# Patient Record
Sex: FEMALE | Race: BLACK OR AFRICAN AMERICAN | NOT HISPANIC OR LATINO | URBAN - METROPOLITAN AREA
[De-identification: names, ages, dates, MRNs, and addresses within clinical notes are randomized per-mention and may not be internally consistent; named-entity substitution may affect disease eponyms.]

---

## 2018-05-15 ENCOUNTER — INPATIENT (INPATIENT)
Facility: HOSPITAL | Age: 83
LOS: 1 days | Discharge: HOME HEALTH | End: 2018-05-17
Attending: HOSPITALIST | Admitting: HOSPITALIST
Payer: MEDICARE

## 2018-05-15 VITALS
RESPIRATION RATE: 18 BRPM | DIASTOLIC BLOOD PRESSURE: 99 MMHG | TEMPERATURE: 99 F | SYSTOLIC BLOOD PRESSURE: 147 MMHG | OXYGEN SATURATION: 97 % | HEART RATE: 60 BPM

## 2018-05-15 DIAGNOSIS — N39.0 URINARY TRACT INFECTION, SITE NOT SPECIFIED: ICD-10-CM

## 2018-05-15 LAB
ALBUMIN SERPL ELPH-MCNC: 3.8 G/DL — SIGNIFICANT CHANGE UP (ref 3.3–5)
ALP SERPL-CCNC: 75 U/L — SIGNIFICANT CHANGE UP (ref 40–120)
ALT FLD-CCNC: 18 U/L — SIGNIFICANT CHANGE UP (ref 4–33)
APPEARANCE UR: SIGNIFICANT CHANGE UP
AST SERPL-CCNC: 45 U/L — HIGH (ref 4–32)
BASE EXCESS BLDV CALC-SCNC: 4.2 MMOL/L — SIGNIFICANT CHANGE UP
BASOPHILS # BLD AUTO: 0.04 K/UL — SIGNIFICANT CHANGE UP (ref 0–0.2)
BASOPHILS NFR BLD AUTO: 0.4 % — SIGNIFICANT CHANGE UP (ref 0–2)
BILIRUB SERPL-MCNC: 0.6 MG/DL — SIGNIFICANT CHANGE UP (ref 0.2–1.2)
BILIRUB UR-MCNC: NEGATIVE — SIGNIFICANT CHANGE UP
BLOOD GAS VENOUS - CREATININE: 1.25 MG/DL — SIGNIFICANT CHANGE UP (ref 0.5–1.3)
BLOOD UR QL VISUAL: HIGH
BUN SERPL-MCNC: 22 MG/DL — SIGNIFICANT CHANGE UP (ref 7–23)
BUN SERPL-MCNC: 24 MG/DL — HIGH (ref 7–23)
CALCIUM SERPL-MCNC: 9.4 MG/DL — SIGNIFICANT CHANGE UP (ref 8.4–10.5)
CALCIUM SERPL-MCNC: 9.8 MG/DL — SIGNIFICANT CHANGE UP (ref 8.4–10.5)
CHLORIDE BLDV-SCNC: 100 MMOL/L — SIGNIFICANT CHANGE UP (ref 96–108)
CHLORIDE SERPL-SCNC: 93 MMOL/L — LOW (ref 98–107)
CHLORIDE SERPL-SCNC: 95 MMOL/L — LOW (ref 98–107)
CO2 SERPL-SCNC: 25 MMOL/L — SIGNIFICANT CHANGE UP (ref 22–31)
CO2 SERPL-SCNC: 26 MMOL/L — SIGNIFICANT CHANGE UP (ref 22–31)
COLOR SPEC: YELLOW — SIGNIFICANT CHANGE UP
CREAT SERPL-MCNC: 1.31 MG/DL — HIGH (ref 0.5–1.3)
CREAT SERPL-MCNC: 1.49 MG/DL — HIGH (ref 0.5–1.3)
EOSINOPHIL # BLD AUTO: 0.07 K/UL — SIGNIFICANT CHANGE UP (ref 0–0.5)
EOSINOPHIL NFR BLD AUTO: 0.7 % — SIGNIFICANT CHANGE UP (ref 0–6)
GAS PNL BLDV: 129 MMOL/L — LOW (ref 136–146)
GLUCOSE BLDV-MCNC: 221 — HIGH (ref 70–99)
GLUCOSE SERPL-MCNC: 221 MG/DL — HIGH (ref 70–99)
GLUCOSE SERPL-MCNC: 225 MG/DL — HIGH (ref 70–99)
GLUCOSE UR-MCNC: NEGATIVE — SIGNIFICANT CHANGE UP
HCO3 BLDV-SCNC: 27 MMOL/L — SIGNIFICANT CHANGE UP (ref 20–27)
HCT VFR BLD CALC: 38.4 % — SIGNIFICANT CHANGE UP (ref 34.5–45)
HCT VFR BLDV CALC: 40.4 % — SIGNIFICANT CHANGE UP (ref 34.5–45)
HGB BLD-MCNC: 12 G/DL — SIGNIFICANT CHANGE UP (ref 11.5–15.5)
HGB BLDV-MCNC: 13.1 G/DL — SIGNIFICANT CHANGE UP (ref 11.5–15.5)
IMM GRANULOCYTES # BLD AUTO: 0.02 # — SIGNIFICANT CHANGE UP
IMM GRANULOCYTES NFR BLD AUTO: 0.2 % — SIGNIFICANT CHANGE UP (ref 0–1.5)
KETONES UR-MCNC: NEGATIVE — SIGNIFICANT CHANGE UP
LACTATE BLDV-MCNC: 1.4 MMOL/L — SIGNIFICANT CHANGE UP (ref 0.5–2)
LEUKOCYTE ESTERASE UR-ACNC: HIGH
LIDOCAIN IGE QN: 30.1 U/L — SIGNIFICANT CHANGE UP (ref 7–60)
LYMPHOCYTES # BLD AUTO: 2.74 K/UL — SIGNIFICANT CHANGE UP (ref 1–3.3)
LYMPHOCYTES # BLD AUTO: 28 % — SIGNIFICANT CHANGE UP (ref 13–44)
MCHC RBC-ENTMCNC: 25.3 PG — LOW (ref 27–34)
MCHC RBC-ENTMCNC: 31.3 % — LOW (ref 32–36)
MCV RBC AUTO: 80.8 FL — SIGNIFICANT CHANGE UP (ref 80–100)
MONOCYTES # BLD AUTO: 0.49 K/UL — SIGNIFICANT CHANGE UP (ref 0–0.9)
MONOCYTES NFR BLD AUTO: 5 % — SIGNIFICANT CHANGE UP (ref 2–14)
MUCOUS THREADS # UR AUTO: SIGNIFICANT CHANGE UP
NEUTROPHILS # BLD AUTO: 6.42 K/UL — SIGNIFICANT CHANGE UP (ref 1.8–7.4)
NEUTROPHILS NFR BLD AUTO: 65.7 % — SIGNIFICANT CHANGE UP (ref 43–77)
NITRITE UR-MCNC: NEGATIVE — SIGNIFICANT CHANGE UP
NRBC # FLD: 0 — SIGNIFICANT CHANGE UP
PCO2 BLDV: 48 MMHG — SIGNIFICANT CHANGE UP (ref 41–51)
PH BLDV: 7.39 PH — SIGNIFICANT CHANGE UP (ref 7.32–7.43)
PH UR: 6 — SIGNIFICANT CHANGE UP (ref 4.6–8)
PLATELET # BLD AUTO: 230 K/UL — SIGNIFICANT CHANGE UP (ref 150–400)
PMV BLD: 10.7 FL — SIGNIFICANT CHANGE UP (ref 7–13)
PO2 BLDV: 28 MMHG — LOW (ref 35–40)
POTASSIUM BLDV-SCNC: 5.3 MMOL/L — HIGH (ref 3.4–4.5)
POTASSIUM SERPL-MCNC: 4.2 MMOL/L — SIGNIFICANT CHANGE UP (ref 3.5–5.3)
POTASSIUM SERPL-MCNC: 5.4 MMOL/L — HIGH (ref 3.5–5.3)
POTASSIUM SERPL-SCNC: 4.2 MMOL/L — SIGNIFICANT CHANGE UP (ref 3.5–5.3)
POTASSIUM SERPL-SCNC: 5.4 MMOL/L — HIGH (ref 3.5–5.3)
PROT SERPL-MCNC: 7.1 G/DL — SIGNIFICANT CHANGE UP (ref 6–8.3)
PROT UR-MCNC: 300 MG/DL — HIGH
RBC # BLD: 4.75 M/UL — SIGNIFICANT CHANGE UP (ref 3.8–5.2)
RBC # FLD: 14.6 % — HIGH (ref 10.3–14.5)
RBC CASTS # UR COMP ASSIST: SIGNIFICANT CHANGE UP (ref 0–?)
SAO2 % BLDV: 52.7 % — LOW (ref 60–85)
SODIUM SERPL-SCNC: 132 MMOL/L — LOW (ref 135–145)
SODIUM SERPL-SCNC: 132 MMOL/L — LOW (ref 135–145)
SP GR SPEC: 1.02 — SIGNIFICANT CHANGE UP (ref 1–1.04)
SQUAMOUS # UR AUTO: SIGNIFICANT CHANGE UP
UROBILINOGEN FLD QL: NORMAL MG/DL — SIGNIFICANT CHANGE UP
WBC # BLD: 9.78 K/UL — SIGNIFICANT CHANGE UP (ref 3.8–10.5)
WBC # FLD AUTO: 9.78 K/UL — SIGNIFICANT CHANGE UP (ref 3.8–10.5)
WBC UR QL: >50 — HIGH (ref 0–?)

## 2018-05-15 PROCEDURE — 99223 1ST HOSP IP/OBS HIGH 75: CPT | Mod: GC

## 2018-05-15 PROCEDURE — 71045 X-RAY EXAM CHEST 1 VIEW: CPT | Mod: 26

## 2018-05-15 RX ORDER — CEFTRIAXONE 500 MG/1
1 INJECTION, POWDER, FOR SOLUTION INTRAMUSCULAR; INTRAVENOUS ONCE
Qty: 0 | Refills: 0 | Status: COMPLETED | OUTPATIENT
Start: 2018-05-15 | End: 2018-05-15

## 2018-05-15 RX ORDER — HEPARIN SODIUM 5000 [USP'U]/ML
5000 INJECTION INTRAVENOUS; SUBCUTANEOUS EVERY 8 HOURS
Qty: 0 | Refills: 0 | Status: DISCONTINUED | OUTPATIENT
Start: 2018-05-15 | End: 2018-05-17

## 2018-05-15 RX ORDER — ACETAMINOPHEN 500 MG
975 TABLET ORAL ONCE
Qty: 0 | Refills: 0 | Status: COMPLETED | OUTPATIENT
Start: 2018-05-15 | End: 2018-05-15

## 2018-05-15 RX ADMIN — CEFTRIAXONE 100 GRAM(S): 500 INJECTION, POWDER, FOR SOLUTION INTRAMUSCULAR; INTRAVENOUS at 23:51

## 2018-05-15 RX ADMIN — Medication 975 MILLIGRAM(S): at 20:06

## 2018-05-15 NOTE — ED ADULT NURSE NOTE - CHPI ED SYMPTOMS NEG
no vomiting/no numbness/no tingling/no weakness/no chills/no decreased eating/drinking/no nausea/no fever/no dizziness

## 2018-05-15 NOTE — ED ADULT TRIAGE NOTE - CHIEF COMPLAINT QUOTE
Pt brought in by EMS from home for AMS and as per ems family told them they thought her sugar was low,  by ems. Pt complaining of worsening chronic bilateral shoulder pain. pt denies chest pain.

## 2018-05-15 NOTE — H&P ADULT - NSHPREVIEWOFSYSTEMS_GEN_ALL_CORE
REVIEW OF SYSTEMS:    CONSTITUTIONAL: No weakness, fevers or chills  EYES/ENT: No visual changes;  No vertigo or throat pain   NECK: No pain or stiffness  RESPIRATORY: No cough, wheezing, hemoptysis; No shortness of breath  CARDIOVASCULAR: No chest pain or palpitations  GASTROINTESTINAL: Loose stools. No abdominal or epigastric pain. No nausea, vomiting, or hematemesis. No melena or hematochezia.  GENITOURINARY: No dysuria, frequency or hematuria  NEUROLOGICAL: Bilateral shoulder joint pain.   SKIN: No itching, burning, rashes, or lesions   All other review of systems is negative unless indicated above.

## 2018-05-15 NOTE — H&P ADULT - PROBLEM SELECTOR PLAN 6
Continue with home medication Valsartan with hold parameters. Will hold Valsartan in setting of HIREN.

## 2018-05-15 NOTE — H&P ADULT - PROBLEM SELECTOR PLAN 5
Patient seen in PMD's office for osteoarthritis which affects her shoulders, knees, and back. Patient states she has only used it once a day and it does not give her relief.   - continue with Tylenol PRN q6H for pain  - Given HIREN and history of bleeding, would avoid NSAIDs, avoid sedating pain medication given age

## 2018-05-15 NOTE — H&P ADULT - HISTORY OF PRESENT ILLNESS
89F PMH asthma, DM, HTN, hypothyroid, HLD p/w 3 days waxing and waning episodes of AMS, most recently this morning where pt became confused and was unable to check her own blood sugar (which she typically does multiple times/day). Denies recent fever/chills, NV. +2 episodes of foul smelling diarrhea over this time. No urinary complaints. No recent travel, hospitalizations or antibiotic use. Baseline alert and oriented x 3, ambulates with a cane. Pt also c/o months of b/l shoulder pain for which she saw her PMD and is taking Tylenol. No change in pain, but notes that it does bother her. No chest pain or SOB.    In the Er, patient's vitals were Temp 98.7, HR 68, /74, RR 18, satting 98 on room air.   In the ER, patient was given Tylenol, started on Ceftriaxone. 89F PMH asthma, DM2, HTN, hypothyroid, HLD p/w 3 days waxing and waning episodes of AMS, most recently this morning where pt became confused and was unable to check her own blood sugar (which she typically does multiple times/day). No recent fever/chills, no nausea or vomiting. Per daughter at bedside patient had 3 separate episode of loose foul smelling stools in the past three days, no episodes today. Patient denies any suprapubic pain, dysuria or increased frequency/urgency. No recent travel, hospitalizations or antibiotic use. Patient is currently at her baseline mental status, which is alert and oriented x 3. Pt's main complaint at this time is bilateral shoulder pain which she was told by her PMD to take tylenol, however it has not helped.     In the Er, patient's vitals were Temp 98.7, HR 68, /74, RR 18, satting 98 on room air.   In the ER, patient was given Tylenol, started on Ceftriaxone. 89F PMH asthma, afib (due to bleeding history), DM2, HTN, HLD p/w 3 days waxing and waning episodes of AMS, most recently this morning where pt became confused and was unable to check her own blood sugar (which she typically does multiple times/day). No recent fever/chills, no cough or URI symptoms, no nausea or vomiting. Per daughter at bedside patient had 3 separate episode of loose foul smelling stools in the past three days, no episodes today. Patient denies any suprapubic pain, dysuria or increased frequency/urgency. No recent travel, hospitalizations or antibiotic use. Patient is currently at her baseline mental status, which is alert and oriented x 3. Pt's main complaint at this time is bilateral shoulder pain which she was told by her PMD to take tylenol, however she has only been taking it once a day and states it has not helped.     In the Er, patient's vitals were Temp 98.7, HR 68, /74, RR 18, satting 98 on room air.   In the ER, patient was given Tylenol, started on Ceftriaxone. 89F PMH asthma, afib not on AC (due to bleeding history), DM2, HTN, HLD p/w 3 days waxing and waning episodes of AMS, most recently this morning where pt became confused and was unable to check her own blood sugar (which she typically does multiple times/day). No recent fever/chills, no cough or URI symptoms, no nausea or vomiting. Per daughter at bedside patient had 3 separate episodes of loose foul smelling stools in the past three days, no episodes today. Patient denies any suprapubic pain, dysuria or increased frequency/urgency. No recent travel, hospitalizations or antibiotic use. Patient is currently at her baseline mental status, which is alert and oriented x 3. Pt's main complaint at this time is bilateral shoulder pain which she was told by her PMD to take tylenol, however she has only been taking it once a day and states it has not helped.     In the Er, patient's vitals were Temp 98.7, HR 68, /74, RR 18, satting 98 on room air.   In the ER, patient was given Tylenol, started on Ceftriaxone.

## 2018-05-15 NOTE — H&P ADULT - PROBLEM SELECTOR PLAN 2
Currently now resolved, mental status at baseline AOx 3. Transient change in mental status most likely can be attributed to infection 2/2 UTI.   - Unlikely to have pneumonia as pt has no clinical symptoms   - Will treat UTI as outlined above  - Continue to monitor mental status, monitor for delirium in hospital setting.

## 2018-05-15 NOTE — ED ADULT NURSE NOTE - OBJECTIVE STATEMENT
Patient's family reports that patient has been having periods of increasing confusion. I.E. Patient was trying to use lancet as glucometer. Patient's FSBG WNL. Patient denies any fever/chills, dysuria, CP or SOB. Patient had one episode of diarrhea yesterday, denies diarrhea today. Patient is currently A&O x4, able to converse without difficulty.

## 2018-05-15 NOTE — H&P ADULT - PROBLEM SELECTOR PLAN 3
Likely pre-renal secondary to decreased PO intake  - Will give gentle hydration at 75cc/hour x 10 hours, repeat BMP in the morning   - Check urine lytes Likely pre-renal secondary to decreased PO intake  - Will give gentle hydration at 75cc/hour x 10 hours, repeat BMP in the morning   - Check urine lytes  - Patient takes Valsartan 160mg at home, will hold in the setting of HIREN.

## 2018-05-15 NOTE — H&P ADULT - PROBLEM SELECTOR PLAN 8
Not in acute exacerbation. Continue with symbicort and montelukast. Pt takes Glyburide at home. Glucose level on BMP in 200's  - Will start with low dose ISS, check FS qAC and qHS  - Check A1C in morning  - Carb consistent diet.

## 2018-05-15 NOTE — ED PROVIDER NOTE - OBJECTIVE STATEMENT
89F PMH asthma, DM, HTN, hypothyroid, HLD p/w 3 days waxing and waning episodes of AMS, most recently this morning where pt became confused and was unable to check her own blood sugar (which she typically does multiple times/day). Denies recent fever/chills, NV. +2 episodes of foul smelling diarrhea over this time. No urinary complaints. No recent travel, hospitalizations or antibiotic use. Baseline alert and oriented x 3, ambulates with a cane. Pt also c/o months of b/l shoulder pain for which she saw her PMD and is taking Tylenol. No change in pain, but notes that it does bother her. No chest pain or SOB.

## 2018-05-15 NOTE — H&P ADULT - ASSESSMENT
89F PMH asthma, afib (due to bleeding history), DM2, HTN, HLD p/w 3 days waxing and waning episodes of AMS, found to have urinary tract infection.

## 2018-05-15 NOTE — H&P ADULT - NSHPPHYSICALEXAM_GEN_ALL_CORE
PHYSICAL EXAM:  GENERAL: NAD, comfortable lying in bed   HEAD:  Atraumatic, Normocephalic  CHEST/LUNG: Trace crackles bilateral bases; No rales, rhonchi, wheezing, or rubs  HEART: Regular rate and rhythm; No murmurs, rubs, or gallops  ABDOMEN: Soft, Nontender, Nondistended; Bowel sounds present  VASCULAR:  2+ Peripheral Pulses, No clubbing, cyanosis, or edema  Extremities: No pain to palpation over the shoulder joint, limited range of motion  LYMPH: No lymphadenopathy noted  SKIN: No rashes or lesions  NERVOUS SYSTEM:  Alert & Oriented X3, Good concentration

## 2018-05-15 NOTE — ED PROVIDER NOTE - ATTENDING CONTRIBUTION TO CARE
I performed a face to face bedside interview with patient regarding history of present illness, review of symptoms and past medical history. I completed an independent physical exam.  I have discussed patient's plan of care.   I agree with note as stated above, having amended the EMR as needed to reflect my findings. I have discussed the assessment and plan of care.  This includes during the time I functioned as the attending physician for this patient.  Attending Contribution to Care: agree with plan of resident. pt p/w intermittent episodes of AMS, with worsening creatine. pt stable, with +UTI. lives at home alone, with risk of worsening mental status at home. requires admission.

## 2018-05-15 NOTE — ED PROVIDER NOTE - PROGRESS NOTE DETAILS
Accepted for admission to hospitalist, MAR text page sent. Plan d/w family who are comfortable with admission.

## 2018-05-15 NOTE — H&P ADULT - PROBLEM SELECTOR PLAN 4
Pt has had 3 separate episodes of foul smelling loose stools per family, however no episodes today. No recent change in diet, hospitalizations, or use of abx.   - Continue to monitor   - If watery, can send C. Diff

## 2018-05-15 NOTE — H&P ADULT - PROBLEM SELECTOR PLAN 1
Pt p/w altered mental status found to have positive UA, will treat as uncomplicated UTI.   - Continue with ceftriaxone  - Urine culture pending

## 2018-05-15 NOTE — ED PROVIDER NOTE - MEDICAL DECISION MAKING DETAILS
89F with transient episodes of confusion, b/l shoulder pain. Eval for infectious etiology, metabolic etiology. cbc, cmp, ua, ucx, cxr, analgesia and reassess.

## 2018-05-16 DIAGNOSIS — Z29.9 ENCOUNTER FOR PROPHYLACTIC MEASURES, UNSPECIFIED: ICD-10-CM

## 2018-05-16 DIAGNOSIS — R19.5 OTHER FECAL ABNORMALITIES: ICD-10-CM

## 2018-05-16 DIAGNOSIS — E78.00 PURE HYPERCHOLESTEROLEMIA, UNSPECIFIED: ICD-10-CM

## 2018-05-16 DIAGNOSIS — M25.511 PAIN IN RIGHT SHOULDER: ICD-10-CM

## 2018-05-16 DIAGNOSIS — N39.0 URINARY TRACT INFECTION, SITE NOT SPECIFIED: ICD-10-CM

## 2018-05-16 DIAGNOSIS — N17.9 ACUTE KIDNEY FAILURE, UNSPECIFIED: ICD-10-CM

## 2018-05-16 DIAGNOSIS — R41.82 ALTERED MENTAL STATUS, UNSPECIFIED: ICD-10-CM

## 2018-05-16 DIAGNOSIS — E11.9 TYPE 2 DIABETES MELLITUS WITHOUT COMPLICATIONS: ICD-10-CM

## 2018-05-16 DIAGNOSIS — I10 ESSENTIAL (PRIMARY) HYPERTENSION: ICD-10-CM

## 2018-05-16 DIAGNOSIS — J45.909 UNSPECIFIED ASTHMA, UNCOMPLICATED: ICD-10-CM

## 2018-05-16 LAB
ALBUMIN SERPL ELPH-MCNC: 3.5 G/DL — SIGNIFICANT CHANGE UP (ref 3.3–5)
ALP SERPL-CCNC: 71 U/L — SIGNIFICANT CHANGE UP (ref 40–120)
ALT FLD-CCNC: 10 U/L — SIGNIFICANT CHANGE UP (ref 4–33)
AST SERPL-CCNC: 16 U/L — SIGNIFICANT CHANGE UP (ref 4–32)
BASOPHILS # BLD AUTO: 0.05 K/UL — SIGNIFICANT CHANGE UP (ref 0–0.2)
BASOPHILS NFR BLD AUTO: 0.6 % — SIGNIFICANT CHANGE UP (ref 0–2)
BILIRUB SERPL-MCNC: 0.5 MG/DL — SIGNIFICANT CHANGE UP (ref 0.2–1.2)
BUN SERPL-MCNC: 24 MG/DL — HIGH (ref 7–23)
CALCIUM SERPL-MCNC: 9.5 MG/DL — SIGNIFICANT CHANGE UP (ref 8.4–10.5)
CHLORIDE SERPL-SCNC: 97 MMOL/L — LOW (ref 98–107)
CO2 SERPL-SCNC: 26 MMOL/L — SIGNIFICANT CHANGE UP (ref 22–31)
CREAT SERPL-MCNC: 1.44 MG/DL — HIGH (ref 0.5–1.3)
EOSINOPHIL # BLD AUTO: 0.13 K/UL — SIGNIFICANT CHANGE UP (ref 0–0.5)
EOSINOPHIL NFR BLD AUTO: 1.4 % — SIGNIFICANT CHANGE UP (ref 0–6)
GLUCOSE BLDC GLUCOMTR-MCNC: 197 MG/DL — HIGH (ref 70–99)
GLUCOSE BLDC GLUCOMTR-MCNC: 205 MG/DL — HIGH (ref 70–99)
GLUCOSE BLDC GLUCOMTR-MCNC: 223 MG/DL — HIGH (ref 70–99)
GLUCOSE SERPL-MCNC: 174 MG/DL — HIGH (ref 70–99)
HBA1C BLD-MCNC: 9.1 % — HIGH (ref 4–5.6)
HCT VFR BLD CALC: 42.2 % — SIGNIFICANT CHANGE UP (ref 34.5–45)
HGB BLD-MCNC: 13.3 G/DL — SIGNIFICANT CHANGE UP (ref 11.5–15.5)
IMM GRANULOCYTES # BLD AUTO: 0.01 # — SIGNIFICANT CHANGE UP
IMM GRANULOCYTES NFR BLD AUTO: 0.1 % — SIGNIFICANT CHANGE UP (ref 0–1.5)
LYMPHOCYTES # BLD AUTO: 3.32 K/UL — HIGH (ref 1–3.3)
LYMPHOCYTES # BLD AUTO: 36.5 % — SIGNIFICANT CHANGE UP (ref 13–44)
MAGNESIUM SERPL-MCNC: 2.1 MG/DL — SIGNIFICANT CHANGE UP (ref 1.6–2.6)
MCHC RBC-ENTMCNC: 25.1 PG — LOW (ref 27–34)
MCHC RBC-ENTMCNC: 31.5 % — LOW (ref 32–36)
MCV RBC AUTO: 79.8 FL — LOW (ref 80–100)
MONOCYTES # BLD AUTO: 0.7 K/UL — SIGNIFICANT CHANGE UP (ref 0–0.9)
MONOCYTES NFR BLD AUTO: 7.7 % — SIGNIFICANT CHANGE UP (ref 2–14)
NEUTROPHILS # BLD AUTO: 4.88 K/UL — SIGNIFICANT CHANGE UP (ref 1.8–7.4)
NEUTROPHILS NFR BLD AUTO: 53.7 % — SIGNIFICANT CHANGE UP (ref 43–77)
NRBC # FLD: 0 — SIGNIFICANT CHANGE UP
PHOSPHATE SERPL-MCNC: 3.4 MG/DL — SIGNIFICANT CHANGE UP (ref 2.5–4.5)
PLATELET # BLD AUTO: 228 K/UL — SIGNIFICANT CHANGE UP (ref 150–400)
PMV BLD: 11.2 FL — SIGNIFICANT CHANGE UP (ref 7–13)
POTASSIUM SERPL-MCNC: 4 MMOL/L — SIGNIFICANT CHANGE UP (ref 3.5–5.3)
POTASSIUM SERPL-SCNC: 4 MMOL/L — SIGNIFICANT CHANGE UP (ref 3.5–5.3)
PROT SERPL-MCNC: 6.3 G/DL — SIGNIFICANT CHANGE UP (ref 6–8.3)
RBC # BLD: 5.29 M/UL — HIGH (ref 3.8–5.2)
RBC # FLD: 14.6 % — HIGH (ref 10.3–14.5)
SODIUM SERPL-SCNC: 136 MMOL/L — SIGNIFICANT CHANGE UP (ref 135–145)
WBC # BLD: 9.09 K/UL — SIGNIFICANT CHANGE UP (ref 3.8–10.5)
WBC # FLD AUTO: 9.09 K/UL — SIGNIFICANT CHANGE UP (ref 3.8–10.5)

## 2018-05-16 PROCEDURE — 99233 SBSQ HOSP IP/OBS HIGH 50: CPT | Mod: GC

## 2018-05-16 RX ORDER — INSULIN LISPRO 100/ML
VIAL (ML) SUBCUTANEOUS
Qty: 0 | Refills: 0 | Status: DISCONTINUED | OUTPATIENT
Start: 2018-05-16 | End: 2018-05-17

## 2018-05-16 RX ORDER — SODIUM CHLORIDE 9 MG/ML
1000 INJECTION, SOLUTION INTRAVENOUS
Qty: 0 | Refills: 0 | Status: DISCONTINUED | OUTPATIENT
Start: 2018-05-16 | End: 2018-05-17

## 2018-05-16 RX ORDER — BUDESONIDE AND FORMOTEROL FUMARATE DIHYDRATE 160; 4.5 UG/1; UG/1
2 AEROSOL RESPIRATORY (INHALATION)
Qty: 0 | Refills: 0 | Status: DISCONTINUED | OUTPATIENT
Start: 2018-05-16 | End: 2018-05-17

## 2018-05-16 RX ORDER — DEXTROSE 50 % IN WATER 50 %
12.5 SYRINGE (ML) INTRAVENOUS ONCE
Qty: 0 | Refills: 0 | Status: DISCONTINUED | OUTPATIENT
Start: 2018-05-16 | End: 2018-05-17

## 2018-05-16 RX ORDER — INSULIN LISPRO 100/ML
VIAL (ML) SUBCUTANEOUS AT BEDTIME
Qty: 0 | Refills: 0 | Status: DISCONTINUED | OUTPATIENT
Start: 2018-05-16 | End: 2018-05-17

## 2018-05-16 RX ORDER — ACETAMINOPHEN 500 MG
650 TABLET ORAL EVERY 6 HOURS
Qty: 0 | Refills: 0 | Status: DISCONTINUED | OUTPATIENT
Start: 2018-05-16 | End: 2018-05-17

## 2018-05-16 RX ORDER — MONTELUKAST 4 MG/1
10 TABLET, CHEWABLE ORAL DAILY
Qty: 0 | Refills: 0 | Status: DISCONTINUED | OUTPATIENT
Start: 2018-05-16 | End: 2018-05-17

## 2018-05-16 RX ORDER — ATORVASTATIN CALCIUM 80 MG/1
10 TABLET, FILM COATED ORAL AT BEDTIME
Qty: 0 | Refills: 0 | Status: DISCONTINUED | OUTPATIENT
Start: 2018-05-16 | End: 2018-05-17

## 2018-05-16 RX ORDER — GLUCAGON INJECTION, SOLUTION 0.5 MG/.1ML
1 INJECTION, SOLUTION SUBCUTANEOUS ONCE
Qty: 0 | Refills: 0 | Status: DISCONTINUED | OUTPATIENT
Start: 2018-05-16 | End: 2018-05-17

## 2018-05-16 RX ORDER — CEFTRIAXONE 500 MG/1
1 INJECTION, POWDER, FOR SOLUTION INTRAMUSCULAR; INTRAVENOUS EVERY 24 HOURS
Qty: 0 | Refills: 0 | Status: DISCONTINUED | OUTPATIENT
Start: 2018-05-16 | End: 2018-05-17

## 2018-05-16 RX ORDER — SODIUM CHLORIDE 9 MG/ML
1000 INJECTION INTRAMUSCULAR; INTRAVENOUS; SUBCUTANEOUS
Qty: 0 | Refills: 0 | Status: DISCONTINUED | OUTPATIENT
Start: 2018-05-16 | End: 2018-05-17

## 2018-05-16 RX ORDER — DEXTROSE 50 % IN WATER 50 %
15 SYRINGE (ML) INTRAVENOUS ONCE
Qty: 0 | Refills: 0 | Status: DISCONTINUED | OUTPATIENT
Start: 2018-05-16 | End: 2018-05-17

## 2018-05-16 RX ORDER — DEXTROSE 50 % IN WATER 50 %
25 SYRINGE (ML) INTRAVENOUS ONCE
Qty: 0 | Refills: 0 | Status: DISCONTINUED | OUTPATIENT
Start: 2018-05-16 | End: 2018-05-17

## 2018-05-16 RX ORDER — VALSARTAN 80 MG/1
160 TABLET ORAL DAILY
Qty: 0 | Refills: 0 | Status: DISCONTINUED | OUTPATIENT
Start: 2018-05-16 | End: 2018-05-16

## 2018-05-16 RX ORDER — AMLODIPINE BESYLATE 2.5 MG/1
2.5 TABLET ORAL DAILY
Qty: 0 | Refills: 0 | Status: DISCONTINUED | OUTPATIENT
Start: 2018-05-16 | End: 2018-05-17

## 2018-05-16 RX ADMIN — Medication 650 MILLIGRAM(S): at 22:46

## 2018-05-16 RX ADMIN — Medication 650 MILLIGRAM(S): at 09:50

## 2018-05-16 RX ADMIN — Medication 650 MILLIGRAM(S): at 03:45

## 2018-05-16 RX ADMIN — ATORVASTATIN CALCIUM 10 MILLIGRAM(S): 80 TABLET, FILM COATED ORAL at 22:43

## 2018-05-16 RX ADMIN — BUDESONIDE AND FORMOTEROL FUMARATE DIHYDRATE 2 PUFF(S): 160; 4.5 AEROSOL RESPIRATORY (INHALATION) at 22:41

## 2018-05-16 RX ADMIN — Medication 650 MILLIGRAM(S): at 02:34

## 2018-05-16 RX ADMIN — HEPARIN SODIUM 5000 UNIT(S): 5000 INJECTION INTRAVENOUS; SUBCUTANEOUS at 13:13

## 2018-05-16 RX ADMIN — Medication 650 MILLIGRAM(S): at 10:26

## 2018-05-16 RX ADMIN — Medication 1: at 09:01

## 2018-05-16 RX ADMIN — SODIUM CHLORIDE 75 MILLILITER(S): 9 INJECTION INTRAMUSCULAR; INTRAVENOUS; SUBCUTANEOUS at 02:04

## 2018-05-16 RX ADMIN — SODIUM CHLORIDE 75 MILLILITER(S): 9 INJECTION INTRAMUSCULAR; INTRAVENOUS; SUBCUTANEOUS at 09:00

## 2018-05-16 RX ADMIN — Medication 650 MILLIGRAM(S): at 16:41

## 2018-05-16 RX ADMIN — Medication 650 MILLIGRAM(S): at 02:04

## 2018-05-16 RX ADMIN — SODIUM CHLORIDE 75 MILLILITER(S): 9 INJECTION INTRAMUSCULAR; INTRAVENOUS; SUBCUTANEOUS at 03:44

## 2018-05-16 RX ADMIN — Medication 1: at 18:41

## 2018-05-16 RX ADMIN — MONTELUKAST 10 MILLIGRAM(S): 4 TABLET, CHEWABLE ORAL at 13:13

## 2018-05-16 RX ADMIN — HEPARIN SODIUM 5000 UNIT(S): 5000 INJECTION INTRAVENOUS; SUBCUTANEOUS at 06:43

## 2018-05-16 RX ADMIN — Medication 2: at 13:13

## 2018-05-16 RX ADMIN — AMLODIPINE BESYLATE 2.5 MILLIGRAM(S): 2.5 TABLET ORAL at 18:38

## 2018-05-16 RX ADMIN — HEPARIN SODIUM 5000 UNIT(S): 5000 INJECTION INTRAVENOUS; SUBCUTANEOUS at 22:43

## 2018-05-16 RX ADMIN — BUDESONIDE AND FORMOTEROL FUMARATE DIHYDRATE 2 PUFF(S): 160; 4.5 AEROSOL RESPIRATORY (INHALATION) at 09:00

## 2018-05-16 RX ADMIN — Medication 650 MILLIGRAM(S): at 17:30

## 2018-05-16 NOTE — PROGRESS NOTE ADULT - PROBLEM SELECTOR PLAN 4
- Pt has had 3 separate episodes of foul smelling loose stools per family, however no episodes today  - Continue to monitor bowel movements  - If watery or very foul smelling, can send C. Diff if suspicion

## 2018-05-16 NOTE — PROGRESS NOTE ADULT - PROBLEM SELECTOR PLAN 8
- Pt takes Glyburide at home. Glucose level on BMP in 200's  - Will start with low dose ISS, check FS qAC and qHS  - Check A1C in morning

## 2018-05-16 NOTE — CONSULT NOTE ADULT - SUBJECTIVE AND OBJECTIVE BOX
HISTORY OF PRESENT ILLNESS: 89 year old with HTN HLD DM h/o PAF not on AC secondary to history of bleeding , hypothyroidism with historically normal LV function on TTE admitted with AMS found to have UTI. The patient has no anginal symptoms.       PAST MEDICAL & SURGICAL HISTORY:  Atrial fibrillation  Hypothyroid  Diabetes  Hypercholesteremia  Asthma  Hypertension  No significant past surgical history    	    MEDICATIONS:  heparin  Injectable 5000 Unit(s) SubCutaneous every 8 hours  cefTRIAXone   IVPB 1 Gram(s) IV Intermittent every 24 hours  buDESOnide 160 MICROgram(s)/formoterol 4.5 MICROgram(s) Inhaler 2 Puff(s) Inhalation two times a day  montelukast 10 milliGRAM(s) Oral daily  acetaminophen   Tablet. 650 milliGRAM(s) Oral every 6 hours PRN  atorvastatin 10 milliGRAM(s) Oral at bedtime  glucagon  Injectable 1 milliGRAM(s) IntraMuscular once PRN  insulin lispro (HumaLOG) corrective regimen sliding scale   SubCutaneous three times a day before meals  insulin lispro (HumaLOG) corrective regimen sliding scale   SubCutaneous at bedtime  dextrose 5%. 1000 milliLiter(s) IV Continuous <Continuous>  sodium chloride 0.9%. 1000 milliLiter(s) IV Continuous <Continuous>      Allergies  No Known Allergies      FAMILY HISTORY:  No pertinent family history in first degree relatives      SOCIAL HISTORY:    [+ ] Non-smoker  [ ] Smoker  [- ] Alcohol      REVIEW OF SYSTEMS:  altered mental status  otherwise negative     PHYSICAL EXAM:  T(C): 37.1 (05-16-18 @ 06:23), Max: 37.7 (05-16-18 @ 01:06)  HR: 72 (05-16-18 @ 06:23) (60 - 72)  BP: 141/76 (05-16-18 @ 06:23) (141/76 - 177/74)  RR: 18 (05-16-18 @ 06:23) (18 - 18)  SpO2: 99% (05-16-18 @ 06:23) (97% - 100%)  Wt(kg): --  I&O's Summary      Appearance: Normal	  HEENT:   Normal oral mucosa, PERRL, EOMI	  Lymphatic: No lymphadenopathy  Cardiovascular: Normal S1 S2, No JVD, No murmurs, No edema  Respiratory: Lungs clear to auscultation	  Gastrointestinal:  Soft, Non-tender, + BS	  Skin: No rashes, No ecchymoses, No cyanosis	  Neurologic: Non-focal;   Extremities: Normal range of motion, No clubbing, cyanosis or edema  Vascular: Peripheral pulses palpable 2+ bilaterally    TELEMETRY: n/a  	    ECG:  	    RADIOLOGY:    Xray Chest 1 View AP/PA (05.15.18 @ 20:20) >    IMPRESSION:     Patchy bilateral lower lobe opacities may represent atelectasis and/or   pneumonia. Small left pleural effusion.        	  LABS:	 	                          13.3   9.09  )-----------( 228      ( 16 May 2018 11:05 )             42.2       05-16    136  |  97<L>  |  24<H>  ----------------------------<  174<H>  4.0   |  26  |  1.44<H>    Ca    9.5      16 May 2018 11:05  Phos  3.4     05-16  Mg     2.1     05-16    TPro  6.3  /  Alb  3.5  /  TBili  0.5  /  DBili  x   /  AST  16  /  ALT  10  /  AlkPhos  71  05-16      HgA1c: Hemoglobin A1C, Whole Blood: 9.1 % (05-16 @ 11:05)      ASSESSMENT/PLAN: 	89 year old with HTN HLD DM h/o PAF not on AC secondary to history of bleeding , hypothyroidism with historically normal LV function on TTE admitted with AMS found to have UTI. The patient has no anginal symptoms.     -- HISTORY OF PRESENT ILLNESS: 89 year old with HTN HLD DM h/o PAF not on AC secondary to history of bleeding , hypothyroidism with historically normal LV function on TTE admitted with AMS found to have UTI. The patient has no anginal symptoms.       PAST MEDICAL & SURGICAL HISTORY:  Atrial fibrillation  Hypothyroid  Diabetes  Hypercholesteremia  Asthma  Hypertension  No significant past surgical history    	    MEDICATIONS:  heparin  Injectable 5000 Unit(s) SubCutaneous every 8 hours  cefTRIAXone   IVPB 1 Gram(s) IV Intermittent every 24 hours  buDESOnide 160 MICROgram(s)/formoterol 4.5 MICROgram(s) Inhaler 2 Puff(s) Inhalation two times a day  montelukast 10 milliGRAM(s) Oral daily  acetaminophen   Tablet. 650 milliGRAM(s) Oral every 6 hours PRN  atorvastatin 10 milliGRAM(s) Oral at bedtime  glucagon  Injectable 1 milliGRAM(s) IntraMuscular once PRN  insulin lispro (HumaLOG) corrective regimen sliding scale   SubCutaneous three times a day before meals  insulin lispro (HumaLOG) corrective regimen sliding scale   SubCutaneous at bedtime  dextrose 5%. 1000 milliLiter(s) IV Continuous <Continuous>  sodium chloride 0.9%. 1000 milliLiter(s) IV Continuous <Continuous>      Allergies  No Known Allergies      FAMILY HISTORY:  No pertinent family history in first degree relatives      SOCIAL HISTORY:    [+ ] Non-smoker  [ ] Smoker  [- ] Alcohol      REVIEW OF SYSTEMS:  altered mental status  otherwise negative     PHYSICAL EXAM:  T(C): 37.1 (05-16-18 @ 06:23), Max: 37.7 (05-16-18 @ 01:06)  HR: 72 (05-16-18 @ 06:23) (60 - 72)  BP: 141/76 (05-16-18 @ 06:23) (141/76 - 177/74)  RR: 18 (05-16-18 @ 06:23) (18 - 18)  SpO2: 99% (05-16-18 @ 06:23) (97% - 100%)  Wt(kg): --  I&O's Summary      Appearance: Normal	  HEENT:   Normal oral mucosa, PERRL, EOMI	  Lymphatic: No lymphadenopathy  Cardiovascular: Normal S1 S2, No JVD, No murmurs, No edema  Respiratory: Lungs clear to auscultation	  Gastrointestinal:  Soft, Non-tender, + BS	  Skin: No rashes, No ecchymoses, No cyanosis	  Neurologic: Non-focal;   Extremities: , No clubbing, cyanosis or edema  Vascular: Peripheral pulses palpable 2+ bilaterally    TELEMETRY: n/a  	    ECG:  	nsr narrow qrs no acute ischemia    RADIOLOGY:    Xray Chest 1 View AP/PA (05.15.18 @ 20:20) >    IMPRESSION:     Patchy bilateral lower lobe opacities may represent atelectasis and/or   pneumonia. Small left pleural effusion.        	  LABS:	 	                          13.3   9.09  )-----------( 228      ( 16 May 2018 11:05 )             42.2       05-16    136  |  97<L>  |  24<H>  ----------------------------<  174<H>  4.0   |  26  |  1.44<H>    Ca    9.5      16 May 2018 11:05  Phos  3.4     05-16  Mg     2.1     05-16    TPro  6.3  /  Alb  3.5  /  TBili  0.5  /  DBili  x   /  AST  16  /  ALT  10  /  AlkPhos  71  05-16      HgA1c: Hemoglobin A1C, Whole Blood: 9.1 % (05-16 @ 11:05)      ASSESSMENT/PLAN: 	89 year old with HTN HLD DM h/o PAF not on AC secondary to history of bleeding , hypothyroidism with historically normal LV function on TTE admitted with AMS found to have UTI. The patient has no anginal symptoms.     -- ekg with no acute ischemia ; NSR  -- mental status improving   -- IV abx for UTI per primary team   -- h/o PAF - no AC secondary to advanced age and bleeding history  -- will arrange outpt f/u with Dr Clark upon dc for cardiology   -- at this time no need for inpt cardiac testing   -- HD stable no evidence CHF    Fanny Chung RPA-C

## 2018-05-16 NOTE — CONSULT NOTE ADULT - ATTENDING COMMENTS
Patient seen and examined.  Agree with above.   -off ac secondary to bleeding risk  -continue with rate control strategy for afib    Daniel Yang MD

## 2018-05-16 NOTE — PROGRESS NOTE ADULT - PROBLEM SELECTOR PLAN 1
- patient with positive UA and admitted with AMS- may have AMS 2/2 to UTI  - Continue with ceftriaxone for uncomplicated UTI (likely 3 days of abx)  - Urine culture pending

## 2018-05-16 NOTE — PROGRESS NOTE ADULT - ASSESSMENT
89F PMH asthma, afib (due to bleeding history), DM2, HTN, HLD p/w 3 days waxing and waning episodes of AMS, found to have urinary tract infection, now back to baseline.

## 2018-05-16 NOTE — PROGRESS NOTE ADULT - PROBLEM SELECTOR PLAN 2
- this morning- patient AxOx3- appropriately answers most questions  - overnight, per daughter, patient back to baseline   - Will treat UTI as outlined above  - Continue to monitor mental status, monitor for delirium in hospital setting

## 2018-05-16 NOTE — PROGRESS NOTE ADULT - PROBLEM SELECTOR PLAN 3
Likely pre-renal secondary to decreased PO intake  - Will give gentle hydration at 75cc/hour x 10 hours, repeat BMP in the morning   - Check urine lytes  - Patient takes Valsartan 160mg at home, will hold in the setting of HIREN. - Likely pre-renal secondary to decreased PO intake and diarrhea  - s/p fluid resuscitation- Cr improved  - Check urine lytes  - Patient takes Valsartan 160mg at home, will hold in the setting of HIREN  - per son (HCP) patient recently taken of HCTZ by outpatient physician

## 2018-05-16 NOTE — PROGRESS NOTE ADULT - PROBLEM SELECTOR PLAN 6
-Will hold Valsartan in setting of HIREN  - patient on felodipine at home -Will hold Valsartan in setting of HIREN  - patient on felodipine at home, c/w amlodipine here

## 2018-05-16 NOTE — PROGRESS NOTE ADULT - SUBJECTIVE AND OBJECTIVE BOX
Kelly Tiffani PGY 1  Pager: [phone]/ [phone]    Patient is a 89y old  Female who presents with a chief complaint of Altered mental status (16 May 2018 03:58)      SUBJECTIVE / OVERNIGHT EVENTS:  Patient seen and examined at bedside.    Other Review of Systems Negative.    MEDICATIONS  (STANDING):  atorvastatin 10 milliGRAM(s) Oral at bedtime  buDESOnide 160 MICROgram(s)/formoterol 4.5 MICROgram(s) Inhaler 2 Puff(s) Inhalation two times a day  cefTRIAXone   IVPB 1 Gram(s) IV Intermittent every 24 hours  dextrose 5%. 1000 milliLiter(s) (50 mL/Hr) IV Continuous <Continuous>  dextrose 50% Injectable 12.5 Gram(s) IV Push once  dextrose 50% Injectable 25 Gram(s) IV Push once  dextrose 50% Injectable 25 Gram(s) IV Push once  heparin  Injectable 5000 Unit(s) SubCutaneous every 8 hours  insulin lispro (HumaLOG) corrective regimen sliding scale   SubCutaneous three times a day before meals  insulin lispro (HumaLOG) corrective regimen sliding scale   SubCutaneous at bedtime  montelukast 10 milliGRAM(s) Oral daily  sodium chloride 0.9%. 1000 milliLiter(s) (75 mL/Hr) IV Continuous <Continuous>    MEDICATIONS  (PRN):  acetaminophen   Tablet. 650 milliGRAM(s) Oral every 6 hours PRN Mild Pain (1 - 3)  dextrose 40% Gel 15 Gram(s) Oral once PRN Blood Glucose LESS THAN 70 milliGRAM(s)/deciliter  glucagon  Injectable 1 milliGRAM(s) IntraMuscular once PRN Glucose LESS THAN 70 milligrams/deciliter      OBJECTIVE:    Vital Signs Last 24 Hrs  T(C): 37.1 (16 May 2018 06:23), Max: 37.7 (16 May 2018 01:06)  T(F): 98.7 (16 May 2018 06:23), Max: 99.9 (16 May 2018 01:06)  HR: 72 (16 May 2018 06:23) (60 - 72)  BP: 141/76 (16 May 2018 06:23) (141/76 - 177/74)  BP(mean): --  RR: 18 (16 May 2018 06:23) (18 - 18)  SpO2: 99% (16 May 2018 06:23) (97% - 100%)    CAPILLARY BLOOD GLUCOSE        I&O's Summary      PHYSICAL EXAM:  GENERAL: NAD, well-developed  HEAD:  Atraumatic, Normocephalic  EYES: EOMI, PERRLA, conjunctiva and sclera clear  NECK: Supple, No JVD  CHEST/LUNG: Clear to auscultation bilaterally; No wheeze  HEART: Regular rate and rhythm; No murmurs, rubs, or gallops  ABDOMEN: Soft, Nontender, Nondistended; Bowel sounds present  EXTREMITIES:  2+ Peripheral Pulses, No clubbing, cyanosis, or edema  PSYCH: AAOx3  NEUROLOGY: non-focal  SKIN: No rashes or lesions    LABS:                        12.0   9.78  )-----------( 230      ( 15 May 2018 19:17 )             38.4     Auto Eosinophil # 0.07  / Auto Eosinophil % 0.7   / Auto Neutrophil # 6.42  / Auto Neutrophil % 65.7  / BANDS % x        05-15    132<L>  |  95<L>  |  24<H>  ----------------------------<  221<H>  4.2   |  26  |  1.49<H>  05-15    132<L>  |  93<L>  |  22  ----------------------------<  225<H>  5.4<H>   |  25  |  1.31<H>    Ca    9.4      15 May 2018 22:13  TPro  7.1  /  Alb  3.8  /  TBili  0.6  /  DBili  x   /  AST  45<H>  /  ALT  18  /  AlkPhos  75  05-15          Urinalysis Basic - ( 15 May 2018 22:00 )    Color: YELLOW / Appearance: HAZY / S.017 / pH: 6.0  Gluc: NEGATIVE / Ketone: NEGATIVE  / Bili: NEGATIVE / Urobili: NORMAL mg/dL   Blood: TRACE / Protein: 300 mg/dL / Nitrite: NEGATIVE   Leuk Esterase: LARGE / RBC: 2-5 / WBC >50   Sq Epi: OCC / Non Sq Epi: x / Bacteria: x Kelly Nixon PGY 1  Pager: [phone]/ [phone]    Patient is a 89y old  Female who presents with a chief complaint of Altered mental status (16 May 2018 03:58)      SUBJECTIVE / OVERNIGHT EVENTS:  Patient seen and examined at bedside. Patient is complaining of pain in the left shoulder and her knees, otherwise has no complaints.    Other Review of Systems Negative.    MEDICATIONS  (STANDING):  atorvastatin 10 milliGRAM(s) Oral at bedtime  buDESOnide 160 MICROgram(s)/formoterol 4.5 MICROgram(s) Inhaler 2 Puff(s) Inhalation two times a day  cefTRIAXone   IVPB 1 Gram(s) IV Intermittent every 24 hours  dextrose 5%. 1000 milliLiter(s) (50 mL/Hr) IV Continuous <Continuous>  dextrose 50% Injectable 12.5 Gram(s) IV Push once  dextrose 50% Injectable 25 Gram(s) IV Push once  dextrose 50% Injectable 25 Gram(s) IV Push once  heparin  Injectable 5000 Unit(s) SubCutaneous every 8 hours  insulin lispro (HumaLOG) corrective regimen sliding scale   SubCutaneous three times a day before meals  insulin lispro (HumaLOG) corrective regimen sliding scale   SubCutaneous at bedtime  montelukast 10 milliGRAM(s) Oral daily  sodium chloride 0.9%. 1000 milliLiter(s) (75 mL/Hr) IV Continuous <Continuous>    MEDICATIONS  (PRN):  acetaminophen   Tablet. 650 milliGRAM(s) Oral every 6 hours PRN Mild Pain (1 - 3)  dextrose 40% Gel 15 Gram(s) Oral once PRN Blood Glucose LESS THAN 70 milliGRAM(s)/deciliter  glucagon  Injectable 1 milliGRAM(s) IntraMuscular once PRN Glucose LESS THAN 70 milligrams/deciliter      OBJECTIVE:    Vital Signs Last 24 Hrs  T(C): 37.1 (16 May 2018 06:23), Max: 37.7 (16 May 2018 01:06)  T(F): 98.7 (16 May 2018 06:23), Max: 99.9 (16 May 2018 01:06)  HR: 72 (16 May 2018 06:23) (60 - 72)  BP: 141/76 (16 May 2018 06:23) (141/76 - 177/74)  BP(mean): --  RR: 18 (16 May 2018 06:23) (18 - 18)  SpO2: 99% (16 May 2018 06:23) (97% - 100%)    CAPILLARY BLOOD GLUCOSE        I&O's Summary      PHYSICAL EXAM:  GENERAL: NAD, well-developed  HEAD:  Atraumatic, Normocephalic  EYES: EOMI, PERRLA, conjunctiva and sclera clear  NECK: Supple, No JVD  CHEST/LUNG: Clear to auscultation bilaterally; No wheeze  HEART: Regular rate and rhythm; No murmurs, rubs, or gallops  ABDOMEN: Soft, Nontender, Nondistended; Bowel sounds present  EXTREMITIES:  2+ Peripheral Pulses, No clubbing, cyanosis, or edema  PSYCH: AAOx3  NEUROLOGY: non-focal  SKIN: No rashes or lesions    LABS:                        12.0   9.78  )-----------( 230      ( 15 May 2018 19:17 )             38.4     Auto Eosinophil # 0.07  / Auto Eosinophil % 0.7   / Auto Neutrophil # 6.42  / Auto Neutrophil % 65.7  / BANDS % x        05-15    132<L>  |  95<L>  |  24<H>  ----------------------------<  221<H>  4.2   |  26  |  1.49<H>  05-15    132<L>  |  93<L>  |  22  ----------------------------<  225<H>  5.4<H>   |  25  |  1.31<H>    Ca    9.4      15 May 2018 22:13  TPro  7.1  /  Alb  3.8  /  TBili  0.6  /  DBili  x   /  AST  45<H>  /  ALT  18  /  AlkPhos  75  05-15          Urinalysis Basic - ( 15 May 2018 22:00 )    Color: YELLOW / Appearance: HAZY / S.017 / pH: 6.0  Gluc: NEGATIVE / Ketone: NEGATIVE  / Bili: NEGATIVE / Urobili: NORMAL mg/dL   Blood: TRACE / Protein: 300 mg/dL / Nitrite: NEGATIVE   Leuk Esterase: LARGE / RBC: 2-5 / WBC >50   Sq Epi: OCC / Non Sq Epi: x / Bacteria: x

## 2018-05-16 NOTE — PROGRESS NOTE ADULT - PROBLEM SELECTOR PLAN 10
HSQ  CC diet  PT eval - HSQ  - CC diet  - PT eval- recommend rehab, however patient and HCP opt for home PT  - FULL CODE

## 2018-05-17 ENCOUNTER — TRANSCRIPTION ENCOUNTER (OUTPATIENT)
Age: 83
End: 2018-05-17

## 2018-05-17 VITALS — HEART RATE: 65 BPM | TEMPERATURE: 98 F | OXYGEN SATURATION: 100 % | RESPIRATION RATE: 18 BRPM

## 2018-05-17 LAB
BACTERIA UR CULT: SIGNIFICANT CHANGE UP
BUN SERPL-MCNC: 22 MG/DL — SIGNIFICANT CHANGE UP (ref 7–23)
CALCIUM SERPL-MCNC: 9.3 MG/DL — SIGNIFICANT CHANGE UP (ref 8.4–10.5)
CHLORIDE SERPL-SCNC: 100 MMOL/L — SIGNIFICANT CHANGE UP (ref 98–107)
CO2 SERPL-SCNC: 24 MMOL/L — SIGNIFICANT CHANGE UP (ref 22–31)
CREAT SERPL-MCNC: 1.5 MG/DL — HIGH (ref 0.5–1.3)
GLUCOSE BLDC GLUCOMTR-MCNC: 203 MG/DL — HIGH (ref 70–99)
GLUCOSE BLDC GLUCOMTR-MCNC: 224 MG/DL — HIGH (ref 70–99)
GLUCOSE SERPL-MCNC: 230 MG/DL — HIGH (ref 70–99)
POTASSIUM SERPL-MCNC: 3.9 MMOL/L — SIGNIFICANT CHANGE UP (ref 3.5–5.3)
POTASSIUM SERPL-SCNC: 3.9 MMOL/L — SIGNIFICANT CHANGE UP (ref 3.5–5.3)
SODIUM SERPL-SCNC: 133 MMOL/L — LOW (ref 135–145)
SPECIMEN SOURCE: SIGNIFICANT CHANGE UP

## 2018-05-17 PROCEDURE — 99239 HOSP IP/OBS DSCHRG MGMT >30: CPT

## 2018-05-17 RX ORDER — CEPHALEXIN 500 MG
1 CAPSULE ORAL
Qty: 6 | Refills: 0 | OUTPATIENT
Start: 2018-05-17 | End: 2018-05-19

## 2018-05-17 RX ORDER — TRAMADOL HYDROCHLORIDE 50 MG/1
1 TABLET ORAL
Qty: 15 | Refills: 0 | OUTPATIENT
Start: 2018-05-17 | End: 2018-05-21

## 2018-05-17 RX ORDER — TRAMADOL HYDROCHLORIDE 50 MG/1
50 TABLET ORAL ONCE
Qty: 0 | Refills: 0 | Status: DISCONTINUED | OUTPATIENT
Start: 2018-05-17 | End: 2018-05-17

## 2018-05-17 RX ORDER — LIDOCAINE 4 G/100G
3 CREAM TOPICAL DAILY
Qty: 0 | Refills: 0 | Status: DISCONTINUED | OUTPATIENT
Start: 2018-05-17 | End: 2018-05-17

## 2018-05-17 RX ORDER — VALSARTAN 80 MG/1
1 TABLET ORAL
Qty: 0 | Refills: 0 | COMMUNITY

## 2018-05-17 RX ADMIN — Medication 650 MILLIGRAM(S): at 13:47

## 2018-05-17 RX ADMIN — Medication 650 MILLIGRAM(S): at 07:06

## 2018-05-17 RX ADMIN — AMLODIPINE BESYLATE 2.5 MILLIGRAM(S): 2.5 TABLET ORAL at 06:49

## 2018-05-17 RX ADMIN — LIDOCAINE 3 PATCH: 4 CREAM TOPICAL at 15:30

## 2018-05-17 RX ADMIN — TRAMADOL HYDROCHLORIDE 50 MILLIGRAM(S): 50 TABLET ORAL at 16:20

## 2018-05-17 RX ADMIN — Medication 650 MILLIGRAM(S): at 14:25

## 2018-05-17 RX ADMIN — HEPARIN SODIUM 5000 UNIT(S): 5000 INJECTION INTRAVENOUS; SUBCUTANEOUS at 13:19

## 2018-05-17 RX ADMIN — Medication 2: at 09:20

## 2018-05-17 RX ADMIN — CEFTRIAXONE 100 GRAM(S): 500 INJECTION, POWDER, FOR SOLUTION INTRAMUSCULAR; INTRAVENOUS at 00:16

## 2018-05-17 RX ADMIN — MONTELUKAST 10 MILLIGRAM(S): 4 TABLET, CHEWABLE ORAL at 13:19

## 2018-05-17 RX ADMIN — HEPARIN SODIUM 5000 UNIT(S): 5000 INJECTION INTRAVENOUS; SUBCUTANEOUS at 06:49

## 2018-05-17 RX ADMIN — Medication 650 MILLIGRAM(S): at 08:00

## 2018-05-17 RX ADMIN — Medication 2: at 13:20

## 2018-05-17 RX ADMIN — BUDESONIDE AND FORMOTEROL FUMARATE DIHYDRATE 2 PUFF(S): 160; 4.5 AEROSOL RESPIRATORY (INHALATION) at 09:20

## 2018-05-17 RX ADMIN — TRAMADOL HYDROCHLORIDE 50 MILLIGRAM(S): 50 TABLET ORAL at 17:15

## 2018-05-17 NOTE — PROGRESS NOTE ADULT - PROBLEM SELECTOR PLAN 5
- Patient seen in PMD's office for osteoarthritis which affects her shoulders, knees, and back.  - patient states she does not get relief from tylenol, but only uses it  once a day    - continue with Tylenol PRN q6H for pain  - Given HIREN and history of bleeding, would avoid NSAIDs, avoid sedating pain medication given age

## 2018-05-17 NOTE — PROGRESS NOTE ADULT - SUBJECTIVE AND OBJECTIVE BOX
Kelly Nixon PGY 1  Pager: [phone]/ [phone]  Patient is a 89y old  Female who presents with a chief complaint of Altered mental status (16 May 2018 03:58)      SUBJECTIVE / OVERNIGHT EVENTS:  Patient seen and examined at bedside. Patient feels well this morning. She is very conversant and pleasant. AxOx3.     Other Review of Systems Negative.    MEDICATIONS  (STANDING):  amLODIPine   Tablet 2.5 milliGRAM(s) Oral daily  atorvastatin 10 milliGRAM(s) Oral at bedtime  buDESOnide 160 MICROgram(s)/formoterol 4.5 MICROgram(s) Inhaler 2 Puff(s) Inhalation two times a day  cefTRIAXone   IVPB 1 Gram(s) IV Intermittent every 24 hours  dextrose 5%. 1000 milliLiter(s) (50 mL/Hr) IV Continuous <Continuous>  dextrose 50% Injectable 12.5 Gram(s) IV Push once  dextrose 50% Injectable 25 Gram(s) IV Push once  dextrose 50% Injectable 25 Gram(s) IV Push once  heparin  Injectable 5000 Unit(s) SubCutaneous every 8 hours  insulin lispro (HumaLOG) corrective regimen sliding scale   SubCutaneous three times a day before meals  insulin lispro (HumaLOG) corrective regimen sliding scale   SubCutaneous at bedtime  montelukast 10 milliGRAM(s) Oral daily  sodium chloride 0.9%. 1000 milliLiter(s) (75 mL/Hr) IV Continuous <Continuous>    MEDICATIONS  (PRN):  acetaminophen   Tablet. 650 milliGRAM(s) Oral every 6 hours PRN Mild Pain (1 - 3)  dextrose 40% Gel 15 Gram(s) Oral once PRN Blood Glucose LESS THAN 70 milliGRAM(s)/deciliter  glucagon  Injectable 1 milliGRAM(s) IntraMuscular once PRN Glucose LESS THAN 70 milligrams/deciliter      OBJECTIVE:    Vital Signs Last 24 Hrs  T(C): 36.4 (17 May 2018 06:46), Max: 36.7 (16 May 2018 21:43)  T(F): 97.5 (17 May 2018 06:46), Max: 98 (16 May 2018 21:43)  HR: 63 (17 May 2018 06:46) (63 - 80)  BP: 176/96 (17 May 2018 06:46) (141/88 - 176/96)  BP(mean): --  RR: 18 (17 May 2018 06:46) (18 - 18)  SpO2: 100% (17 May 2018 06:46) (96% - 100%)    CAPILLARY BLOOD GLUCOSE      POCT Blood Glucose.: 224 mg/dL (17 May 2018 08:52)  POCT Blood Glucose.: 223 mg/dL (16 May 2018 22:29)  POCT Blood Glucose.: 197 mg/dL (16 May 2018 17:57)  POCT Blood Glucose.: 205 mg/dL (16 May 2018 12:23)    I&O's Summary      PHYSICAL EXAM:  GENERAL: NAD, well-developed  HEAD:  Atraumatic, Normocephalic  EYES: EOMI, PERRLA, conjunctiva and sclera clear  NECK: Supple, No JVD  CHEST/LUNG: Clear to auscultation bilaterally; No wheeze  HEART: Regular rate and rhythm; No murmurs, rubs, or gallops  ABDOMEN: Soft, Nontender, Nondistended; Bowel sounds present  EXTREMITIES:  2+ Peripheral Pulses, No clubbing, cyanosis, or edema  PSYCH: AAOx3  NEUROLOGY: non-focal  SKIN: No rashes or lesions    LABS:                        13.3   9.09  )-----------( 228      ( 16 May 2018 11:05 )             42.2     Auto Eosinophil # 0.13  / Auto Eosinophil % 1.4   / Auto Neutrophil # 4.88  / Auto Neutrophil % 53.7  / BANDS % x                            12.0   9.78  )-----------( 230      ( 15 May 2018 19:17 )             38.4     Auto Eosinophil # 0.07  / Auto Eosinophil % 0.7   / Auto Neutrophil # 6.42  / Auto Neutrophil % 65.7  / BANDS % x        05-17    133<L>  |  100  |  22  ----------------------------<  230<H>  3.9   |  24  |  1.50<H>  05-16    136  |  97<L>  |  24<H>  ----------------------------<  174<H>  4.0   |  26  |  1.44<H>  05-15    132<L>  |  95<L>  |  24<H>  ----------------------------<  221<H>  4.2   |  26  |  1.49<H>    Ca    9.3      17 May 2018 07:49  Mg     2.1     -  Phos  3.4     -  TPro  6.3  /  Alb  3.5  /  TBili  0.5  /  DBili  x   /  AST  16  /  ALT  10  /  AlkPhos  71    TPro  7.1  /  Alb  3.8  /  TBili  0.6  /  DBili  x   /  AST  45<H>  /  ALT  18  /  AlkPhos  75  0515          Urinalysis Basic - ( 15 May 2018 22:00 )    Color: YELLOW / Appearance: HAZY / S.017 / pH: 6.0  Gluc: NEGATIVE / Ketone: NEGATIVE  / Bili: NEGATIVE / Urobili: NORMAL mg/dL   Blood: TRACE / Protein: 300 mg/dL / Nitrite: NEGATIVE   Leuk Esterase: LARGE / RBC: 2-5 / WBC >50   Sq Epi: OCC / Non Sq Epi: x / Bacteria: x

## 2018-05-17 NOTE — DISCHARGE NOTE ADULT - ADDITIONAL INSTRUCTIONS
1. Please complete your antibiotics as prescribed.  2. Please follow up with your primary care doctor within 1 week of discharge.

## 2018-05-17 NOTE — DISCHARGE NOTE ADULT - PLAN OF CARE
Antibiotic Administration You were admitted to the hospital because you had a urinary tract infection which caused you to become confused. Please take your antibiotics for the next 3 days as prescribed to complete the treatment. Ongoing care Please continue to take your glyburide. Medication adjusment Please continue to take your felodipine, but do not continue your valsartan as you have some kidney injury. See your primary care doctor within 1 week of discharge to get your kidney function tested. Please follow up with your primary care doctor to follow up your kidney function. pain management You have chronic pain on your shoulder and your back from osteoarthritis. Please take the tramadol as needed and use the lidocaine patches for pain. Follow up with your PCP within 1 week of discharge for further management.

## 2018-05-17 NOTE — PROGRESS NOTE ADULT - SUBJECTIVE AND OBJECTIVE BOX
Subjective:   	 no chest pain   no shortness of breath       MEDICATIONS:  MEDICATIONS  (STANDING):  amLODIPine   Tablet 2.5 milliGRAM(s) Oral daily  atorvastatin 10 milliGRAM(s) Oral at bedtime  buDESOnide 160 MICROgram(s)/formoterol 4.5 MICROgram(s) Inhaler 2 Puff(s) Inhalation two times a day  cefTRIAXone   IVPB 1 Gram(s) IV Intermittent every 24 hours  dextrose 5%. 1000 milliLiter(s) (50 mL/Hr) IV Continuous <Continuous>  dextrose 50% Injectable 12.5 Gram(s) IV Push once  dextrose 50% Injectable 25 Gram(s) IV Push once  dextrose 50% Injectable 25 Gram(s) IV Push once  heparin  Injectable 5000 Unit(s) SubCutaneous every 8 hours  insulin lispro (HumaLOG) corrective regimen sliding scale   SubCutaneous three times a day before meals  insulin lispro (HumaLOG) corrective regimen sliding scale   SubCutaneous at bedtime  montelukast 10 milliGRAM(s) Oral daily  sodium chloride 0.9%. 1000 milliLiter(s) (75 mL/Hr) IV Continuous <Continuous>    MEDICATIONS  (PRN):  acetaminophen   Tablet. 650 milliGRAM(s) Oral every 6 hours PRN Mild Pain (1 - 3)  dextrose 40% Gel 15 Gram(s) Oral once PRN Blood Glucose LESS THAN 70 milliGRAM(s)/deciliter  glucagon  Injectable 1 milliGRAM(s) IntraMuscular once PRN Glucose LESS THAN 70 milligrams/deciliter      LABS:	 	    CARDIAC MARKERS:                                13.3   9.09  )-----------( 228      ( 16 May 2018 11:05 )             42.2     05-17    133<L>  |  100  |  22  ----------------------------<  230<H>  3.9   |  24  |  1.50<H>    Ca    9.3      17 May 2018 07:49  Phos  3.4     05-16  Mg     2.1     05-16    TPro  6.3  /  Alb  3.5  /  TBili  0.5  /  DBili  x   /  AST  16  /  ALT  10  /  AlkPhos  71  05-16    COAGS:       proBNP:   Lipid Profile:   HgA1c:   TSH:       PHYSICAL EXAM:  T(C): 36.4 (05-17-18 @ 06:46), Max: 36.7 (05-16-18 @ 21:43)  HR: 63 (05-17-18 @ 06:46) (63 - 80)  BP: 176/96 (05-17-18 @ 06:46) (141/88 - 176/96)  RR: 18 (05-17-18 @ 06:46) (18 - 18)  SpO2: 100% (05-17-18 @ 06:46) (96% - 100%)  Wt(kg): --  I&O's Summary        	    Cardiovascular: Normal S1 S2,     No JVD, 1/6 SABA murmur, Peripheral pulses palpable 2+ bilaterally  Respiratory: Lungs clear to auscultation, normal effort 	  Gastrointestinal:  Soft, Non-tender, + BS	  Extremities no edema, cyanosis, clubbing B/L LE's       TELEMETRY: 	    ECG: nsr narrow qrs no acute ischemia 	  RADIOLOGY:    Xray Chest 1 View AP/PA (05.15.18 @ 20:20) >    IMPRESSION:     Patchy bilateral lower lobe opacities may represent atelectasis and/or   pneumonia. Small left pleural effusion.    DIAGNOSTIC TESTING:  [ ] Echocardiogram:  [ ]  Catheterization:  [ ] Stress Test:    OTHER: 	      ASSESSMENT/PLAN: 	89y Female with HTN HLD DM h/o PAF not on AC secondary to history of bleeding , hypothyroidism with historically normal LV function on TTE admitted with AMS found to have UTI. The patient has no anginal symptoms.     -- ekg with no acute ischemia ; NSR  -- mental status improving   -- IV abx for UTI per primary team   -- h/o PAF - no AC secondary to advanced age and bleeding history          -continue with rate control strategy for afib  -- will arrange outpt f/u with Dr Clark upon dc for cardiology   -- at this time no need for inpt cardiac testing   -- HD stable no evidence CHF

## 2018-05-17 NOTE — DISCHARGE NOTE ADULT - HOSPITAL COURSE
89F PMH asthma, afib not on AC (due to bleeding history), DM2, HTN, HLD p/w 3 days waxing and waning episodes of AMS, most recently this morning where pt became confused and was unable to check her own blood sugar (which she typically does multiple times/day). No recent fever/chills, no cough or URI symptoms, no nausea or vomiting. Per daughter at bedside patient had 3 separate episodes of loose foul smelling stools in the past three days, no episodes today. Patient denies any suprapubic pain, dysuria or increased frequency/urgency. No recent travel, hospitalizations or antibiotic use. Patient is currently at her baseline mental status, which is alert and oriented x 3. Pt's main complaint at this time is bilateral shoulder pain which she was told by her PMD to take tylenol, however she has only been taking it once a day and states it has not helped.   In the Er, patient's vitals were Temp 98.7, HR 68, /74, RR 18, satting 98 on room air. In the ER, patient was given Tylenol, started on Ceftriaxone.    Patient admitted to the hospital for evaluation of her altered mental status. She was found to have a positive UA, and was thought to have AMS secondary to a UTI.  She was started on ceftriaxone for empiric coverage, and monitored in the hospital. She remained afebrile, without leukocytosis, however did have an HIREN on CKD. She was given some fluid resuscitation as her HIREN was though to be pre-renal in the setting of decreased PO intake and diarrhea. Her Cr remained stable at 1.5, and patient will need to follow up with her PCP within 1 week of discharge to ensure it comes back to baseline. Given her HIREN, patient's valsartan was held. She was continued on her felodipine for Bp control, and her BPs remained in the systolic 140s-150s.    Patient has clinically improved and is mentating at her baseline per her family members She will complete an additional 3 day course of antibiotics for uncomplicated UTI and follow up with her PCP within 1 week of discharge.

## 2018-05-17 NOTE — PROGRESS NOTE ADULT - PROBLEM SELECTOR PLAN 3
- Likely pre-renal secondary to decreased PO intake and diarrhea  - s/p fluid resuscitation  - Patient takes Valsartan 160mg at home, will hold in the setting of HIREN  - per son (HCP) patient recently taken off HCTZ by outpatient physician

## 2018-05-17 NOTE — DISCHARGE NOTE ADULT - CARE PLAN
Principal Discharge DX:	UTI (urinary tract infection)  Goal:	Antibiotic Administration  Assessment and plan of treatment:	You were admitted to the hospital because you had a urinary tract infection which caused you to become confused. Please take your antibiotics for the next 3 days as prescribed to complete the treatment.  Secondary Diagnosis:	Diabetes mellitus type 2, noninsulin dependent  Goal:	Ongoing care  Assessment and plan of treatment:	Please continue to take your glyburide.  Secondary Diagnosis:	Hypertension  Goal:	Medication adjusment  Assessment and plan of treatment:	Please continue to take your felodipine, but do not continue your valsartan as you have some kidney injury. See your primary care doctor within 1 week of discharge to get your kidney function tested.  Secondary Diagnosis:	HIREN (acute kidney injury)  Goal:	Ongoing care  Assessment and plan of treatment:	Please follow up with your primary care doctor to follow up your kidney function. Principal Discharge DX:	UTI (urinary tract infection)  Goal:	Antibiotic Administration  Assessment and plan of treatment:	You were admitted to the hospital because you had a urinary tract infection which caused you to become confused. Please take your antibiotics for the next 3 days as prescribed to complete the treatment.  Secondary Diagnosis:	Diabetes mellitus type 2, noninsulin dependent  Goal:	Ongoing care  Assessment and plan of treatment:	Please continue to take your glyburide.  Secondary Diagnosis:	Hypertension  Goal:	Medication adjusment  Assessment and plan of treatment:	Please continue to take your felodipine, but do not continue your valsartan as you have some kidney injury. See your primary care doctor within 1 week of discharge to get your kidney function tested.  Secondary Diagnosis:	HIREN (acute kidney injury)  Goal:	Ongoing care  Assessment and plan of treatment:	Please follow up with your primary care doctor to follow up your kidney function.  Secondary Diagnosis:	Pain  Goal:	pain management  Assessment and plan of treatment:	You have chronic pain on your shoulder and your back from osteoarthritis. Please take the tramadol as needed and use the lidocaine patches for pain. Follow up with your PCP within 1 week of discharge for further management.

## 2018-05-17 NOTE — CHART NOTE - NSCHARTNOTEFT_GEN_A_CORE
Notified by RN that Patient is complaining of pain. Patient with good pain control during the day, however with known b/l shoulder pain that has caused chronic pain prior to admission. During this admission, have been encouraging TID Tylenol administration. Spoke with family members at bedside who expressed concern that patient's pain had not been well controlled. Patient reports pain in both shoulders and per collateral from family, this is unchanged from her chronic character. Yesterday, team had spoken with son, Ag who wanted to try Tylenol ATC and avoid opiate Rx. Placed lidocaine patches at b/l shoulders without relief. Team spoke again with son over phone who was in agreement with initiation of Tramadol. Tramadol administered and will assess for relief. If relief, plan to d/c with this Rx available to patient.

## 2018-05-17 NOTE — DISCHARGE NOTE ADULT - MEDICATION SUMMARY - MEDICATIONS TO TAKE
I will START or STAY ON the medications listed below when I get home from the hospital:    glyBURIDE 5 mg oral tablet  -- 1 tab(s) by mouth once a day  -- Indication: For Diabetes mellitus type 2, noninsulin dependent    lovastatin  -- 40 milligram(s) by mouth once a day  -- Indication: For Atorvastatin    Symbicort 160 mcg-4.5 mcg/inh inhalation aerosol  -- 2 puff(s) inhaled 2 times a day  -- Indication: For Asthma    felodipine  -- 2 milligram(s) by mouth once a day  -- Indication: For Hypertension    Keflex 250 mg oral capsule  -- 1 cap(s) by mouth 2 times a day   -- Finish all this medication unless otherwise directed by prescriber.    -- Indication: For Urinary tract infection    montelukast 10 mg oral tablet  -- 1 tab(s) by mouth once a day  -- Indication: For Allergies I will START or STAY ON the medications listed below when I get home from the hospital:    traMADol 50 mg oral tablet  -- 1 tab(s) by mouth every 8 hours as needed for pain MDD:3 tabs per day  -- Indication: For Pain    glyBURIDE 5 mg oral tablet  -- 1 tab(s) by mouth once a day  -- Indication: For Diabetes mellitus type 2, noninsulin dependent    lovastatin  -- 40 milligram(s) by mouth once a day  -- Indication: For Atorvastatin    Symbicort 160 mcg-4.5 mcg/inh inhalation aerosol  -- 2 puff(s) inhaled 2 times a day  -- Indication: For Asthma    felodipine  -- 2 milligram(s) by mouth once a day  -- Indication: For Hypertension    Keflex 250 mg oral capsule  -- 1 cap(s) by mouth 2 times a day   -- Finish all this medication unless otherwise directed by prescriber.    -- Indication: For Urinary tract infection    montelukast 10 mg oral tablet  -- 1 tab(s) by mouth once a day  -- Indication: For Allergies

## 2018-05-17 NOTE — PROGRESS NOTE ADULT - ATTENDING COMMENTS
Agree with above  No further inpatient cardiac workup needed at this time.     Daniel Yang MD
Pt seen and examined by me Agree with resident.   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  BS ranging from 170-200s  continue SSS  5) DVT prox- HSQ.  6) Dispo - pt medically optimized for dc   DC planning 35 mins  team to dw with pts daughter to update
Pt seen and examined by me Agree with resident. Son at bedside  1) Acute encephalopathy likely secondary to infection- resolved- Pt AAOX 3 during my exam  2) UTI - On CTX, FU cultures  3) CKD 3 likely secondary to diabetic nephropathy - continue to monitor, avoid nephrotoxins  4) Uncontrolled DM with A1 C of 9.  BS ranging from 170-200s  continue SSS  5) DVT prox- HSQ

## 2018-05-17 NOTE — PROGRESS NOTE ADULT - PROBLEM SELECTOR PLAN 4
- Pt has had 3 separate episodes of foul smelling loose stools per family, however no episodes today  - Continue to monitor bowel movements  - no diarrhea inpatient

## 2018-05-17 NOTE — PROGRESS NOTE ADULT - PROBLEM SELECTOR PLAN 2
- this morning- patient AxOx3- very conversant  - per conversation with her son- HCP- patient back to her baseline  - Will treat UTI as outlined above  - Continue to monitor mental status, monitor for delirium in hospital setting

## 2018-05-17 NOTE — DISCHARGE NOTE ADULT - CONDITIONS AT DISCHARGE
She  is stable for discharge , independent instructions for discharge are give. She  is stable for discharge , independent instructions for discharge are given, Her Right Shin is red and her Ankle has an area of Rash and Brown discoloration with 2+ edema ; vital signs are stable . She is stable for discharge , partial assistance for her care no c/o dysuria, Instructions are given to her..

## 2018-05-17 NOTE — PROGRESS NOTE ADULT - PROBLEM SELECTOR PLAN 1
- patient with positive UA and admitted with AMS- may have AMS 2/2 to UTI  - Continue with ceftriaxone for uncomplicated UTI (likely 3 days of abx)- day 2/3 today, can transition to oral kephlex today  - Urine culture pending

## 2018-05-17 NOTE — DISCHARGE NOTE ADULT - CARE PROVIDER_API CALL
Daniel Yang), Cardiovascular Disease; Internal Medicine; Interventional Cardiology  [address] [facility] [address] [place] [address]  Phone: [phone]  Fax: [phone]

## 2018-05-17 NOTE — PROGRESS NOTE ADULT - PROBLEM SELECTOR PLAN 8
- Pt takes Glyburide at home. Glucose level on BMP in 200's  - Will start with low dose ISS, check FS qAC and qHS  - A1c 9.4

## 2018-05-17 NOTE — DISCHARGE NOTE ADULT - PATIENT PORTAL LINK FT
You can access the [facility], offered by [facility], by registering with the following website: [email or web address]

## 2018-06-14 ENCOUNTER — APPOINTMENT (OUTPATIENT)
Dept: ORTHOPEDIC SURGERY | Facility: CLINIC | Age: 83
End: 2018-06-14

## 2018-07-23 ENCOUNTER — INPATIENT (INPATIENT)
Facility: HOSPITAL | Age: 83
LOS: 6 days | Discharge: NURSING, REHAB OR LONG-TERM CARE | DRG: 65 | End: 2018-07-30
Attending: SPECIALIST | Admitting: SPECIALIST
Payer: COMMERCIAL

## 2018-07-23 VITALS — RESPIRATION RATE: 14 BRPM | SYSTOLIC BLOOD PRESSURE: 150 MMHG | HEART RATE: 76 BPM | DIASTOLIC BLOOD PRESSURE: 74 MMHG

## 2018-07-23 DIAGNOSIS — R29.898 OTHER SYMPTOMS AND SIGNS INVOLVING THE MUSCULOSKELETAL SYSTEM: ICD-10-CM

## 2018-07-23 LAB
ALBUMIN SERPL ELPH-MCNC: 3.8 G/DL — SIGNIFICANT CHANGE UP (ref 3.3–5)
ALP SERPL-CCNC: 67 U/L — SIGNIFICANT CHANGE UP (ref 40–120)
ALT FLD-CCNC: 13 U/L — SIGNIFICANT CHANGE UP (ref 10–45)
AMMONIA BLD-MCNC: 42 UMOL/L — SIGNIFICANT CHANGE UP (ref 11–55)
ANION GAP SERPL CALC-SCNC: 14 MMOL/L — SIGNIFICANT CHANGE UP (ref 5–17)
APPEARANCE UR: CLEAR — SIGNIFICANT CHANGE UP
APTT BLD: 28.2 SEC — SIGNIFICANT CHANGE UP (ref 27.5–37.4)
AST SERPL-CCNC: 26 U/L — SIGNIFICANT CHANGE UP (ref 10–40)
BACTERIA # UR AUTO: ABNORMAL /HPF
BASE EXCESS BLDV CALC-SCNC: 0.1 MMOL/L — SIGNIFICANT CHANGE UP (ref -2–2)
BASOPHILS # BLD AUTO: 0.1 K/UL — SIGNIFICANT CHANGE UP (ref 0–0.2)
BASOPHILS NFR BLD AUTO: 0.8 % — SIGNIFICANT CHANGE UP (ref 0–2)
BILIRUB SERPL-MCNC: 0.2 MG/DL — SIGNIFICANT CHANGE UP (ref 0.2–1.2)
BILIRUB UR-MCNC: NEGATIVE — SIGNIFICANT CHANGE UP
BLD GP AB SCN SERPL QL: NEGATIVE — SIGNIFICANT CHANGE UP
BUN SERPL-MCNC: 33 MG/DL — HIGH (ref 7–23)
CA-I SERPL-SCNC: 1.26 MMOL/L — SIGNIFICANT CHANGE UP (ref 1.12–1.3)
CALCIUM SERPL-MCNC: 9.6 MG/DL — SIGNIFICANT CHANGE UP (ref 8.4–10.5)
CHLORIDE BLDV-SCNC: 105 MMOL/L — SIGNIFICANT CHANGE UP (ref 96–108)
CHLORIDE SERPL-SCNC: 103 MMOL/L — SIGNIFICANT CHANGE UP (ref 96–108)
CO2 BLDV-SCNC: 27 MMOL/L — SIGNIFICANT CHANGE UP (ref 22–30)
CO2 SERPL-SCNC: 23 MMOL/L — SIGNIFICANT CHANGE UP (ref 22–31)
COLOR SPEC: YELLOW — SIGNIFICANT CHANGE UP
COMMENT - URINE: SIGNIFICANT CHANGE UP
CREAT SERPL-MCNC: 1.65 MG/DL — HIGH (ref 0.5–1.3)
DIFF PNL FLD: ABNORMAL
EOSINOPHIL # BLD AUTO: 0.1 K/UL — SIGNIFICANT CHANGE UP (ref 0–0.5)
EOSINOPHIL NFR BLD AUTO: 1 % — SIGNIFICANT CHANGE UP (ref 0–6)
GAS PNL BLDV: 136 MMOL/L — SIGNIFICANT CHANGE UP (ref 136–145)
GAS PNL BLDV: SIGNIFICANT CHANGE UP
GAS PNL BLDV: SIGNIFICANT CHANGE UP
GLUCOSE BLDV-MCNC: 247 MG/DL — HIGH (ref 70–99)
GLUCOSE SERPL-MCNC: 273 MG/DL — HIGH (ref 70–99)
GLUCOSE UR QL: ABNORMAL
HCO3 BLDV-SCNC: 26 MMOL/L — SIGNIFICANT CHANGE UP (ref 21–29)
HCT VFR BLD CALC: 37.5 % — SIGNIFICANT CHANGE UP (ref 34.5–45)
HCT VFR BLDA CALC: 30 % — LOW (ref 39–50)
HGB BLD CALC-MCNC: 9.9 G/DL — LOW (ref 11.5–15.5)
HGB BLD-MCNC: 12.2 G/DL — SIGNIFICANT CHANGE UP (ref 11.5–15.5)
INR BLD: 0.97 RATIO — SIGNIFICANT CHANGE UP (ref 0.88–1.16)
KETONES UR-MCNC: NEGATIVE — SIGNIFICANT CHANGE UP
LACTATE BLDV-MCNC: 2.3 MMOL/L — HIGH (ref 0.7–2)
LEUKOCYTE ESTERASE UR-ACNC: ABNORMAL
LYMPHOCYTES # BLD AUTO: 28.2 % — SIGNIFICANT CHANGE UP (ref 13–44)
LYMPHOCYTES # BLD AUTO: 3 K/UL — SIGNIFICANT CHANGE UP (ref 1–3.3)
MAGNESIUM SERPL-MCNC: 2.2 MG/DL — SIGNIFICANT CHANGE UP (ref 1.6–2.6)
MCHC RBC-ENTMCNC: 26.7 PG — LOW (ref 27–34)
MCHC RBC-ENTMCNC: 32.4 GM/DL — SIGNIFICANT CHANGE UP (ref 32–36)
MCV RBC AUTO: 82.4 FL — SIGNIFICANT CHANGE UP (ref 80–100)
MONOCYTES # BLD AUTO: 0.8 K/UL — SIGNIFICANT CHANGE UP (ref 0–0.9)
MONOCYTES NFR BLD AUTO: 7.8 % — SIGNIFICANT CHANGE UP (ref 2–14)
NEUTROPHILS # BLD AUTO: 6.7 K/UL — SIGNIFICANT CHANGE UP (ref 1.8–7.4)
NEUTROPHILS NFR BLD AUTO: 62.2 % — SIGNIFICANT CHANGE UP (ref 43–77)
NITRITE UR-MCNC: NEGATIVE — SIGNIFICANT CHANGE UP
PCO2 BLDV: 50 MMHG — SIGNIFICANT CHANGE UP (ref 35–50)
PH BLDV: 7.34 — LOW (ref 7.35–7.45)
PH UR: 6 — SIGNIFICANT CHANGE UP (ref 5–8)
PLATELET # BLD AUTO: 248 K/UL — SIGNIFICANT CHANGE UP (ref 150–400)
PO2 BLDV: 38 MMHG — SIGNIFICANT CHANGE UP (ref 25–45)
POTASSIUM BLDV-SCNC: 4 MMOL/L — SIGNIFICANT CHANGE UP (ref 3.5–5.3)
POTASSIUM SERPL-MCNC: 4.1 MMOL/L — SIGNIFICANT CHANGE UP (ref 3.5–5.3)
POTASSIUM SERPL-SCNC: 4.1 MMOL/L — SIGNIFICANT CHANGE UP (ref 3.5–5.3)
PROT SERPL-MCNC: 6.4 G/DL — SIGNIFICANT CHANGE UP (ref 6–8.3)
PROT UR-MCNC: 150 MG/DL
PROTHROM AB SERPL-ACNC: 10.5 SEC — SIGNIFICANT CHANGE UP (ref 9.8–12.7)
RBC # BLD: 4.55 M/UL — SIGNIFICANT CHANGE UP (ref 3.8–5.2)
RBC # FLD: 13.4 % — SIGNIFICANT CHANGE UP (ref 10.3–14.5)
RBC CASTS # UR COMP ASSIST: ABNORMAL /HPF (ref 0–2)
RH IG SCN BLD-IMP: POSITIVE — SIGNIFICANT CHANGE UP
RH IG SCN BLD-IMP: POSITIVE — SIGNIFICANT CHANGE UP
SAO2 % BLDV: 65 % — LOW (ref 67–88)
SODIUM SERPL-SCNC: 140 MMOL/L — SIGNIFICANT CHANGE UP (ref 135–145)
SP GR SPEC: 1.01 — SIGNIFICANT CHANGE UP (ref 1.01–1.02)
TROPONIN T, HIGH SENSITIVITY RESULT: 26 NG/L — SIGNIFICANT CHANGE UP (ref 0–51)
UROBILINOGEN FLD QL: NEGATIVE — SIGNIFICANT CHANGE UP
WBC # BLD: 10.8 K/UL — HIGH (ref 3.8–10.5)
WBC # FLD AUTO: 10.8 K/UL — HIGH (ref 3.8–10.5)
WBC UR QL: SIGNIFICANT CHANGE UP /HPF (ref 0–5)

## 2018-07-23 PROCEDURE — 93010 ELECTROCARDIOGRAM REPORT: CPT

## 2018-07-23 PROCEDURE — 73564 X-RAY EXAM KNEE 4 OR MORE: CPT | Mod: 26,50

## 2018-07-23 PROCEDURE — 99285 EMERGENCY DEPT VISIT HI MDM: CPT | Mod: 25

## 2018-07-23 PROCEDURE — 71045 X-RAY EXAM CHEST 1 VIEW: CPT | Mod: 26

## 2018-07-23 RX ORDER — SODIUM CHLORIDE 9 MG/ML
1000 INJECTION, SOLUTION INTRAVENOUS
Qty: 0 | Refills: 0 | Status: DISCONTINUED | OUTPATIENT
Start: 2018-07-23 | End: 2018-07-30

## 2018-07-23 RX ORDER — ATORVASTATIN CALCIUM 80 MG/1
80 TABLET, FILM COATED ORAL AT BEDTIME
Qty: 0 | Refills: 0 | Status: DISCONTINUED | OUTPATIENT
Start: 2018-07-23 | End: 2018-07-30

## 2018-07-23 RX ORDER — ASPIRIN/CALCIUM CARB/MAGNESIUM 324 MG
81 TABLET ORAL DAILY
Qty: 0 | Refills: 0 | Status: DISCONTINUED | OUTPATIENT
Start: 2018-07-23 | End: 2018-07-25

## 2018-07-23 RX ORDER — INSULIN LISPRO 100/ML
VIAL (ML) SUBCUTANEOUS
Qty: 0 | Refills: 0 | Status: DISCONTINUED | OUTPATIENT
Start: 2018-07-23 | End: 2018-07-30

## 2018-07-23 RX ORDER — SODIUM CHLORIDE 9 MG/ML
1000 INJECTION, SOLUTION INTRAVENOUS
Qty: 0 | Refills: 0 | Status: DISCONTINUED | OUTPATIENT
Start: 2018-07-23 | End: 2018-07-24

## 2018-07-23 RX ORDER — GLUCAGON INJECTION, SOLUTION 0.5 MG/.1ML
1 INJECTION, SOLUTION SUBCUTANEOUS ONCE
Qty: 0 | Refills: 0 | Status: DISCONTINUED | OUTPATIENT
Start: 2018-07-23 | End: 2018-07-30

## 2018-07-23 RX ORDER — HEPARIN SODIUM 5000 [USP'U]/ML
5000 INJECTION INTRAVENOUS; SUBCUTANEOUS EVERY 12 HOURS
Qty: 0 | Refills: 0 | Status: DISCONTINUED | OUTPATIENT
Start: 2018-07-23 | End: 2018-07-25

## 2018-07-23 RX ADMIN — SODIUM CHLORIDE 100 MILLILITER(S): 9 INJECTION, SOLUTION INTRAVENOUS at 18:45

## 2018-07-23 NOTE — H&P ADULT - ATTENDING COMMENTS
VASCULAR NEUROLOGY ATTENDING  The patient is seen and examined the history and imaging are reviewed. I agree with the resident note unless otherwise noted. Patient with acute R ABELARDO territory infarcts and subacute L subcortical infarcts concern for cardioembolism. Exam nearly at neurologic baseline with 4/5 L sided HP. Will obtain TTE, will need long term tele monitoring to r/o PAF. ASA Statin PT/OT/SS plan for early hospital discharge.

## 2018-07-23 NOTE — ED ADULT NURSE REASSESSMENT NOTE - NS ED NURSE REASSESS COMMENT FT1
Pt currently comfortable w/ no complaints. VSS. Will cont to monitor. Awaiting bed assignment. Family at bedside. NSR on cardiac monitor. Passed dysphagia.
Report received from prior RN. Pt resting comfortably with family at bedside. VSS. Pt on cardiac monitor, normal sinus rhythm noted.   Safety maintained at all times, bed in lowest position, call bell in reach.   Will continue to monitor closely.

## 2018-07-23 NOTE — ED ADULT NURSE REASSESSMENT NOTE - NURSING MUSC EXTREMITY NORMAL ROM
right upper extremity/left upper extremity/right lower extremity right upper extremity/right lower extremity

## 2018-07-23 NOTE — H&P ADULT - NSHPPHYSICALEXAM_GEN_ALL_CORE
Gen: Elderly female, NAD    Neurologic Exam:  Exam limited secondary to lack of cooperation.     Mental Status: AAOx3. Conversing appropriately. Follows simple and complex commands. No dysarthria. Speech is fluent. Language appropriate.   Cranial nerves: Right pupil is ERRL. Left pupil is not reactive to light (Chronic). +Left Monocular vision loss (chronic).  EOMI. V1-V3 intact. No facial asymmetry. Hearing intact bilaterally. Sternocleidomastoid and Trapezius intact bilaterally.   Motor: Gen: Elderly female, NAD    Neurologic Exam:  Exam limited secondary to lack of cooperation.     Mental Status: AAOx3. Conversing appropriately. Follows simple and complex commands. No dysarthria. Speech is fluent. Language appropriate.   Cranial nerves: Right pupil is ERRL. Left pupil is not reactive to light (Chronic). +Left Monocular vision loss (chronic).  EOMI. V1-V3 intact. No facial asymmetry. Hearing intact bilaterally. Sternocleidomastoid and Trapezius intact bilaterally.   Motor:  Deltoid      Biceps     Triceps     Strength        Hip Flexion       Knee Flexion        Knee Extension       Plantar flexion       Dorsiflexion  L            3/5            3/5           3/5              4-/5                    3/5                     4/5                      4/5                        4/5                     4/5                           R           4+/5         4+/5         4+/5             5/5                     5/5                     5/5                      5/5                        5/5                     5/5                             Reflexes:  2+ biceps, triceps, BR bilaterally. 2+ in right patella. 2+ in left patella. +UP going plantar on R  Sensation: Grossly intact to light touch  Coordination: FNF normal.   Gait: +Unsteady. Unable to assess ataxia as patient could not stand without assistance

## 2018-07-23 NOTE — ED PROVIDER NOTE - ATTENDING CONTRIBUTION TO CARE
------------ATTENDING NOTE------------   RHD pt sent to ED by family for concerns of fall last PM and noticing LUE weakness and decreased function since 10 AM today, limited by pt unable to recall specific events, L facial droop and dysarthria per EMS chronic, HD stable, awaiting labs/imaging and dw family -->  - Garcia Lawson MD   --------------------------------------------- ------------ATTENDING NOTE------------   RHD pt sent to ED by family for concerns of fall last PM and noticing LUE weakness and decreased function since 10 AM today, limited by pt unable to recall specific events, L facial droop and dysarthria per EMS chronic, HD stable, awaiting labs/imaging and dw family --> daughter in ED, describes pt's fall as bending last night while walking to bathroom and slumped over to side and wedged against wall, no head injury or LOC or AMS, noticed abnormal findings since waking this AM -->  - Garcia Lawson MD   ---------------------------------------------

## 2018-07-23 NOTE — ED PROVIDER NOTE - OBJECTIVE STATEMENT
89F w/ pmh HTN, HLD, DM, hypothyroidism bib EMS for weakness and urinary incontinence since falling yesterday night. Last night patient was walking w/ cane at home, bent down to grab a pillow, and fell wedged between bed and wall - pt's daughter (at bedside now) heard it and immediately saw patient, saw her wedged w/ knees against bed - helped her up and into bed. Since then incontinent and has been having difficulty walking. No confusion, change in speech. Mentating at baseline. +chronic unchanged bilat shoulder, back pain. +increased bilat knee pain.    PCP: Louis Ortiz

## 2018-07-23 NOTE — ED ADULT NURSE REASSESSMENT NOTE - NURSING MUSC EXTREMITY LIMITED ROM
left lower extremity L arm weakness, L leg pain w/ movement/left upper extremity/left lower extremity

## 2018-07-23 NOTE — ED ADULT NURSE NOTE - OBJECTIVE STATEMENT
89F pt AxOx3 BIBA from home c/o L-sided weakness. Pt states she had mech fall yesterday, denies LOC/head inj. Pt c/o b/l knee pain, worse on L knee. Pt denies CP/SOB/N/V/D/fever/chills/dizziness. Pt has L facial droop s/t previous stroke. As per daughter, pt was LKW @ 1200 today and was ambulating well and @ baseline status. On assessment, gross neuro intact, weakness noted to lower extremities, +pedal pulses, no edema noted. No sensory deficits noted. No dysphagia noted. #20G RAC, labs drawn and sent. VSS. CM in place - NSR. . Safety maintained. MD at bedside for eval. Will continue to monitor. Daughter at bedside.

## 2018-07-23 NOTE — H&P ADULT - HISTORY OF PRESENT ILLNESS
89 y/o Right handed Swedish female with past medical history of DM2, HTN, HLD, Hypothyroidism, Asthma presents with left arm weakness and difficulty walking. Patient states she was in her usual state of health until yesterday evening when she tried to bend down to pick something up off the floor, was unable to stand up, and subsequently fell to her knees. Patient then required help to stand up and went to bed. On 7/23/2018 afternoon, around 1 pm, patient was noted by family to have her left arm handing limp while sitting down. Patient then attempted to stand but was unable to and required assistance. At that time, patient denied any accompanying changes in vision, changes in speech, numbness, or tingling, dizziness, or confusion.   At baseline, patient ambulates with a cane.    Patient states she takes Aspirin 81mg daily and Lovastatin but is unsure of her other medications.

## 2018-07-23 NOTE — ED PROVIDER NOTE - PROGRESS NOTE DETAILS
John Dickerson PGY2: d/w neuro resident - rec admission, he will d/w stroke attg and call me back w/ further recs, admit to floor vs stroke unit John Dickerson PGY2: pged neuro resident John Dickerson PGY2: d/w neuro - admit to stroke service

## 2018-07-23 NOTE — ED PROVIDER NOTE - PHYSICAL EXAMINATION
*GEN:   in no acute distress, AOx3    ///    *EYES:   pupils equally round and reactive to light, extra-occular movements intact    ///    *HEENT:   airway patent, moist mucosal membranes    ///    *CV:   regular rate and rhythm    ///    *RESP:   clear to auscultation bilaterally, non-labored    ///    *ABD:   soft, non-tender    ///    *:   no cva/flank tenderness    ///    *MSK:   baseline decreased ROM throughout 2/2 arthritis; mild tenderness knees bilaterally, L > R swelling    ///    *SKIN:   dry, intact    ///    *NEURO:   AOx3, mild L facial droop (baseline per family), cranial nerves intact throughout, generalized weakness / limited ROM per baseline, no focal loss of sensation

## 2018-07-23 NOTE — H&P ADULT - ASSESSMENT
89 y/o Right handed [place]er female with past medical history of DM2, HTN, HLD, Hypothyroidism, Asthma presents with left arm weakness and difficulty walking that started yesterday evening into today afternoon (1pm) with gradual improvement in symptoms. Labs WNL. CT head demonstrating small lucency right frontal lobe may be infarct of indeterminate age. Neurologic exam remarkable for LUE weakness and LLE weakness.     Impression: R MCA distribution infarct 2/2 LVD vs Left hemiparesis 2/2 traumatic fall.     Recommendations:   [] Admit to Neurology Stroke Service  [] MRI brain without contrast  [] MRA head without contrast and neck with contrast  [] TTE with bubble study for possible valvular heart disease  [] ASA 81 mg PO QD once patient passes swallow evaluation, if not,  [place]  [] Lipitor 80 mg PO QHS  [] DVT prophylaxis with heparin  [] HbA1c, LDL and continue with aggressive vascular risk factors modifications   [] NPO until patient passes dysphagia screen  [] Tele monitor  [] PT/OT/S&S eval 89 y/o Right handed English female with past medical history of DM2, HTN, HLD, Hypothyroidism, Asthma presents with left arm weakness and difficulty walking that started yesterday evening into today afternoon (1pm) with gradual improvement in symptoms. Labs WNL. CT head demonstrating small lucency right frontal lobe may be infarct of indeterminate age. Neurologic exam remarkable for LUE weakness and LLE weakness. NIHSS: 3. MRS: 3.    Impression: R MCA distribution infarct 2/2 LVD vs Left hemiparesis 2/2 traumatic fall.     Recommendations:   [] Admit to Neurology Stroke Service  [] MRI brain without contrast  [] MRA head without contrast and neck with contrast  [] TTE with bubble study for possible valvular heart disease  [] ASA 81 mg PO QD once patient passes swallow evaluation, if not,  [place]  [] Lipitor 80 mg PO QHS  [] DVT prophylaxis with heparin  [] HbA1c, LDL and continue with aggressive vascular risk factors modifications   [] NPO until patient passes dysphagia screen  [] Tele monitor  [] PT/OT/S&S eval

## 2018-07-23 NOTE — H&P ADULT - NSHPLABSRESULTS_GEN_ALL_CORE
12.2   10.8  )-----------( 248      ( 23 Jul 2018 18:24 )             37.5     07-23    140  |  103  |  33<H>  ----------------------------<  273<H>  4.1   |  23  |  1.65<H>    Ca    9.6      23 Jul 2018 18:24  Mg     2.2     07-23    TPro  6.4  /  Alb  3.8  /  TBili  0.2  /  DBili  x   /  AST  26  /  ALT  13  /  AlkPhos  67  07-23      < from: CT Head No Cont (07.23.18 @ 19:57) >    IMPRESSION:  Small lucency right frontal lobe may be infarct of indeterminate age.   Underlying lesion cannot be completely excluded and MRI follow-up can be   obtained for further evaluation.    No acute intracranial hemorrhage, significant mass effect or midline   shift.    < end of copied text >

## 2018-07-24 DIAGNOSIS — E11.9 TYPE 2 DIABETES MELLITUS WITHOUT COMPLICATIONS: ICD-10-CM

## 2018-07-24 DIAGNOSIS — I63.9 CEREBRAL INFARCTION, UNSPECIFIED: ICD-10-CM

## 2018-07-24 DIAGNOSIS — I48.91 UNSPECIFIED ATRIAL FIBRILLATION: ICD-10-CM

## 2018-07-24 DIAGNOSIS — E03.9 HYPOTHYROIDISM, UNSPECIFIED: ICD-10-CM

## 2018-07-24 LAB
CHOLEST SERPL-MCNC: 251 MG/DL — HIGH (ref 10–199)
HBA1C BLD-MCNC: 7.4 % — HIGH (ref 4–5.6)
HDLC SERPL-MCNC: 55 MG/DL — SIGNIFICANT CHANGE UP (ref 40–125)
LIPID PNL WITH DIRECT LDL SERPL: 171 MG/DL — HIGH
TOTAL CHOLESTEROL/HDL RATIO MEASUREMENT: 4.6 RATIO — SIGNIFICANT CHANGE UP (ref 3.3–7.1)
TRIGL SERPL-MCNC: 126 MG/DL — SIGNIFICANT CHANGE UP (ref 10–149)
TSH SERPL-MCNC: 1.1 UIU/ML — SIGNIFICANT CHANGE UP (ref 0.27–4.2)

## 2018-07-24 PROCEDURE — 70547 MR ANGIOGRAPHY NECK W/O DYE: CPT | Mod: 26

## 2018-07-24 PROCEDURE — 70551 MRI BRAIN STEM W/O DYE: CPT | Mod: 26

## 2018-07-24 RX ORDER — DEXTROSE 50 % IN WATER 50 %
12.5 SYRINGE (ML) INTRAVENOUS ONCE
Qty: 0 | Refills: 0 | Status: DISCONTINUED | OUTPATIENT
Start: 2018-07-24 | End: 2018-07-30

## 2018-07-24 RX ORDER — DEXTROSE 50 % IN WATER 50 %
25 SYRINGE (ML) INTRAVENOUS ONCE
Qty: 0 | Refills: 0 | Status: DISCONTINUED | OUTPATIENT
Start: 2018-07-24 | End: 2018-07-30

## 2018-07-24 RX ORDER — ACETAMINOPHEN 500 MG
650 TABLET ORAL EVERY 6 HOURS
Qty: 0 | Refills: 0 | Status: DISCONTINUED | OUTPATIENT
Start: 2018-07-24 | End: 2018-07-30

## 2018-07-24 RX ORDER — DEXTROSE 50 % IN WATER 50 %
15 SYRINGE (ML) INTRAVENOUS ONCE
Qty: 0 | Refills: 0 | Status: DISCONTINUED | OUTPATIENT
Start: 2018-07-24 | End: 2018-07-30

## 2018-07-24 RX ADMIN — Medication 650 MILLIGRAM(S): at 03:00

## 2018-07-24 RX ADMIN — Medication 1: at 13:09

## 2018-07-24 RX ADMIN — Medication 81 MILLIGRAM(S): at 02:20

## 2018-07-24 RX ADMIN — Medication 650 MILLIGRAM(S): at 02:19

## 2018-07-24 RX ADMIN — HEPARIN SODIUM 5000 UNIT(S): 5000 INJECTION INTRAVENOUS; SUBCUTANEOUS at 17:41

## 2018-07-24 RX ADMIN — HEPARIN SODIUM 5000 UNIT(S): 5000 INJECTION INTRAVENOUS; SUBCUTANEOUS at 05:34

## 2018-07-24 RX ADMIN — Medication 81 MILLIGRAM(S): at 13:09

## 2018-07-24 RX ADMIN — Medication 1: at 08:56

## 2018-07-24 RX ADMIN — ATORVASTATIN CALCIUM 80 MILLIGRAM(S): 80 TABLET, FILM COATED ORAL at 22:30

## 2018-07-24 NOTE — CONSULT NOTE ADULT - SUBJECTIVE AND OBJECTIVE BOX
CHIEF COMPLAINT: Uncontrolled HTN      HISTORY OF PRESENT ILLNESS:  89 y/o Right handed [place] female with past medical history of DM2, HTN, HLD, Hypothyroidism, Asthma presents with left arm weakness and difficulty walking. Patient states she was in her usual state of health until yesterday evening when she tried to bend down to pick something up off the floor, was unable to stand up, and subsequently fell to her knees. Patient then required help to stand up and went to bed. On 7/23/2018 afternoon, around 1 pm, patient was noted by family to have her left arm handing limp while sitting down. Patient then attempted to stand but was unable to and required assistance. At that time, patient denied any accompanying changes in vision, changes in speech, numbness, or tingling, dizziness, or confusion.   At baseline, patient ambulates with a cane.    Patient states she takes Aspirin 81mg daily and Lovastatin but is unsure of her other medications.       PAST MEDICAL & SURGICAL HISTORY:  DM (diabetes mellitus)  Hypothyroidism  HLD (hyperlipidemia)  HTN (hypertension)  No significant past surgical history          MEDICATIONS:  aspirin enteric coated 81 milliGRAM(s) Oral daily  heparin  Injectable 5000 Unit(s) SubCutaneous every 12 hours        acetaminophen   Tablet. 650 milliGRAM(s) Oral every 6 hours PRN      atorvastatin 80 milliGRAM(s) Oral at bedtime  glucagon  Injectable 1 milliGRAM(s) IntraMuscular once PRN  insulin lispro (HumaLOG) corrective regimen sliding scale   SubCutaneous three times a day before meals    dextrose 5%. 1000 milliLiter(s) IV Continuous <Continuous>      FAMILY HISTORY:      SOCIAL HISTORY:    [ ] Non-smoker  [ ] Smoker  [ ] Alcohol    Allergies    No Known Allergies    Intolerances    	    REVIEW OF SYSTEMS:  CONSTITUTIONAL: No fever, weight loss, or fatigue  EYES: No eye pain, visual disturbances, or discharge  ENMT:  No difficulty hearing, tinnitus, vertigo; No sinus or throat pain  NECK: No pain or stiffness  RESPIRATORY: No cough, wheezing, chills or hemoptysis; No Shortness of Breath  CARDIOVASCULAR: No chest pain, palpitations, passing out, dizziness, or leg swelling  GASTROINTESTINAL: No abdominal or epigastric pain. No nausea, vomiting, or hematemesis; No diarrhea or constipation. No melena or hematochezia.  GENITOURINARY: No dysuria, frequency, hematuria, or incontinence  NEUROLOGICAL: No headaches, memory loss, loss of strength, numbness, or tremors  SKIN: No itching, burning, rashes, or lesions   LYMPH Nodes: No enlarged glands  ENDOCRINE: No heat or cold intolerance; No hair loss  MUSCULOSKELETAL: No joint pain or swelling; No muscle, back, or extremity pain  PSYCHIATRIC: No depression, anxiety, mood swings, or difficulty sleeping  HEME/LYMPH: No easy bruising, or bleeding gums  ALLERY AND IMMUNOLOGIC: No hives or eczema	    [ ] All others negative	  [ ] Unable to obtain    PHYSICAL EXAM:  T(C): 36.4 (07-24-18 @ 09:07), Max: 37.3 (07-23-18 @ 18:10)  HR: 62 (07-24-18 @ 09:07) (61 - 87)  BP: 184/74 (07-24-18 @ 06:36) (150/74 - 188/90)  RR: 18 (07-24-18 @ 09:07) (14 - 18)  SpO2: 97% (07-24-18 @ 09:07) (97% - 100%)  Wt(kg): --  I&O's Summary      Appearance: Normal	  HEENT:   Normal oral mucosa, PERRL, EOMI	  Lymphatic: No lymphadenopathy  Cardiovascular: Normal S1 S2, No JVD, No murmurs, No edema  Respiratory: Lungs clear to auscultation	  Psychiatry: A & O x 3, Mood & affect appropriate  Gastrointestinal:  Soft, Non-tender, + BS	  Skin: No rashes, No ecchymoses, No cyanosis	  Neurologic: Non-focal  Extremities: Normal range of motion, No clubbing, cyanosis or edema  Vascular: Peripheral pulses palpable 2+ bilaterally    TELEMETRY: 	    ECG:  	NSR, LAFB. no acute ischemic stt changes   RADIOLOGY:  < from: CT Head No Cont (07.23.18 @ 19:57) >    EXAM:  CT BRAIN                            PROCEDURE DATE:  07/23/2018            INTERPRETATION:  HISTORY: Left upper extremity weakness, evaluate for CVA.    Technique: CT of the head was performed without intravenous contrast.    Multiple contiguous axial images were acquired from the skullbase to the   vertex without the administration of intravenous contrast.  Coronal and   sagittal reformations were made.    COMPARISON: None.    FINDINGS:  There is a small region of hypodensity in the right mesial inferior   frontal lobe, compatible with age-indeterminate infarct.    No acute intracranial hemorrhage, significant mass effect or midline   shift.    The ventricles and sulci are within normal limits for the patient's age   without evidence of hydrocephalus. Small chronic lacunar infarct in the   left corona radiata. Mild patchy periventricular hypodensities,   compatible with chronic microvascular changes.    The calvarium is intact. The visualized intraorbital compartments,   paranasal sinuses and mastoid complexes are free of acute disease.    IMPRESSION:  Small lucency right frontal lobe may be infarct of indeterminate age.   Underlying lesion cannot be completely excluded and MRI follow-up can be   obtained for further evaluation.    No acute intracranial hemorrhage, significant mass effect or midline   shift.    Findings discussed by Dr. Bennett to Dr. Lawson on 7/23/2018 at 8:20 PM.                  TARAN BENNETT M.D., RADIOLOGY RESIDENT  This document has been electronically signed.  JUANY GUAMAN M.D., ATTENDING RADIOLOGIST  This document has been electronically signed. Jul 23 2018  8:31PM                < end of copied text >    OTHER: 	  	  LABS:	 	    CARDIAC MARKERS:                                  12.2   10.8  )-----------( 248      ( 23 Jul 2018 18:24 )             37.5     07-23    140  |  103  |  33<H>  ----------------------------<  273<H>  4.1   |  23  |  1.65<H>    Ca    9.6      23 Jul 2018 18:24  Mg     2.2     07-23    TPro  6.4  /  Alb  3.8  /  TBili  0.2  /  DBili  x   /  AST  26  /  ALT  13  /  AlkPhos  67  07-23    proBNP:   Lipid Profile:   HgA1c: Hemoglobin A1C, Whole Blood: 7.4 % (07-24 @ 08:23)    TSH: Thyroid Stimulating Hormone, Serum: 1.10 uIU/mL (07-23 @ 21:16) CHIEF COMPLAINT: Uncontrolled HTN      HISTORY OF PRESENT ILLNESS:  89 y/o Right handed [place] female with past medical history of DM2, HTN, HLD, Hypothyroidism, Asthma presents with left arm weakness and difficulty walking. Patient states she was in her usual state of health until yesterday evening when she tried to bend down to pick something up off the floor, was unable to stand up, and subsequently fell to her knees. Patient then required help to stand up and went to bed. On 7/23/2018 afternoon, around 1 pm, patient was noted by family to have her left arm handing limp while sitting down. Patient then attempted to stand but was unable to and required assistance. At that time, patient denied any accompanying changes in vision, changes in speech, numbness, or tingling, dizziness, or confusion.   At baseline, patient ambulates with a cane.    Patient states she takes Aspirin 81mg daily and Lovastatin but is unsure of her other medications.   As per family at bedside, patient does have a history of PAF and was previously on systemic AC but due to GIB, AC was discontinue       PAST MEDICAL & SURGICAL HISTORY:  DM (diabetes mellitus)  Hypothyroidism  HLD (hyperlipidemia)  HTN (hypertension)  No significant past surgical history  PAF           MEDICATIONS:  aspirin enteric coated 81 milliGRAM(s) Oral daily  heparin  Injectable 5000 Unit(s) SubCutaneous every 12 hours        acetaminophen   Tablet. 650 milliGRAM(s) Oral every 6 hours PRN      atorvastatin 80 milliGRAM(s) Oral at bedtime  glucagon  Injectable 1 milliGRAM(s) IntraMuscular once PRN  insulin lispro (HumaLOG) corrective regimen sliding scale   SubCutaneous three times a day before meals    dextrose 5%. 1000 milliLiter(s) IV Continuous <Continuous>      FAMILY HISTORY:      SOCIAL HISTORY:    [ ] Non-smoker  [ ] Smoker  [ ] Alcohol    Allergies    No Known Allergies    Intolerances    	    REVIEW OF SYSTEMS:  CONSTITUTIONAL: No fever, weight loss, + fatigue  EYES: No eye pain, visual disturbances, or discharge  ENMT:  No difficulty hearing, tinnitus, vertigo; No sinus or throat pain  NECK: No pain or stiffness  RESPIRATORY: No cough, wheezing, chills or hemoptysis; No Shortness of Breath  CARDIOVASCULAR: No chest pain, palpitations, passing out, +dizziness, or leg swelling  GASTROINTESTINAL: No abdominal or epigastric pain. No nausea, vomiting, or hematemesis; No diarrhea or constipation. No melena or hematochezia.  GENITOURINARY: No dysuria, frequency, hematuria, or incontinence  NEUROLOGICAL: + headaches, memory loss, loss of strength, numbness, or tremors  SKIN: No itching, burning, rashes, or lesions   LYMPH Nodes: No enlarged glands  ENDOCRINE: No heat or cold intolerance; No hair loss  MUSCULOSKELETAL: + joint pain or swelling; No muscle, back, or extremity pain  PSYCHIATRIC: No depression, anxiety, mood swings, or difficulty sleeping  HEME/LYMPH: No easy bruising, or bleeding gums  ALLERY AND IMMUNOLOGIC: No hives or eczema	    [ ] All others negative	  [ ] Unable to obtain    PHYSICAL EXAM:  T(C): 36.4 (07-24-18 @ 09:07), Max: 37.3 (07-23-18 @ 18:10)  HR: 62 (07-24-18 @ 09:07) (61 - 87)  BP: 184/74 (07-24-18 @ 06:36) (150/74 - 188/90)  RR: 18 (07-24-18 @ 09:07) (14 - 18)  SpO2: 97% (07-24-18 @ 09:07) (97% - 100%)  Wt(kg): --  I&O's Summary      Appearance: NAD	  HEENT:   Normal oral mucosa, PERRL, EOMI	  Lymphatic: No lymphadenopathy  Cardiovascular: Normal S1 S2, No JVD, No murmurs, No edema  Respiratory: Lungs clear to auscultation	  Psychiatry: A & O x 3, Mood & affect appropriate  Gastrointestinal:  Soft, Non-tender, + BS	  Skin: No rashes, No ecchymoses, No cyanosis	  Neurologic: Non-focal  Extremities: decreased range of motion, No clubbing, cyanosis or edema  Vascular: Peripheral pulses palpable 2+ bilaterally    TELEMETRY: 	    ECG:  	NSR, LAFB. no acute ischemic stt changes   RADIOLOGY:     < from: MR Angio Neck No Cont (07.24.18 @ 10:38) >      EXAM:  MR ANGIO BRAIN                          EXAM:  MR ANGIO NECK                          EXAM:  MR BRAIN                            PROCEDURE DATE:  07/24/2018            INTERPRETATION:  Noncontrast MRI of the brain, MRA of the [place] of   [place], and MRA of the neck.    CLINICAL INDICATION: Left upper and lower extremity weakness    TECHNIQUE: Multiplanar, multisequence MR images of the brain, 3-D   time-of-flight images of the [place] of [place] and neck, and 2-D time of   flight images ofthe neck were obtained without the intravenous   administration of contrast.  2-D and 3-D time-of-flight images were   reformatted using MIP protocol targeted over the head and neck.    COMPARISON: Head CT dated 7/23/2018     FINDINGS:     Brain MRI:    There is an acute infarct along the inferior mesial right frontal lobe   corresponding to the lucency seen on the previous MRI. Additional focal   areas of restricted diffusion are noted more superiorly within the   distribution of the right anterior cerebral artery consistent with an   acute infarct. There is subtle signal abnormality within the posterior   left corona radiata which may represent a subacute infarct.    Age-appropriate involutional changes are noted. A chronic inferior left   cerebellar infarct is noted.    No hydrocephalus, midline shift or extra-axial collections are present.    Major flow voids at the skull base are not remarkable in appearance.    The orbits are within normal limits. 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    The visualized paranasal sinuses and tympanomastoid cavities are not   remarkable in appearance.    Neck MRA.    The examination is significantly limited by swallowing artifact. No   definite high-grade stenosis is able to be identified. Note is made of a   retropharyngeal course of the internal carotid arteries bilaterally, a   normal anatomic variant.    Both vertebral arteries are able to be identified and contribute to a   normal-appearing vertebral basilar confluence. The vertebral arterial   margins cannot be evaluated.    Brain MRA:    There is tapering and loss of signal along the right anterior cerebral   artery are consistent with multifocal multifocal areas of stenosis and   probable distal occlusion. Multifocal areas of narrowing are noted along   the M1 segments bilaterally. The M1 segments are grossly patent. The   vertebral basilar confluence and basilar artery are well visualized.   There is focal signal dropout at the right P1 P2 segment likely   representing a focal stenosis. The right posterior cerebral artery is   otherwise well visualized with a patent right posterior communicating   artery. No vascular aneurysm is identified.    Impression:    Brain MRI: Acute infarct in the distribution of the right anterior   cerebral artery.  Probable subacute infarct in the left corona radiata.    Neck MRA: Significantly limited study however no gross high-grade   stenosis is identified.    Brain MRA: Multifocal narrowing of the right anterior cerebral artery   with poor visualization of the distal segment.  Multifocal M1 segment narrowing likely on the basis of atherosclerotic   disease.      The results of this examination were discussed with Dr. Donohue at 11:30   AM on 7/24/2018                    JUANY GUAMAN M.D., ATTENDING RADIOLOGIST  This document has been electronically signed. Jul 24 2018 11:33AM                < end of copied text >       < from: CT Head No Cont (07.23.18 @ 19:57) >    EXAM:  CT BRAIN                            PROCEDURE DATE:  07/23/2018            INTERPRETATION:  HISTORY: Left upper extremity weakness, evaluate for CVA.    Technique: CT of the head was performed without intravenous contrast.    Multiple contiguous axial images were acquired from the skullbase to the   vertex without the administration of intravenous contrast.  Coronal and   sagittal reformations were made.    COMPARISON: None.    FINDINGS:  There is a small region of hypodensity in the right mesial inferior   frontal lobe, compatible with age-indeterminate infarct.    No acute intracranial hemorrhage, significant mass effect or midline   shift.    The ventricles and sulci are within normal limits for the patient's age   without evidence of hydrocephalus. Small chronic lacunar infarct in the   left corona radiata. Mild patchy periventricular hypodensities,   compatible with chronic microvascular changes.    The calvarium is intact. The visualized intraorbital compartments,   paranasal sinuses and mastoid complexes are free of acute disease.    IMPRESSION:  Small lucency right frontal lobe may be infarct of indeterminate age.   Underlying lesion cannot be completely excluded and MRI follow-up can be   obtained for further evaluation.    No acute intracranial hemorrhage, significant mass effect or midline   shift.    Findings discussed by Dr. Villegas to Dr. Lawson on 7/23/2018 at 8:20 PM.                  TARAN VILLEGAS M.D., RADIOLOGY RESIDENT  This document has been electronically signed.  JUANY GUAMAN M.D., ATTENDING RADIOLOGIST  This document has been electronically signed. Jul 23 2018  8:31PM                < end of copied text >    OTHER: 	  	  LABS:	 	    CARDIAC MARKERS:                                  12.2   10.8  )-----------( 248      ( 23 Jul 2018 18:24 )             37.5     07-23    140  |  103  |  33<H>  ----------------------------<  273<H>  4.1   |  23  |  1.65<H>    Ca    9.6      23 Jul 2018 18:24  Mg     2.2     07-23    TPro  6.4  /  Alb  3.8  /  TBili  0.2  /  DBili  x   /  AST  26  /  ALT  13  /  AlkPhos  67  07-23    proBNP:   Lipid Profile:   HgA1c: Hemoglobin A1C, Whole Blood: 7.4 % (07-24 @ 08:23)    TSH: Thyroid Stimulating Hormone, Serum: 1.10 uIU/mL (07-23 @ 21:16)
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
## 2018-07-25 LAB
-  AMIKACIN: SIGNIFICANT CHANGE UP
-  AMOXICILLIN/CLAVULANIC ACID: SIGNIFICANT CHANGE UP
-  AMPICILLIN/SULBACTAM: SIGNIFICANT CHANGE UP
-  AMPICILLIN: SIGNIFICANT CHANGE UP
-  AZTREONAM: SIGNIFICANT CHANGE UP
-  CEFAZOLIN: SIGNIFICANT CHANGE UP
-  CEFEPIME: SIGNIFICANT CHANGE UP
-  CEFOXITIN: SIGNIFICANT CHANGE UP
-  CEFTRIAXONE: SIGNIFICANT CHANGE UP
-  CIPROFLOXACIN: SIGNIFICANT CHANGE UP
-  ERTAPENEM: SIGNIFICANT CHANGE UP
-  GENTAMICIN: SIGNIFICANT CHANGE UP
-  IMIPENEM: SIGNIFICANT CHANGE UP
-  LEVOFLOXACIN: SIGNIFICANT CHANGE UP
-  MEROPENEM: SIGNIFICANT CHANGE UP
-  NITROFURANTOIN: SIGNIFICANT CHANGE UP
-  PIPERACILLIN/TAZOBACTAM: SIGNIFICANT CHANGE UP
-  TIGECYCLINE: SIGNIFICANT CHANGE UP
-  TOBRAMYCIN: SIGNIFICANT CHANGE UP
-  TRIMETHOPRIM/SULFAMETHOXAZOLE: SIGNIFICANT CHANGE UP
ANION GAP SERPL CALC-SCNC: 15 MMOL/L — SIGNIFICANT CHANGE UP (ref 5–17)
BUN SERPL-MCNC: 19 MG/DL — SIGNIFICANT CHANGE UP (ref 7–23)
CALCIUM SERPL-MCNC: 9 MG/DL — SIGNIFICANT CHANGE UP (ref 8.4–10.5)
CHLORIDE SERPL-SCNC: 100 MMOL/L — SIGNIFICANT CHANGE UP (ref 96–108)
CO2 SERPL-SCNC: 25 MMOL/L — SIGNIFICANT CHANGE UP (ref 22–31)
CREAT SERPL-MCNC: 1.18 MG/DL — SIGNIFICANT CHANGE UP (ref 0.5–1.3)
CULTURE RESULTS: SIGNIFICANT CHANGE UP
GLUCOSE SERPL-MCNC: 162 MG/DL — HIGH (ref 70–99)
HCT VFR BLD CALC: 40.8 % — SIGNIFICANT CHANGE UP (ref 34.5–45)
HGB BLD-MCNC: 13.1 G/DL — SIGNIFICANT CHANGE UP (ref 11.5–15.5)
MCHC RBC-ENTMCNC: 26.1 PG — LOW (ref 27–34)
MCHC RBC-ENTMCNC: 32.1 GM/DL — SIGNIFICANT CHANGE UP (ref 32–36)
MCV RBC AUTO: 81.5 FL — SIGNIFICANT CHANGE UP (ref 80–100)
METHOD TYPE: SIGNIFICANT CHANGE UP
ORGANISM # SPEC MICROSCOPIC CNT: SIGNIFICANT CHANGE UP
ORGANISM # SPEC MICROSCOPIC CNT: SIGNIFICANT CHANGE UP
PLATELET # BLD AUTO: 238 K/UL — SIGNIFICANT CHANGE UP (ref 150–400)
POTASSIUM SERPL-MCNC: 4 MMOL/L — SIGNIFICANT CHANGE UP (ref 3.5–5.3)
POTASSIUM SERPL-SCNC: 4 MMOL/L — SIGNIFICANT CHANGE UP (ref 3.5–5.3)
RBC # BLD: 5 M/UL — SIGNIFICANT CHANGE UP (ref 3.8–5.2)
RBC # FLD: 13.2 % — SIGNIFICANT CHANGE UP (ref 10.3–14.5)
SODIUM SERPL-SCNC: 140 MMOL/L — SIGNIFICANT CHANGE UP (ref 135–145)
SPECIMEN SOURCE: SIGNIFICANT CHANGE UP
WBC # BLD: 14.9 K/UL — HIGH (ref 3.8–10.5)
WBC # FLD AUTO: 14.9 K/UL — HIGH (ref 3.8–10.5)

## 2018-07-25 PROCEDURE — 99222 1ST HOSP IP/OBS MODERATE 55: CPT | Mod: GC

## 2018-07-25 RX ORDER — AMLODIPINE BESYLATE 2.5 MG/1
5 TABLET ORAL DAILY
Qty: 0 | Refills: 0 | Status: DISCONTINUED | OUTPATIENT
Start: 2018-07-25 | End: 2018-07-26

## 2018-07-25 RX ORDER — HYDRALAZINE HCL 50 MG
5 TABLET ORAL ONCE
Qty: 0 | Refills: 0 | Status: COMPLETED | OUTPATIENT
Start: 2018-07-25 | End: 2018-07-25

## 2018-07-25 RX ORDER — APIXABAN 2.5 MG/1
2.5 TABLET, FILM COATED ORAL EVERY 12 HOURS
Qty: 0 | Refills: 0 | Status: DISCONTINUED | OUTPATIENT
Start: 2018-07-25 | End: 2018-07-30

## 2018-07-25 RX ADMIN — Medication 650 MILLIGRAM(S): at 06:49

## 2018-07-25 RX ADMIN — APIXABAN 2.5 MILLIGRAM(S): 2.5 TABLET, FILM COATED ORAL at 17:52

## 2018-07-25 RX ADMIN — Medication 5 MILLIGRAM(S): at 23:30

## 2018-07-25 RX ADMIN — ATORVASTATIN CALCIUM 80 MILLIGRAM(S): 80 TABLET, FILM COATED ORAL at 21:20

## 2018-07-25 RX ADMIN — Medication 3: at 17:52

## 2018-07-25 RX ADMIN — AMLODIPINE BESYLATE 5 MILLIGRAM(S): 2.5 TABLET ORAL at 12:54

## 2018-07-25 RX ADMIN — HEPARIN SODIUM 5000 UNIT(S): 5000 INJECTION INTRAVENOUS; SUBCUTANEOUS at 06:43

## 2018-07-25 RX ADMIN — Medication 650 MILLIGRAM(S): at 07:20

## 2018-07-25 RX ADMIN — Medication 650 MILLIGRAM(S): at 00:27

## 2018-07-25 RX ADMIN — Medication 650 MILLIGRAM(S): at 01:00

## 2018-07-25 RX ADMIN — Medication 650 MILLIGRAM(S): at 23:31

## 2018-07-25 NOTE — OCCUPATIONAL THERAPY INITIAL EVALUATION ADULT - MD ORDER
OT Evaluation  Out of Bed with Assistance OT Evaluation  Out of Bed with Assistance  No Weightbearing restrictions per Neuro Stroke Team

## 2018-07-25 NOTE — OCCUPATIONAL THERAPY INITIAL EVALUATION ADULT - PLANNED THERAPY INTERVENTIONS, OT EVAL
bed mobility training/fine motor coordination training/strengthening/transfer training/ADL retraining/balance training

## 2018-07-25 NOTE — PROGRESS NOTE ADULT - SUBJECTIVE AND OBJECTIVE BOX
Subjective: Patient seen and examined. No new events except as noted.   resting comfortably in bed     REVIEW OF SYSTEMS:    CONSTITUTIONAL: + weakness, fevers or chills  EYES/ENT: No visual changes;  No vertigo or throat pain   NECK: No pain or stiffness  RESPIRATORY: No cough, wheezing, hemoptysis; No shortness of breath  CARDIOVASCULAR: No chest pain or palpitations  GASTROINTESTINAL: No abdominal or epigastric pain. No nausea, vomiting, or hematemesis; No diarrhea or constipation. No melena or hematochezia.  GENITOURINARY: No dysuria, frequency or hematuria  NEUROLOGICAL: + numbness or weakness  SKIN: No itching, burning, rashes, or lesions   All other review of systems is negative unless indicated above.    MEDICATIONS:  MEDICATIONS  (STANDING):  amLODIPine   Tablet 5 milliGRAM(s) Oral daily  aspirin enteric coated 81 milliGRAM(s) Oral daily  atorvastatin 80 milliGRAM(s) Oral at bedtime  dextrose 5%. 1000 milliLiter(s) (50 mL/Hr) IV Continuous <Continuous>  dextrose 50% Injectable 12.5 Gram(s) IV Push once  dextrose 50% Injectable 25 Gram(s) IV Push once  dextrose 50% Injectable 25 Gram(s) IV Push once  heparin  Injectable 5000 Unit(s) SubCutaneous every 12 hours  insulin lispro (HumaLOG) corrective regimen sliding scale   SubCutaneous three times a day before meals      PHYSICAL EXAM:  T(C): 36.5 (07-25-18 @ 08:50), Max: 36.8 (07-24-18 @ 23:50)  HR: 71 (07-25-18 @ 08:50) (57 - 71)  BP: 180/89 (07-25-18 @ 08:50) (180/89 - 216/75)  RR: 18 (07-25-18 @ 08:50) (18 - 18)  SpO2: 97% (07-25-18 @ 08:50) (97% - 100%)  Wt(kg): --  I&O's Summary    24 Jul 2018 07:01  -  25 Jul 2018 07:00  --------------------------------------------------------  IN: 120 mL / OUT: 200 mL / NET: -80 mL          Appearance: NAD	  HEENT:   Normal oral mucosa, PERRL, EOMI	  Lymphatic: No lymphadenopathy , no edema  Cardiovascular: irregular  S1 S2, No JVD, No murmurs , Peripheral pulses palpable 2+ bilaterally  Respiratory: Lungs clear to auscultation, normal effort 	  Gastrointestinal:  Soft, Non-tender, + BS	  Skin: No rashes, No ecchymoses, No cyanosis, warm to touch  Musculoskeletal: decreased  range of motion and strength  Psychiatry:  lethargic   Ext: No edema  NEUROLOGICAL EXAM:  Mental status: Awake, alert, no aphasia, no neglect   Cranial Nerves: No facial asymmetry, no nystagmus, no dysarthria,  tongue midline  Motor exam: Normal tone, no drift, 5/5 RUE, 5/5 RLE, left hemiparesis: LUE 4/5 LLE 4/5  Sensation: Intact to light touch   Coordination/ Gait: No dysmetria         LABS:    CARDIAC MARKERS:                                12.2   10.8  )-----------( 248      ( 23 Jul 2018 18:24 )             37.5     07-23    140  |  103  |  33<H>  ----------------------------<  273<H>  4.1   |  23  |  1.65<H>    Ca    9.6      23 Jul 2018 18:24  Mg     2.2     07-23    TPro  6.4  /  Alb  3.8  /  TBili  0.2  /  DBili  x   /  AST  26  /  ALT  13  /  AlkPhos  67  07-23    proBNP:   Lipid Profile:   HgA1c:   TSH:     Negative          TELEMETRY: 	 AF   ECG:  	  RADIOLOGY:   DIAGNOSTIC TESTING:  [ ] Echocardiogram:  [ ]  Catheterization:  [ ] Stress Test:    OTHER:

## 2018-07-25 NOTE — PROGRESS NOTE ADULT - ASSESSMENT
ASSESSMENT: 89 y/o Right handed Serbian female with past medical history of DM2, HTN, HLD, Hypothyroidism, Asthma presented with left arm weakness and difficulty walking.   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 ? history of atrial fibrillation,  Incidental subacute left corona radiata infarct.     NEURO: neurologically without acute change, continue close monitoring for neurologic deterioration, permissive HTN iwth slow titration to normotension, titrate statin to LDL goal less than 70, MR imaging as noted above. Physical therapy/OT eval pending.     ANTITHROMBOTIC THERAPY: ASA     PULMONARY: CXR: ? right shoulder fx- obtain dedicated films, small b/l pleural effusions, protecting airway, saturating well     CARDIOVASCULAR: check TTE, cardiac monitoring                              SBP goal: slow titration to normotension     GASTROINTESTINAL:  dysphagia screen  passed, tolerating diet      Diet: puree     RENAL: BUN/Cr elevated, CKD stage 3b , good urine output, maintain adequate hydration      Na Goal: Greater than 135     Robles: n     HEMATOLOGY: no active bleeding noted      DVT ppx: Heparin s.c [x] LMWH []     ID: afebrile, mild leukocytosis, UA: 10-25 RBC, few bacteria, small blood, trace LE, trace glucose- ucx [id number] ecoli, GBS will continue to follow     OTHER:     DISPOSITION: Rehab or home depending on PT eval once stable and workup is complete      CORE MEASURES:        Admission NIHSS: 3      TPA: [x] YES [] NO      LDL/HDL: 171/55     Depression Screen:  p     Statin Therapy: y      Dysphagia Screen: [x] PASS [] FAIL     Smoking [] YES [x] NO      Afib [x?] YES [] NO     Stroke Education [x] YES [] NO ASSESSMENT: 89 y/o Right handed [place] female with past medical history of DM2, HTN, HLD, Hypothyroidism, Asthma, ? a fib presented with left arm weakness and difficulty walking.   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  Incidental subacute left corona radiata infarct.     NEURO: neurologically without acute change, permissive HTN iwth slow titration to normotension, titrate statin to LDL goal less than 70, MR imaging as noted above. Physical therapy/OT eval pending.     ANTITHROMBOTIC THERAPY: Apixaban 2.5 mg bid (age > 80 cr >1.5)    PULMONARY: CXR: ? right shoulder fx- obtain dedicated films, small b/l pleural effusions, protecting airway, saturating well     CARDIOVASCULAR: check TTE, cardiac monitoring a fib, ensure rate control, Dr. Rajan consult appreciated.                               SBP goal: slow titration to normotension     GASTROINTESTINAL:  dysphagia screen  passed, tolerating diet, advance as tolerated       Diet: puree     RENAL: BUN/Cr elevated, CKD stage 3b , good urine output, maintain adequate hydration      Na Goal: Greater than 135     Robles: n     HEMATOLOGY: no active bleeding noted      DVT ppx: Heparin s.c [x] LMWH []     ID: afebrile, mild leukocytosis, UA: 10-25 RBC, few bacteria, small blood, trace LE, trace glucose- ucx [id number] ecoli, GBS will continue to follow - states some frequency but denies dysuria, hematuria, back or abdominal pain.     OTHER: current medical condition and plan of care d/w patient at bedside, all questions answered and concerns addressed. B/l shoulder x ray given pain and concern for r shoulder fx on CXR.      DISPOSITION: Rehab or home depending on PT eval once stable and workup is complete      CORE MEASURES:        Admission NIHSS: 3      TPA: [x] YES [] NO      LDL/HDL: 171/55     Depression Screen:  0     Statin Therapy: y      Dysphagia Screen: [x] PASS [] FAIL     Smoking [] YES [x] NO      Afib [x?] YES [] NO     Stroke Education [x] YES [] NO

## 2018-07-25 NOTE — PROGRESS NOTE ADULT - SUBJECTIVE AND OBJECTIVE BOX
THE PATIENT WAS SEEN AND EXAMINED BY ME WITH THE HOUSESTAFF AND STROKE TEAM DURING MORNING ROUNDS.   HPI:  89 y/o Right handed Gabonese female with past medical history of DM2, HTN, HLD, Hypothyroidism, Asthma presented with left arm weakness and difficulty walking. Patient stated she was in her usual state of health until  evening prior to admission when she tried to bend down to pick something up off the floor, was unable to stand up, and subsequently fell to her knees. Patient then required help to stand up and went to bed. On 7/23/2018 afternoon, around 1 pm, patient was noted by family to have her left arm handing limp while sitting down. Patient then attempted to stand but was unable to and required assistance. At that time, patient denied any accompanying changes in vision, changes in speech, numbness, or tingling, dizziness, or confusion.  At baseline, patient ambulates with a cane.  Patient states she takes Aspirin 81mg daily and Lovastatin but is unsure of her other medications.      SUBJECTIVE: No events overnight.  No new neurologic complaints.      acetaminophen   Tablet. 650 milliGRAM(s) Oral every 6 hours PRN  aspirin enteric coated 81 milliGRAM(s) Oral daily  atorvastatin 80 milliGRAM(s) Oral at bedtime  dextrose 40% Gel 15 Gram(s) Oral once PRN  dextrose 5%. 1000 milliLiter(s) IV Continuous <Continuous>  dextrose 50% Injectable 12.5 Gram(s) IV Push once  dextrose 50% Injectable 25 Gram(s) IV Push once  dextrose 50% Injectable 25 Gram(s) IV Push once  glucagon  Injectable 1 milliGRAM(s) IntraMuscular once PRN  heparin  Injectable 5000 Unit(s) SubCutaneous every 12 hours  insulin lispro (HumaLOG) corrective regimen sliding scale   SubCutaneous three times a day before meals      PHYSICAL EXAM:   Vital Signs Last 24 Hrs  T(C): 36.4 (25 Jul 2018 04:35), Max: 36.8 (24 Jul 2018 23:50)  T(F): 97.5 (25 Jul 2018 04:35), Max: 98.3 (24 Jul 2018 23:50)  HR: 63 (25 Jul 2018 04:35) (57 - 66)  BP: 197/82 (25 Jul 2018 04:35) (194/78 - 216/75)  BP(mean): --  RR: 18 (25 Jul 2018 04:35) (18 - 18)  SpO2: 97% (25 Jul 2018 04:35) (97% - 100%)    General: No acute distress  HEENT: EOM intact, visual fields full  Abdomen: Soft, nontender, nondistended   Extremities: No edema    NEUROLOGICAL EXAM:  Mental status: Awake, alert, no aphasia, no neglect   Cranial Nerves: No facial asymmetry, no nystagmus, no dysarthria,  tongue midline  Motor exam: Normal tone, no drift, 5/5 RUE, 5/5 RLE, left hemiparesis: LUE 4/5 LLE 4/5  Sensation: Intact to light touch   Coordination/ Gait: No dysmetria     LABS:                        12.2   10.8  )-----------( 248      ( 23 Jul 2018 18:24 )             37.5    07-23    140  |  103  |  33<H>  ----------------------------<  273<H>  4.1   |  23  |  1.65<H>    Ca    9.6      23 Jul 2018 18:24  Mg     2.2     07-23    TPro  6.4  /  Alb  3.8  /  TBili  0.2  /  DBili  x   /  AST  26  /  ALT  13  /  AlkPhos  67  07-23  PT/INR - ( 23 Jul 2018 18:24 )   PT: 10.5 sec;   INR: 0.97 ratio         PTT - ( 23 Jul 2018 18:24 )  PTT:28.2 sec  Hemoglobin A1C, Whole Blood: 7.4 % (07-24 @ 08:23)      IMAGING: Reviewed by me.     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  Probable subacute infarct in the left corona radiata.  Neck MRA: Significantly limited study however no gross high-grade   stenosis is identified.  Brain MRA: Multifocal narrowing of the right anterior cerebral artery   with poor visualization of the distal segment.  Multifocal M1 segment narrowing likely on the basis of atherosclerotic   disease. THE PATIENT WAS SEEN AND EXAMINED BY ME WITH THE HOUSESTAFF AND STROKE TEAM DURING MORNING ROUNDS.   HPI:  89 y/o Right handed [place]inean female with past medical history of DM2, HTN, HLD, Hypothyroidism, Asthma presented with left arm weakness and difficulty walking. Patient stated she was in her usual state of health until  evening prior to admission when she tried to bend down to pick something up off the floor, was unable to stand up, and subsequently fell to her knees. Patient then required help to stand up and went to bed. On 7/23/2018 afternoon, around 1 pm, patient was noted by family to have her left arm handing limp while sitting down. Patient then attempted to stand but was unable to and required assistance. At that time, patient denied any accompanying changes in vision, changes in speech, numbness, or tingling, dizziness, or confusion.  At baseline, patient ambulates with a cane.  Patient states she takes Aspirin 81mg daily and Lovastatin but is unsure of her other medications.      SUBJECTIVE: Noted PAF on tele this am.     acetaminophen   Tablet. 650 milliGRAM(s) Oral every 6 hours PRN  aspirin enteric coated 81 milliGRAM(s) Oral daily  atorvastatin 80 milliGRAM(s) Oral at bedtime  dextrose 40% Gel 15 Gram(s) Oral once PRN  dextrose 5%. 1000 milliLiter(s) IV Continuous <Continuous>  dextrose 50% Injectable 12.5 Gram(s) IV Push once  dextrose 50% Injectable 25 Gram(s) IV Push once  dextrose 50% Injectable 25 Gram(s) IV Push once  glucagon  Injectable 1 milliGRAM(s) IntraMuscular once PRN  heparin  Injectable 5000 Unit(s) SubCutaneous every 12 hours  insulin lispro (HumaLOG) corrective regimen sliding scale   SubCutaneous three times a day before meals      PHYSICAL EXAM:   Vital Signs Last 24 Hrs  T(C): 36.4 (25 Jul 2018 04:35), Max: 36.8 (24 Jul 2018 23:50)  T(F): 97.5 (25 Jul 2018 04:35), Max: 98.3 (24 Jul 2018 23:50)  HR: 63 (25 Jul 2018 04:35) (57 - 66)  BP: 197/82 (25 Jul 2018 04:35) (194/78 - 216/75)  BP(mean): --  RR: 18 (25 Jul 2018 04:35) (18 - 18)  SpO2: 97% (25 Jul 2018 04:35) (97% - 100%)    General: No acute distress  HEENT: EOM intact, visual fields full  Abdomen: Soft, nontender, nondistended   Extremities: No edema    NEUROLOGICAL EXAM:  Mental status: Awake, alert, no aphasia, no neglect, oriented to person, place, time, follows commands   Cranial Nerves: No facial asymmetry, no nystagmus, no dysarthria,  tongue midline  Motor exam: Normal tone, no drift, 5/5 RUE, 5/5 RLE, left hemiparesis: LUE 4/5 LLE 3/5  Sensation: Intact to light touch   Coordination/ Gait: No dysmetria     LABS:                        12.2   10.8  )-----------( 248      ( 23 Jul 2018 18:24 )             37.5    07-23    140  |  103  |  33<H>  ----------------------------<  273<H>  4.1   |  23  |  1.65<H>    Ca    9.6      23 Jul 2018 18:24  Mg     2.2     07-23    TPro  6.4  /  Alb  3.8  /  TBili  0.2  /  DBili  x   /  AST  26  /  ALT  13  /  AlkPhos  67  07-23  PT/INR - ( 23 Jul 2018 18:24 )   PT: 10.5 sec;   INR: 0.97 ratio         PTT - ( 23 Jul 2018 18:24 )  PTT:28.2 sec  Hemoglobin A1C, Whole Blood: 7.4 % (07-24 @ 08:23)      IMAGING: Reviewed by me.     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  Probable subacute infarct in the left corona radiata.  Neck MRA: Significantly limited study however no gross high-grade   stenosis is identified.  Brain MRA: Multifocal narrowing of the right anterior cerebral artery   with poor visualization of the distal segment.  Multifocal M1 segment narrowing likely on the basis of atherosclerotic   disease.

## 2018-07-25 NOTE — OCCUPATIONAL THERAPY INITIAL EVALUATION ADULT - FINE MOTOR COORDINATION TRAINING, OT EVAL
GOAL: Pt will improve finger to thumb opposition 5/5 digits on B hand in 2/4 OT sessions within 4 weeks

## 2018-07-25 NOTE — OCCUPATIONAL THERAPY INITIAL EVALUATION ADULT - ADDITIONAL COMMENTS
On 7/23/2018 afternoon, around 1 pm, patient was noted by family to have her L arm handing limp while sitting down. Patient then attempted to stand but was unable to and required assistance.  At baseline, patient ambulates with a cane.  Brain MRI: Acute infarct in the distribution of the right anterior cerebral artery. Probable subacute infarct in the left corona radiata. Neck MRA: Significantly limited study however no gross high-grade stenosis is identified. Brain MRA: Multifocal narrowing of the right anterior cerebral artery with poor visualization of the distal segment. Multifocal M1 segment narrowing likely on the basis of atherosclerotic disease. Xray Knee (-)

## 2018-07-25 NOTE — OCCUPATIONAL THERAPY INITIAL EVALUATION ADULT - PERTINENT HX OF CURRENT PROBLEM, REHAB EVAL
89 y/o Right handed Ni[place]an female with pmh of DM2, HTN, HLD, Hypothyroidism, Asthma p/w LUE weakness and difficulty walking. Pt states she was in her usual state of health until yesterday evening when she tried to bend down to pick something up off the floor, was unable to stand up, and subsequently fell to her knees.

## 2018-07-25 NOTE — OCCUPATIONAL THERAPY INITIAL EVALUATION ADULT - DIAGNOSIS, OT EVAL
Pt demonstrated decreased balance, strength, ROM impacting pt's ability to participate in functional mobility and ADLs.

## 2018-07-26 ENCOUNTER — TRANSCRIPTION ENCOUNTER (OUTPATIENT)
Age: 83
End: 2018-07-26

## 2018-07-26 LAB
ANION GAP SERPL CALC-SCNC: 13 MMOL/L — SIGNIFICANT CHANGE UP (ref 5–17)
BASOPHILS # BLD AUTO: 0.03 K/UL — SIGNIFICANT CHANGE UP (ref 0–0.2)
BASOPHILS NFR BLD AUTO: 0.2 % — SIGNIFICANT CHANGE UP (ref 0–2)
BUN SERPL-MCNC: 20 MG/DL — SIGNIFICANT CHANGE UP (ref 7–23)
CALCIUM SERPL-MCNC: 8.7 MG/DL — SIGNIFICANT CHANGE UP (ref 8.4–10.5)
CHLORIDE SERPL-SCNC: 100 MMOL/L — SIGNIFICANT CHANGE UP (ref 96–108)
CO2 SERPL-SCNC: 25 MMOL/L — SIGNIFICANT CHANGE UP (ref 22–31)
CREAT SERPL-MCNC: 1.37 MG/DL — HIGH (ref 0.5–1.3)
EOSINOPHIL # BLD AUTO: 0.15 K/UL — SIGNIFICANT CHANGE UP (ref 0–0.5)
EOSINOPHIL NFR BLD AUTO: 1.1 % — SIGNIFICANT CHANGE UP (ref 0–6)
GLUCOSE SERPL-MCNC: 181 MG/DL — HIGH (ref 70–99)
HCT VFR BLD CALC: 36.8 % — SIGNIFICANT CHANGE UP (ref 34.5–45)
HGB BLD-MCNC: 11.7 G/DL — SIGNIFICANT CHANGE UP (ref 11.5–15.5)
IMM GRANULOCYTES NFR BLD AUTO: 0.2 % — SIGNIFICANT CHANGE UP (ref 0–1.5)
LYMPHOCYTES # BLD AUTO: 0.87 K/UL — LOW (ref 1–3.3)
LYMPHOCYTES # BLD AUTO: 6.6 % — LOW (ref 13–44)
MCHC RBC-ENTMCNC: 25.3 PG — LOW (ref 27–34)
MCHC RBC-ENTMCNC: 31.8 GM/DL — LOW (ref 32–36)
MCV RBC AUTO: 79.5 FL — LOW (ref 80–100)
MONOCYTES # BLD AUTO: 1.24 K/UL — HIGH (ref 0–0.9)
MONOCYTES NFR BLD AUTO: 9.5 % — SIGNIFICANT CHANGE UP (ref 2–14)
NEUTROPHILS # BLD AUTO: 10.79 K/UL — HIGH (ref 1.8–7.4)
NEUTROPHILS NFR BLD AUTO: 82.4 % — HIGH (ref 43–77)
PLATELET # BLD AUTO: 226 K/UL — SIGNIFICANT CHANGE UP (ref 150–400)
POTASSIUM SERPL-MCNC: 3.8 MMOL/L — SIGNIFICANT CHANGE UP (ref 3.5–5.3)
POTASSIUM SERPL-SCNC: 3.8 MMOL/L — SIGNIFICANT CHANGE UP (ref 3.5–5.3)
RBC # BLD: 4.63 M/UL — SIGNIFICANT CHANGE UP (ref 3.8–5.2)
RBC # FLD: 14.9 % — HIGH (ref 10.3–14.5)
SODIUM SERPL-SCNC: 138 MMOL/L — SIGNIFICANT CHANGE UP (ref 135–145)
WBC # BLD: 13.11 K/UL — HIGH (ref 3.8–10.5)
WBC # FLD AUTO: 13.11 K/UL — HIGH (ref 3.8–10.5)

## 2018-07-26 PROCEDURE — 93306 TTE W/DOPPLER COMPLETE: CPT | Mod: 26

## 2018-07-26 PROCEDURE — 73030 X-RAY EXAM OF SHOULDER: CPT | Mod: 26,50

## 2018-07-26 RX ORDER — AMLODIPINE BESYLATE 2.5 MG/1
5 TABLET ORAL ONCE
Qty: 0 | Refills: 0 | Status: COMPLETED | OUTPATIENT
Start: 2018-07-26 | End: 2018-07-26

## 2018-07-26 RX ORDER — LOVASTATIN 20 MG
0 TABLET ORAL
Qty: 0 | Refills: 0 | COMMUNITY

## 2018-07-26 RX ORDER — NITROFURANTOIN MACROCRYSTAL 50 MG
100 CAPSULE ORAL
Qty: 0 | Refills: 0 | Status: DISCONTINUED | OUTPATIENT
Start: 2018-07-26 | End: 2018-07-26

## 2018-07-26 RX ORDER — ASPIRIN/CALCIUM CARB/MAGNESIUM 324 MG
1 TABLET ORAL
Qty: 0 | Refills: 0 | COMMUNITY

## 2018-07-26 RX ORDER — AMLODIPINE BESYLATE 2.5 MG/1
10 TABLET ORAL DAILY
Qty: 0 | Refills: 0 | Status: DISCONTINUED | OUTPATIENT
Start: 2018-07-26 | End: 2018-07-30

## 2018-07-26 RX ORDER — LOVASTATIN 20 MG
40 TABLET ORAL
Qty: 0 | Refills: 0 | COMMUNITY

## 2018-07-26 RX ORDER — HYDRALAZINE HCL 50 MG
10 TABLET ORAL EVERY 6 HOURS
Qty: 0 | Refills: 0 | Status: DISCONTINUED | OUTPATIENT
Start: 2018-07-26 | End: 2018-07-30

## 2018-07-26 RX ADMIN — AMLODIPINE BESYLATE 5 MILLIGRAM(S): 2.5 TABLET ORAL at 05:56

## 2018-07-26 RX ADMIN — Medication 2: at 18:09

## 2018-07-26 RX ADMIN — ATORVASTATIN CALCIUM 80 MILLIGRAM(S): 80 TABLET, FILM COATED ORAL at 21:46

## 2018-07-26 RX ADMIN — APIXABAN 2.5 MILLIGRAM(S): 2.5 TABLET, FILM COATED ORAL at 05:56

## 2018-07-26 RX ADMIN — Medication 10 MILLIGRAM(S): at 14:58

## 2018-07-26 RX ADMIN — Medication 1: at 10:02

## 2018-07-26 RX ADMIN — Medication 2: at 13:26

## 2018-07-26 RX ADMIN — Medication 10 MILLIGRAM(S): at 21:46

## 2018-07-26 RX ADMIN — APIXABAN 2.5 MILLIGRAM(S): 2.5 TABLET, FILM COATED ORAL at 18:09

## 2018-07-26 RX ADMIN — Medication 650 MILLIGRAM(S): at 00:30

## 2018-07-26 RX ADMIN — Medication 1 TABLET(S): at 18:20

## 2018-07-26 RX ADMIN — AMLODIPINE BESYLATE 5 MILLIGRAM(S): 2.5 TABLET ORAL at 14:58

## 2018-07-26 NOTE — PHYSICAL THERAPY INITIAL EVALUATION ADULT - PLANNED THERAPY INTERVENTIONS, PT EVAL
gait training/bed mobility training/GOAL: Pt will negotiate 3 stairs with 1 HR and step to pattern with min A in 2 weeks./transfer training/balance training

## 2018-07-26 NOTE — DISCHARGE NOTE ADULT - PATIENT PORTAL LINK FT
You can access the [facility], offered by [facility], by registering with the following website: [email or web address]

## 2018-07-26 NOTE — DISCHARGE NOTE ADULT - PLAN OF CARE
Prevent Recurrence Take medications as reconciled.   Follow-up with Primary Care Doctor in 1-2 weeks  Follow-up with Neurologist in 1-2 weeks. resolution of symptoms -c/w secondary stroke prevention w/ risk factor control   -start eliquis BID w/ ASA   -follow up outpatient for long term management   -follow up outpatient cardiology for atrial fibrillation -c/w cipro until August 4th   -supportive care w/ fluids and bedrest  -follow up outpatient PCP for long term management -supportive care w/ pain medications   -PT/OT  -follow up outpatient for long term management

## 2018-07-26 NOTE — PROGRESS NOTE ADULT - SUBJECTIVE AND OBJECTIVE BOX
Subjective: Patient seen and examined. No new events except as noted.   resting comfortably in bed   no cp or sob     REVIEW OF SYSTEMS:    CONSTITUTIONAL: +weakness, fevers or chills  EYES/ENT: No visual changes;  No vertigo or throat pain   NECK: No pain or stiffness  RESPIRATORY: No cough, wheezing, hemoptysis; No shortness of breath  CARDIOVASCULAR: No chest pain or palpitations  GASTROINTESTINAL: No abdominal or epigastric pain. No nausea, vomiting, or hematemesis; No diarrhea or constipation. No melena or hematochezia.  GENITOURINARY: No dysuria, frequency or hematuria  NEUROLOGICAL: + numbness or weakness  SKIN: No itching, burning, rashes, or lesions   All other review of systems is negative unless indicated above.    MEDICATIONS:  MEDICATIONS  (STANDING):  amLODIPine   Tablet 5 milliGRAM(s) Oral daily  apixaban 2.5 milliGRAM(s) Oral every 12 hours  atorvastatin 80 milliGRAM(s) Oral at bedtime  dextrose 5%. 1000 milliLiter(s) (50 mL/Hr) IV Continuous <Continuous>  dextrose 50% Injectable 12.5 Gram(s) IV Push once  dextrose 50% Injectable 25 Gram(s) IV Push once  dextrose 50% Injectable 25 Gram(s) IV Push once  insulin lispro (HumaLOG) corrective regimen sliding scale   SubCutaneous three times a day before meals      PHYSICAL EXAM:  T(C): 37.4 (07-26-18 @ 04:55), Max: 37.6 (07-25-18 @ 23:14)  HR: 77 (07-26-18 @ 04:55) (75 - 82)  BP: 185/65 (07-26-18 @ 04:55) (178/66 - 210/74)  RR: 18 (07-26-18 @ 04:55) (18 - 18)  SpO2: 97% (07-26-18 @ 04:55) (96% - 98%)  Wt(kg): --  I&O's Summary    Height (cm): 165.1 (07-25 @ 16:27)  Weight (kg): 86.9 (07-25 @ 16:27)  BMI (kg/m2): 31.9 (07-25 @ 16:27)  BSA (m2): 1.94 (07-25 @ 16:27)    Appearance: NAD	  HEENT:   Normal oral mucosa, PERRL, EOMI	  Lymphatic: No lymphadenopathy , no edema  Cardiovascular: irregular S1 S2, No JVD, No murmurs , Peripheral pulses palpable 2+ bilaterally  Respiratory: Lungs clear to auscultation, normal effort 	  Gastrointestinal:  Soft, Non-tender, + BS	  Skin: No rashes, No ecchymoses, No cyanosis, warm to touch  Musculoskeletal: decreased  range of motion and strength  Psychiatry:  Mood & affect appropriate  Ext: No edema  NEUROLOGICAL EXAM:  Mental status: Awake, alert, no aphasia, no neglect   Cranial Nerves: No facial asymmetry, no nystagmus, no dysarthria,  tongue midline  Motor exam: Normal tone, no drift, 5/5 RUE, 5/5 RLE, left hemiparesis: LUE 4/5 LLE 4/5  Sensation: Intact to light touch   Coordination/ Gait: No dysmetria       LABS:    CARDIAC MARKERS:                                11.7   13.11 )-----------( 226      ( 26 Jul 2018 08:29 )             36.8     07-26    138  |  100  |  20  ----------------------------<  181<H>  3.8   |  25  |  1.37<H>    Ca    8.7      26 Jul 2018 07:22      proBNP:   Lipid Profile:   HgA1c:   TSH:     Negative          TELEMETRY: AF    ECG:  	  RADIOLOGY:   DIAGNOSTIC TESTING:  [ ] Echocardiogram:  [ ]  Catheterization:  [ ] Stress Test:    OTHER:

## 2018-07-26 NOTE — DISCHARGE NOTE ADULT - NS AS DC STROKE ED MATERIALS
Risk Factors for Stroke/Stroke Warning Signs and Symptoms/Stroke Education Booklet/Prescribed Medications/Need for Followup After Discharge/Call 911 for Stroke

## 2018-07-26 NOTE — PHYSICAL THERAPY INITIAL EVALUATION ADULT - PERTINENT HX OF CURRENT PROBLEM, REHAB EVAL
89 y/o Right handed [place]ian female with past medical history of DM2, HTN, HLD, Hypothyroidism, Asthma presents with left arm weakness and difficulty walking that started yesterday evening into today afternoon (1pm) with gradual improvement in symptoms. Labs WNL. CT head demonstrating small lucency right frontal lobe may be infarct of indeterminate age. 89 y/o Right handed Cym[place] female with past medical history of DM2, HTN, HLD, Hypothyroidism, Asthma presents with left arm weakness and difficulty walking that started yesterday evening into today afternoon (1pm) with gradual improvement in symptoms. Labs WNL. CT head demonstrating small lucency right frontal lobe may be infarct of indeterminate age. MRA head (7/24): acute infarct R ABELARDO, probable subacute infarct L corona radiata 89 y/o Right handed [place] female with past medical history of DM2, HTN, HLD, Hypothyroidism, Asthma presents with left arm weakness and difficulty walking that started yesterday evening into today afternoon (1pm) with gradual improvement in symptoms. Labs WNL. CT head demonstrating small lucency right frontal lobe may be infarct of indeterminate age. MRA head (7/24): acute infarct R ABELARDO, probable subacute infarct L corona radiata. (cont. below):

## 2018-07-26 NOTE — PHYSICAL THERAPY INITIAL EVALUATION ADULT - ACTIVE RANGE OF MOTION EXAMINATION, REHAB EVAL
bilateral  lower extremity Active ROM was WFL (within functional limits)/deficits as listed below/bilateral upper extremity Active ROM was WFL (within functional limits)/except B shoulder flex ~45 deg.

## 2018-07-26 NOTE — PHYSICAL THERAPY INITIAL EVALUATION ADULT - GAIT DEVIATIONS NOTED, PT EVAL
decreased weight-shifting ability/increased time in double stance/decreased [place]/decreased step length

## 2018-07-26 NOTE — PHYSICAL THERAPY INITIAL EVALUATION ADULT - ADDITIONAL COMMENTS
Pt resides with family in a private home with 3 steps to enter.  Pt is semi-dependent of ADLs,  personal care, and uses a cane for ambulation. Pt resides with family in a private home with 3 steps to enter.  Pt is semi-dependent of ADLs, personal care, and uses a cane for ambulation. (cont. from above):  X-ray B shoulder (7/26): chronic appearing avulsion fx R greater tuberosity    SOCIAL HX: Pt resides with family in a private home with 3 steps to enter.  Pt is semi-dependent of ADLs, personal care, and uses a quad cane for ambulation.

## 2018-07-26 NOTE — PHYSICAL THERAPY INITIAL EVALUATION ADULT - MANUAL MUSCLE TESTING RESULTS, REHAB EVAL
grossly assessed due to/B UE at least 3/5 except B shoulder flex 2+/5; B LE at least 3-/5 except B hip flex 2+/5

## 2018-07-26 NOTE — DISCHARGE NOTE ADULT - MEDICATION SUMMARY - MEDICATIONS TO TAKE
I will START or STAY ON the medications listed below when I get home from the hospital:    traMADol 50 mg oral tablet  -- 1 tab(s) by mouth every 8 hours as needed for pain MDD:3 tabs per day  -- Indication: For pain     apixaban 2.5 mg oral tablet  -- 1 tab(s) by mouth every 12 hours  -- Indication: For Anticoagulation     insulin lispro (concentrated) 200 units/mL subcutaneous solution  -- 1 unit(s) subcutaneous 3 times a day    Follow sliding scale  1 Unit(s) if Glucose 151 - 200  2 Unit(s) if Glucose 201 - 250  3 Unit(s) if Glucose 251 - 300  4 Unit(s) if Glucose 301 - 350  5 Unit(s) if Glucose 351 - 400  6 Unit(s) if Glucose Greater Than 400  -- Indication: For DM (diabetes mellitus)    atorvastatin 80 mg oral tablet  -- 1 tab(s) by mouth once a day (at bedtime)  -- Indication: For stroke    Symbicort 160 mcg-4.5 mcg/inh inhalation aerosol  -- 2 puff(s) inhaled 2 times a day  -- Indication: For breathing    amLODIPine 10 mg oral tablet  -- 1 tab(s) by mouth once a day  -- Indication: For HTN (hypertension)    lidocaine 5% topical film  -- Apply on skin to affected area once a day  -- Indication: For pain    montelukast 10 mg oral tablet  -- 1 tab(s) by mouth once a day  -- Indication: For breathing    ciprofloxacin 500 mg oral tablet  -- 1 tab(s) by mouth once a day  -- Indication: For UTI    hydrALAZINE 20 mg/mL injectable solution  -- 10  injectable every 6 hours, As Needed  -- Indication: For HTN (hypertension) I will START or STAY ON the medications listed below when I get home from the hospital:    traMADol 50 mg oral tablet  -- 1 tab(s) by mouth every 8 hours as needed for pain MDD:3 tabs per day  -- Indication: For pain     apixaban 2.5 mg oral tablet  -- 1 tab(s) by mouth every 12 hours  -- Indication: For Anticoagulation     insulin lispro (concentrated) 200 units/mL subcutaneous solution  -- 1 unit(s) subcutaneous 3 times a day    Follow sliding scale  1 Unit(s) if Glucose 151 - 200  2 Unit(s) if Glucose 201 - 250  3 Unit(s) if Glucose 251 - 300  4 Unit(s) if Glucose 301 - 350  5 Unit(s) if Glucose 351 - 400  6 Unit(s) if Glucose Greater Than 400  -- Indication: For DM (diabetes mellitus)    atorvastatin 80 mg oral tablet  -- 1 tab(s) by mouth once a day (at bedtime)  -- Indication: For stroke    Symbicort 160 mcg-4.5 mcg/inh inhalation aerosol  -- 2 puff(s) inhaled 2 times a day  -- Indication: For breathing    amLODIPine 10 mg oral tablet  -- 1 tab(s) by mouth once a day  -- Indication: For HTN (hypertension)    lidocaine 5% topical film  -- Apply on skin to affected area once a day  -- Indication: For pain    montelukast 10 mg oral tablet  -- 1 tab(s) by mouth once a day  -- Indication: For breathing    ciprofloxacin 500 mg oral tablet  -- 1 tab(s) by mouth once a day  -- Indication: For UTI    levothyroxine 112 mcg (0.112 mg) oral tablet  -- 1 tab(s) by mouth once a day  -- Indication: For Hypothyroidism    hydrALAZINE 20 mg/mL injectable solution  -- 10  injectable every 6 hours, As Needed  -- Indication: For HTN (hypertension)

## 2018-07-26 NOTE — PROGRESS NOTE ADULT - SUBJECTIVE AND OBJECTIVE BOX
HPI:  89 year old Right handed Swazi female with past medical history of DM2, HTN, HLD, Hypothyroidism, asthma presented with left arm weakness and difficulty walking. Patient stated she was in her usual state of health until  evening prior to admission when she tried to bend down to pick something up off the floor, was unable to stand up, and subsequently fell to her knees. Patient then required help to stand up and went to bed. On 7/23/2018 afternoon, around 1 pm, patient was noted by family to have her left arm handing limp while sitting down. Patient then attempted to stand but was unable to and required assistance. At that time, patient denied any accompanying changes in vision, changes in speech, numbness, or tingling, dizziness, or confusion.  At baseline, patient ambulates with a cane.  Patient states she takes Aspirin 81mg daily and Lovastatin but is unsure of her other medications.     SUBJECTIVE: No events overnight.  No new neurologic complaints.      acetaminophen   Tablet. 650 milliGRAM(s) Oral every 6 hours PRN  amLODIPine   Tablet 5 milliGRAM(s) Oral daily  apixaban 2.5 milliGRAM(s) Oral every 12 hours  atorvastatin 80 milliGRAM(s) Oral at bedtime  dextrose 40% Gel 15 Gram(s) Oral once PRN  dextrose 5%. 1000 milliLiter(s) IV Continuous <Continuous>  dextrose 50% Injectable 12.5 Gram(s) IV Push once  dextrose 50% Injectable 25 Gram(s) IV Push once  dextrose 50% Injectable 25 Gram(s) IV Push once  glucagon  Injectable 1 milliGRAM(s) IntraMuscular once PRN  insulin lispro (HumaLOG) corrective regimen sliding scale   SubCutaneous three times a day before meals      PHYSICAL EXAM:   Vital Signs Last 24 Hrs  T(C): 37.4 (26 Jul 2018 04:55), Max: 37.6 (25 Jul 2018 23:14)  T(F): 99.4 (26 Jul 2018 04:55), Max: 99.7 (25 Jul 2018 23:14)  HR: 75 (26 Jul 2018 08:13) (75 - 82)  BP: 192/75 (26 Jul 2018 08:13) (178/66 - 210/74)  RR: 18 (26 Jul 2018 08:13) (18 - 18)  SpO2: 100% (26 Jul 2018 08:13) (96% - 100%)    General: No acute distress  HEENT: EOM intact, visual fields full  Abdomen: Soft, nontender, nondistended   Extremities: No edema    NEUROLOGICAL EXAM:  Mental status: Awake, alert, no aphasia, no neglect, oriented to person, place, time, follows commands   Cranial Nerves: No facial asymmetry, no nystagmus, no dysarthria,  tongue midline  Motor exam: Normal tone, no drift, 5/5 RUE, 5/5 RLE, left hemiparesis: LUE 4/5 LLE 3/5  Sensation: Intact to light touch   Coordination/ Gait: No dysmetria     LABS:                        11.7   13.11 )-----------( 226      ( 26 Jul 2018 08:29 )             36.8    07-26    138  |  100  |  20  ----------------------------<  181<H>  3.8   |  25  |  1.37<H>    Ca    8.7      26 Jul 2018 07:22      Hemoglobin A1C, Whole Blood: 7.4 % (07-24 @ 08:23)  Hemoglobin A1C, Whole Blood: 9.1 % (05-16 @ 11:05)      IMAGING: Reviewed by me.     TTE ( 07.26.18)  1. Normal left ventricular internal dimensions and wall  thickness.  2. Normal left ventricular systolic function. No segmental  wall motion abnormalities.  3. Mild diastolic dysfunction (Stage I).  4. Normal right ventricular size and function.  5. Estimated pulmonary artery systolic pressure equals 44  mm Hg, assuming right atrial pressure equals 8 mm Hg,  consistent with mild pulmonary pressures.    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  Probable subacute infarct in the left corona radiata.  Neck MRA: Significantly limited study however no gross high-grade   stenosis is identified.  Brain MRA: Multifocal narrowing of the right anterior cerebral artery   with poor visualization of the distal segment.  Multifocal M1 segment narrowing likely on the basis of atherosclerotic   disease. HPI:  89 year old Right handed Slovak female with past medical history of DM2, HTN, HLD, Hypothyroidism, asthma presented with left arm weakness and difficulty walking. Patient stated she was in her usual state of health until  evening prior to admission when she tried to bend down to pick something up off the floor, was unable to stand up, and subsequently fell to her knees. Patient then required help to stand up and went to bed. On 7/23/2018 afternoon, around 1 pm, patient was noted by family to have her left arm handing limp while sitting down. Patient then attempted to stand but was unable to and required assistance. At that time, patient denied any accompanying changes in vision, changes in speech, numbness, or tingling, dizziness, or confusion.  At baseline, patient ambulates with a cane.  Patient states she takes Aspirin 81mg daily and Lovastatin but is unsure of her other medications.     SUBJECTIVE: No events overnight.  No new neurologic complaints.      acetaminophen   Tablet. 650 milliGRAM(s) Oral every 6 hours PRN  amLODIPine   Tablet 5 milliGRAM(s) Oral daily  apixaban 2.5 milliGRAM(s) Oral every 12 hours  atorvastatin 80 milliGRAM(s) Oral at bedtime  dextrose 40% Gel 15 Gram(s) Oral once PRN  dextrose 5%. 1000 milliLiter(s) IV Continuous <Continuous>  dextrose 50% Injectable 12.5 Gram(s) IV Push once  dextrose 50% Injectable 25 Gram(s) IV Push once  dextrose 50% Injectable 25 Gram(s) IV Push once  glucagon  Injectable 1 milliGRAM(s) IntraMuscular once PRN  insulin lispro (HumaLOG) corrective regimen sliding scale   SubCutaneous three times a day before meals      PHYSICAL EXAM:   Vital Signs Last 24 Hrs  T(C): 37.4 (26 Jul 2018 04:55), Max: 37.6 (25 Jul 2018 23:14)  T(F): 99.4 (26 Jul 2018 04:55), Max: 99.7 (25 Jul 2018 23:14)  HR: 75 (26 Jul 2018 08:13) (75 - 82)  BP: 192/75 (26 Jul 2018 08:13) (178/66 - 210/74)  RR: 18 (26 Jul 2018 08:13) (18 - 18)  SpO2: 100% (26 Jul 2018 08:13) (96% - 100%)    General: No acute distress  HEENT: EOM intact, visual fields full  Abdomen: Soft, nontender, nondistended   Extremities: No edema    NEUROLOGICAL EXAM:  Mental status: Awake, alert, no neglect, oriented to person, place, time, follows commands   Cranial Nerves: No facial asymmetry, no nystagmus, no dysarthria,  tongue midline  Motor exam: Normal tone, no drift, 5/5 RUE, 5/5 RLE, left hemiparesis: LUE 4/5 LLE 3/5  Sensation: Intact to light touch   Coordination/ Gait: No dysmetria     LABS:                        11.7   13.11 )-----------( 226      ( 26 Jul 2018 08:29 )             36.8    07-26    138  |  100  |  20  ----------------------------<  181<H>  3.8   |  25  |  1.37<H>    Ca    8.7      26 Jul 2018 07:22      Hemoglobin A1C, Whole Blood: 7.4 % (07-24 @ 08:23)  Hemoglobin A1C, Whole Blood: 9.1 % (05-16 @ 11:05)      IMAGING: Reviewed by me.     TTE ( 07.26.18)  1. Normal left ventricular internal dimensions and wall  thickness.  2. Normal left ventricular systolic function. No segmental  wall motion abnormalities.  3. Mild diastolic dysfunction (Stage I).  4. Normal right ventricular size and function.  5. Estimated pulmonary artery systolic pressure equals 44  mm Hg, assuming right atrial pressure equals 8 mm Hg,  consistent with mild pulmonary pressures.    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  Probable subacute infarct in the left corona radiata.  Neck MRA: Significantly limited study however no gross high-grade   stenosis is identified.  Brain MRA: Multifocal narrowing of the right anterior cerebral artery   with poor visualization of the distal segment.  Multifocal M1 segment narrowing likely on the basis of atherosclerotic   disease.

## 2018-07-26 NOTE — DISCHARGE NOTE ADULT - CARE PROVIDER_API CALL
Norbert Oliveros (DO), Neurology; Vascular Neurology  [address] [facility] [address]  [place] [address]  Phone: [phone]  Fax: [phone]

## 2018-07-26 NOTE — PROGRESS NOTE ADULT - ASSESSMENT
89 y/o Right handed Irish female with past medical history of DM2, HTN, HLD, Hypothyroidism, Asthma, ? a fib presented with left arm weakness and difficulty walking.   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  Incidental subacute left corona radiata infarct.     NEURO: neurologically without acute change, permissive HTN iwth slow titration to normotension, titrate statin to LDL goal less than 70, MR imaging as noted above. Physical therapy/OT eval pending.  PMR recommends Acute Rehab    ANTITHROMBOTIC THERAPY: Apixaban 2.5 mg bid (age > 80 cr >1.5)    PULMONARY: CXR: ? right shoulder fx- obtain dedicated films, small b/l pleural effusions, protecting airway, saturating well     CARDIOVASCULAR: TTE results noted above, cardiac monitoring a fib, ensure rate control, Dr. Rajan consult appreciated.                               SBP goal: slow titration to normotension     GASTROINTESTINAL:  dysphagia screen  passed, tolerating diet, advance as tolerated       Diet: puree     RENAL: BUN/Cr elevated, CKD stage 3b , good urine output, maintain adequate hydration      Na Goal: Greater than 135     Robles: n     HEMATOLOGY: H/H without acute change, Platelets 226. no active bleeding noted      DVT ppx: Apixaban 2.5 mg BID    ID: afebrile, mild leukocytosis, UA: 10-25 RBC, few bacteria, small blood, trace LE, trace glucose- ucx [id number] ecoli, GBS will continue to follow - states some frequency but denies dysuria, hematuria, back or abdominal pain.     OTHER: current medical condition and plan of care d/w patient at bedside, all questions answered and concerns addressed. B/l shoulder x ray completed today, results are pending, for concern for r shoulder fx on CXR.      DISPOSITION: Acute Rehab today pending shoulder X-Ray results.       CORE MEASURES:        Admission NIHSS: 3      TPA: [x] YES [] NO      LDL/HDL: 171/55     Depression Screen:  0     Statin Therapy: y      Dysphagia Screen: [x] PASS [] FAIL     Smoking [] YES [x] NO      Afib [x?] YES [] NO     Stroke Education [x] YES [] NO 89 y/o Right handed Ghanaian female with past medical history of DM2, HTN, HLD, Hypothyroidism, Asthma, ? a fib presented with left arm weakness and difficulty walking.   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  Incidental subacute left corona radiata infarct.     NEURO: neurologically without acute change, permissive HTN with slow titration to normotension, Started on Atorvastatin 80mg QHS- titrate statin to LDL goal less than 70, MR imaging as noted above. Physical therapy/OT eval pending.  PMR recommends Acute Rehab    ANTITHROMBOTIC THERAPY: Apixaban 2.5 mg bid (age > 80 cr >1.5) for secondary stroke prevention in setting of Atrial fibrillation    PULMONARY: CXR on admission showed small bilateral pleural effusions. Questionable right shoulder fracture. obtain dedicated films, protecting airway, saturating well. no evidence of CHF.     CARDIOVASCULAR: TTE results noted above, cardiac monitoring a fib, ensure rate control, Dr. Rajan consult appreciated.                               SBP goal: slow titration to normotension     GASTROINTESTINAL: dysphagia screen passed, tolerating diet, advance as tolerated       Diet: puree     RENAL: BUN/Cr previously elevated, CKD stage 3b, good urine output, maintain adequate hydration      Na Goal: Greater than 135     Robles: n     HEMATOLOGY: H/H without acute change, Platelets 226. no active bleeding noted      DVT ppx: Apixaban 2.5 mg BID    ID: afebrile, mild leukocytosis, UA: 10-25 RBC, few bacteria, small blood, trace LE, trace glucose- ucx [id number] ecoli, GBS will continue to follow - states some frequency but denies dysuria, hematuria, back or abdominal pain.     OTHER: current medical condition and plan of care d/w patient at bedside, all questions answered and concerns addressed. B/l shoulder x ray completed, results are pending, for concern for r shoulder fx on CXR.      DISPOSITION: Acute Rehab today pending shoulder X-Ray results.     CORE MEASURES:        Admission NIHSS: 3      TPA: [x] YES [] NO      LDL/HDL: 171/55     Depression Screen:  0     Statin Therapy: y      Dysphagia Screen: [x] PASS [] FAIL     Smoking [] YES [x] NO      Afib [x?] YES [] NO     Stroke Education [x] YES [] NO 89 y/o Right handed Trinidadian female with past medical history of DM2, HTN, HLD, Hypothyroidism, Asthma, ? a fib presented with left arm weakness and difficulty walking.   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  Incidental subacute left corona radiata infarct.     NEURO: neurologically without acute change, permissive HTN with slow titration to normotension, Started on Atorvastatin 80mg QHS- titrate statin to LDL goal less than 70, MR imaging as noted above. Physical therapy/OT eval pending.  PMR recommends Acute Rehab    ANTITHROMBOTIC THERAPY: Apixaban 2.5 mg bid (age > 80 cr >1.5) for secondary stroke prevention in setting of Atrial fibrillation    PULMONARY: CXR on admission showed small bilateral pleural effusions. Questionable right shoulder fracture. obtain dedicated films, protecting airway, saturating well. no evidence of CHF.     CARDIOVASCULAR: TTE results noted above, cardiac monitoring a fib, ensure rate control, Dr. Rajan consult appreciated.                               SBP goal: slow titration to normotension     GASTROINTESTINAL: dysphagia screen passed, tolerating diet, advance as tolerated       Diet: puree     RENAL: BUN/Cr previously elevated, CKD stage 3b, good urine output, maintain adequate hydration      Na Goal: Greater than 135     Robles: n     HEMATOLOGY: H/H without acute change, Platelets 226. no active bleeding noted      DVT ppx: Apixaban 2.5 mg BID    ID: afebrile, mild leukocytosis, UA: 10-25 RBC, few bacteria, small blood, trace LE, trace glucose- ucx [id number] ecoli, GBS will continue to follow - states some frequency but denies dysuria, hematuria, back or abdominal pain. Macrobid started for 5 day course. Encourage outpatient follow up.      OTHER: current medical condition and plan of care d/w patient at bedside, all questions answered and concerns addressed. B/l shoulder x ray completed, results are pending, for concern for r shoulder fx on CXR.      DISPOSITION: Acute Rehab today pending shoulder X-Ray results.     CORE MEASURES:        Admission NIHSS: 3      TPA: [x] YES [] NO      LDL/HDL: 171/55     Depression Screen:  0     Statin Therapy: y      Dysphagia Screen: [x] PASS [] FAIL     Smoking [] YES [x] NO      Afib [x?] YES [] NO     Stroke Education [x] YES [] NO

## 2018-07-26 NOTE — DISCHARGE NOTE ADULT - CARE PLAN
Principal Discharge DX:	CVA (cerebral vascular accident)  Goal:	Prevent Recurrence  Assessment and plan of treatment:	Take medications as reconciled.   Follow-up with Primary Care Doctor in 1-2 weeks  Follow-up with Neurologist in 1-2 weeks. Principal Discharge DX:	CVA (cerebral vascular accident)  Goal:	resolution of symptoms  Assessment and plan of treatment:	-c/w secondary stroke prevention w/ risk factor control   -start eliquis BID w/ ASA   -follow up outpatient for long term management   -follow up outpatient cardiology for atrial fibrillation  Secondary Diagnosis:	UTI (urinary tract infection)  Goal:	resolution of symptoms  Assessment and plan of treatment:	-c/w cipro until August 4th   -supportive care w/ fluids and bedrest  -follow up outpatient PCP for long term management  Secondary Diagnosis:	Shoulder pain  Goal:	resolution of symptoms  Assessment and plan of treatment:	-supportive care w/ pain medications   -PT/OT  -follow up outpatient for long term management

## 2018-07-26 NOTE — DISCHARGE NOTE ADULT - HOSPITAL COURSE
89 y/o Right handed [place] female with past medical history of DM2, HTN, HLD, Hypothyroidism, Asthma presents with left arm weakness and difficulty walking that started yesterday evening into today afternoon (1pm) with gradual improvement in symptoms. Labs WNL. CT head demonstrating small lucency right frontal lobe may be infarct of indeterminate age. Neurologic exam remarkable for LUE weakness and LLE weakness. NIHSS: 3. MRS: 3.   Patient admitted to Stroke service for further management. MRI brain demonstrated acute infarct in the distribution of the R ABELARDO with probable subacute infarct in the left Corona Radiata. MRA Head/Neck demonstrated multifocal narrowing of the right ABELARDO and multifocal M1 narrowing however, no high grade stenosis. TTE pending ***. Bilateral shoulder xrays ordered given initial fall and pending ***. Patient was noted to be in A-fib on telemetry and started on Eliquis 2.5mg BID. PT/OT consulted and recommendations pending ***.   Patient is medically and clinically stable for discharge with outpatient follow-up with Primary Care Doctor and Neurologist. 89 yo right-handed Mosotho woman presented to [facility] w/ left arm weakness and gait instability x 1 day. After extensive workup, which including CTH, MRI H, MRA H/N, TTE, labs, and cardiology consult, patient was diagnosed with right hemiparesis 2/2 right ABELARDO cerebral infarct likely due to cardio embolism in setting of PAF. Patient's condition was managed w/ anticoagulation and ASA, along w/ secondary stroke prevention w/ risk factor control, supportive and PT which recommended acute rehab. Patient also diagnosed w/ UTI (GBS/ecoli) and managed w/ antibiotics and supportive care. Patient also found to have right shoulder pain 2/2 chronic alvusion fracture in setting of chronic OA and managed medically w/ pain medication and supportive care. All other comorbidities were actively monitored and managed w/ both inpatient and/or home meds as indicated and it is recommended that patient follow up outpatient for long term management. Any electrolyte abnormalities were closely monitored and repleted as needed. Patient to be discharged to acute rehab for continued physical therapy. Patient to follow up outpatient neurology for long term management of current neurologic condition. Patient to follow up outpatient cardiology for long term management of cardiac condition. Patient to follow up outpatient PCP for management of all current conditions. Patient's vitals are stable and is cleared for discharge.

## 2018-07-27 LAB
ANION GAP SERPL CALC-SCNC: 13 MMOL/L — SIGNIFICANT CHANGE UP (ref 5–17)
APPEARANCE UR: ABNORMAL
BASOPHILS # BLD AUTO: 0.1 K/UL — SIGNIFICANT CHANGE UP (ref 0–0.2)
BASOPHILS NFR BLD AUTO: 0.8 % — SIGNIFICANT CHANGE UP (ref 0–2)
BILIRUB UR-MCNC: NEGATIVE — SIGNIFICANT CHANGE UP
BUN SERPL-MCNC: 20 MG/DL — SIGNIFICANT CHANGE UP (ref 7–23)
CALCIUM SERPL-MCNC: 8.8 MG/DL — SIGNIFICANT CHANGE UP (ref 8.4–10.5)
CHLORIDE SERPL-SCNC: 98 MMOL/L — SIGNIFICANT CHANGE UP (ref 96–108)
CO2 SERPL-SCNC: 25 MMOL/L — SIGNIFICANT CHANGE UP (ref 22–31)
COLOR SPEC: YELLOW — SIGNIFICANT CHANGE UP
CREAT SERPL-MCNC: 1.37 MG/DL — HIGH (ref 0.5–1.3)
DIFF PNL FLD: ABNORMAL
EOSINOPHIL # BLD AUTO: 0.2 K/UL — SIGNIFICANT CHANGE UP (ref 0–0.5)
EOSINOPHIL NFR BLD AUTO: 1.7 % — SIGNIFICANT CHANGE UP (ref 0–6)
GLUCOSE SERPL-MCNC: 199 MG/DL — HIGH (ref 70–99)
GLUCOSE UR QL: NEGATIVE — SIGNIFICANT CHANGE UP
HCT VFR BLD CALC: 37.1 % — SIGNIFICANT CHANGE UP (ref 34.5–45)
HGB BLD-MCNC: 11.9 G/DL — SIGNIFICANT CHANGE UP (ref 11.5–15.5)
KETONES UR-MCNC: ABNORMAL
LEUKOCYTE ESTERASE UR-ACNC: ABNORMAL
LYMPHOCYTES # BLD AUTO: 1.6 K/UL — SIGNIFICANT CHANGE UP (ref 1–3.3)
LYMPHOCYTES # BLD AUTO: 16 % — SIGNIFICANT CHANGE UP (ref 13–44)
MCHC RBC-ENTMCNC: 26 PG — LOW (ref 27–34)
MCHC RBC-ENTMCNC: 32.1 GM/DL — SIGNIFICANT CHANGE UP (ref 32–36)
MCV RBC AUTO: 81 FL — SIGNIFICANT CHANGE UP (ref 80–100)
MONOCYTES # BLD AUTO: 0.7 K/UL — SIGNIFICANT CHANGE UP (ref 0–0.9)
MONOCYTES NFR BLD AUTO: 7 % — SIGNIFICANT CHANGE UP (ref 2–14)
NEUTROPHILS # BLD AUTO: 7.3 K/UL — SIGNIFICANT CHANGE UP (ref 1.8–7.4)
NEUTROPHILS NFR BLD AUTO: 74.5 % — SIGNIFICANT CHANGE UP (ref 43–77)
NITRITE UR-MCNC: NEGATIVE — SIGNIFICANT CHANGE UP
PH UR: 6.5 — SIGNIFICANT CHANGE UP (ref 5–8)
PLATELET # BLD AUTO: 209 K/UL — SIGNIFICANT CHANGE UP (ref 150–400)
POTASSIUM SERPL-MCNC: 3.7 MMOL/L — SIGNIFICANT CHANGE UP (ref 3.5–5.3)
POTASSIUM SERPL-SCNC: 3.7 MMOL/L — SIGNIFICANT CHANGE UP (ref 3.5–5.3)
PROT UR-MCNC: 300 MG/DL
RBC # BLD: 4.58 M/UL — SIGNIFICANT CHANGE UP (ref 3.8–5.2)
RBC # FLD: 13 % — SIGNIFICANT CHANGE UP (ref 10.3–14.5)
SODIUM SERPL-SCNC: 136 MMOL/L — SIGNIFICANT CHANGE UP (ref 135–145)
SP GR SPEC: 1.01 — SIGNIFICANT CHANGE UP (ref 1.01–1.02)
UROBILINOGEN FLD QL: 1 MG/DL
WBC # BLD: 9.8 K/UL — SIGNIFICANT CHANGE UP (ref 3.8–10.5)
WBC # FLD AUTO: 9.8 K/UL — SIGNIFICANT CHANGE UP (ref 3.8–10.5)

## 2018-07-27 PROCEDURE — 70450 CT HEAD/BRAIN W/O DYE: CPT | Mod: 26

## 2018-07-27 RX ORDER — LIDOCAINE 4 G/100G
1 CREAM TOPICAL DAILY
Qty: 0 | Refills: 0 | Status: DISCONTINUED | OUTPATIENT
Start: 2018-07-27 | End: 2018-07-30

## 2018-07-27 RX ADMIN — Medication 1: at 17:31

## 2018-07-27 RX ADMIN — APIXABAN 2.5 MILLIGRAM(S): 2.5 TABLET, FILM COATED ORAL at 05:44

## 2018-07-27 RX ADMIN — Medication 650 MILLIGRAM(S): at 17:31

## 2018-07-27 RX ADMIN — APIXABAN 2.5 MILLIGRAM(S): 2.5 TABLET, FILM COATED ORAL at 17:30

## 2018-07-27 RX ADMIN — Medication 1 TABLET(S): at 17:30

## 2018-07-27 RX ADMIN — LIDOCAINE 1 PATCH: 4 CREAM TOPICAL at 14:04

## 2018-07-27 RX ADMIN — AMLODIPINE BESYLATE 10 MILLIGRAM(S): 2.5 TABLET ORAL at 05:44

## 2018-07-27 RX ADMIN — Medication 2: at 14:03

## 2018-07-27 RX ADMIN — Medication 1: at 08:45

## 2018-07-27 RX ADMIN — Medication 1 TABLET(S): at 05:44

## 2018-07-27 RX ADMIN — Medication 10 MILLIGRAM(S): at 10:40

## 2018-07-27 RX ADMIN — ATORVASTATIN CALCIUM 80 MILLIGRAM(S): 80 TABLET, FILM COATED ORAL at 21:16

## 2018-07-27 NOTE — PROGRESS NOTE ADULT - ASSESSMENT
89 y/o Right handed Ukrainian female with past medical history of DM2, HTN, HLD, Hypothyroidism, Asthma, ? a fib presented with left arm weakness and difficulty walking.   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  Incidental subacute left corona radiata infarct.     NEURO: neurologically with acute change this morning, patient is somnolent and not following commands. CT Scan ordered. permissive HTN with slow titration to normotension, Started on Atorvastatin 80mg QHS- titrate statin to LDL goal less than 70, MR imaging as noted above. Physical therapy/OT eval pending.  PMR recommends Acute Rehab    ANTITHROMBOTIC THERAPY: Apixaban 2.5 mg bid (age > 80 cr >1.5) for secondary stroke prevention in setting of Atrial fibrillation    PULMONARY: CXR on admission showed small bilateral pleural effusions. Questionable right shoulder fracture. obtain dedicated films, protecting airway, saturating well. no evidence of CHF.     CARDIOVASCULAR: TTE results noted above, cardiac monitoring a fib, ensure rate control, Dr. Rajan consult appreciated.                               SBP goal: slow titration to normotension     GASTROINTESTINAL: dysphagia screen passed, tolerating diet, advance as tolerated       Diet: puree     RENAL: BUN/Cr previously elevated, BMP results pending, CKD stage 3b, good urine output, maintain adequate hydration      Na Goal: Greater than 135     Robles: n     HEMATOLOGY: H/H previously without acute change, Platelets 226. no active bleeding noted. CBC results pending     DVT ppx: Apixaban 2.5 mg BID    ID: afebrile, mild leukocytosis, UA: 10-25 RBC, few bacteria, small blood, trace LE, trace glucose- ucx [id number] ecoli, GBS will continue to follow - states some frequency but denies dysuria, hematuria, back or abdominal pain. Augmentin started for 5 day course. Encourage outpatient follow up.      OTHER:  B/l shoulder x ray on 7/26: Chronic appearing avulsion fracture of the right greater tuberosity, no acute fractures noted and severe bilateral glenohumeral joint osteoarthritis.      DISPOSITION: Acute Rehab pending medical work up.    CORE MEASURES:        Admission NIHSS: 3      TPA: [x] YES [] NO      LDL/HDL: 171/55     Depression Screen:  0     Statin Therapy: y      Dysphagia Screen: [x] PASS [] FAIL     Smoking [] YES [x] NO      Afib [x?] YES [] NO     Stroke Education [x] YES [] NO 89 y/o Right handed Citizen of [place] female with past medical history of DM2, HTN, HLD, Hypothyroidism, Asthma, ? a fib presented with left arm weakness and difficulty walking.   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  Incidental subacute left corona radiata infarct.     NEURO: neurologically overall without acute change, patient was somnolent this AM upon re-evaluation back to baseline. CTH this AM without interval change. Permissive HTN with slow titration to normotension, Started on Atorvastatin 80mg QHS- titrate statin to LDL goal less than 70, MR imaging as noted above. PT/OT/PMR recommends Acute Rehab.    ANTITHROMBOTIC THERAPY: Apixaban 2.5 mg bid (age > 80 cr >1.5) for secondary stroke prevention in setting of Atrial fibrillation    PULMONARY: CXR on admission showed small bilateral pleural effusions. Questionable right shoulder fracture. obtain dedicated films, protecting airway, saturating well. no evidence of CHF.     CARDIOVASCULAR: TTE results noted above, cardiac monitoring a fib, ensure rate control, Dr. Rajan consult appreciated.                               SBP goal: slow titration to normotension     GASTROINTESTINAL: dysphagia screen passed, tolerating diet, advance as tolerated       Diet: puree     RENAL: BUN/Cr previously elevated, BMP results pending, CKD stage 3b, good urine output, maintain adequate hydration      Na Goal: Greater than 135     Robles: n     HEMATOLOGY: H/H previously without acute change, Platelets 209. no active bleeding noted.     DVT ppx: Apixaban 2.5 mg BID    ID: afebrile, leukocytosis resolved, UA: 10-25 RBC, few bacteria, small blood, trace LE, trace glucose- ucx [id number] ecoli, GBS will continue to follow - states some frequency but denies dysuria, hematuria, back or abdominal pain. Augmentin started for 5 day course. Encourage outpatient follow up.      OTHER:  B/l shoulder x ray on 7/26: Chronic appearing avulsion fracture of the right greater tuberosity, no acute fractures noted and severe bilateral glenohumeral joint osteoarthritis. Pain control prn.     DISPOSITION: Acute Rehab.    CORE MEASURES:        Admission NIHSS: 3      TPA: [x] YES [] NO      LDL/HDL: 171/55     Depression Screen:  0     Statin Therapy: y      Dysphagia Screen: [x] PASS [] FAIL     Smoking [] YES [x] NO      Afib [x?] YES [] NO     Stroke Education [x] YES [] NO

## 2018-07-27 NOTE — PROGRESS NOTE ADULT - SUBJECTIVE AND OBJECTIVE BOX
Subjective: Patient seen and examined. No new events except as noted.   resting comfortably in bed   no cp or sob     REVIEW OF SYSTEMS:    CONSTITUTIONAL: + weakness, fevers or chills  EYES/ENT: No visual changes;  No vertigo or throat pain   NECK: No pain or stiffness  RESPIRATORY: No cough, wheezing, hemoptysis; No shortness of breath  CARDIOVASCULAR: No chest pain or palpitations  GASTROINTESTINAL: No abdominal or epigastric pain. No nausea, vomiting, or hematemesis; No diarrhea or constipation. No melena or hematochezia.  GENITOURINARY: No dysuria, frequency or hematuria  NEUROLOGICAL: + numbness or weakness  SKIN: No itching, burning, rashes, or lesions   All other review of systems is negative unless indicated above.    MEDICATIONS:  MEDICATIONS  (STANDING):  amLODIPine   Tablet 10 milliGRAM(s) Oral daily  amoxicillin  500 milliGRAM(s)/clavulanate 1 Tablet(s) Oral two times a day  apixaban 2.5 milliGRAM(s) Oral every 12 hours  atorvastatin 80 milliGRAM(s) Oral at bedtime  dextrose 5%. 1000 milliLiter(s) (50 mL/Hr) IV Continuous <Continuous>  dextrose 50% Injectable 12.5 Gram(s) IV Push once  dextrose 50% Injectable 25 Gram(s) IV Push once  dextrose 50% Injectable 25 Gram(s) IV Push once  insulin lispro (HumaLOG) corrective regimen sliding scale   SubCutaneous three times a day before meals      PHYSICAL EXAM:  T(C): 36.8 (07-27-18 @ 08:07), Max: 37 (07-27-18 @ 05:03)  HR: 82 (07-27-18 @ 10:20) (67 - 84)  BP: 170/70 (07-27-18 @ 10:20) (149/65 - 182/70)  RR: 18 (07-27-18 @ 08:07) (18 - 19)  SpO2: 96% (07-27-18 @ 08:07) (96% - 100%)  Wt(kg): --  I&O's Summary    26 Jul 2018 07:01  -  27 Jul 2018 07:00  --------------------------------------------------------  IN: 660 mL / OUT: 200 mL / NET: 460 mL    27 Jul 2018 07:01  -  27 Jul 2018 10:48  --------------------------------------------------------  IN: 0 mL / OUT: 400 mL / NET: -400 mL          Appearance: NAD	  HEENT:   Normal oral mucosa, PERRL, EOMI	  Lymphatic: No lymphadenopathy , no edema  Cardiovascular: irregular  S1 S2, No JVD, No murmurs , Peripheral pulses palpable 2+ bilaterally  Respiratory: Lungs clear to auscultation, normal effort 	  Gastrointestinal:  Soft, Non-tender, + BS	  Skin: No rashes, No ecchymoses  Musculoskeletal: decreased range of motion and  strength  Psychiatry:  lethargic   Ext: No edema  NEUROLOGICAL EXAM:  Mental status: Awake, but following commands, no aphasia, no neglect, normal memory   Cranial Nerves: No facial asymmetry, no nystagmus, no dysarthria,  tongue midline  Motor exam: Normal tone, poor effort on motor exam  Sensation: Intact to light touch   Coordination/ Gait: gait not assessed      LABS:    CARDIAC MARKERS:                                11.9   9.8   )-----------( 209      ( 27 Jul 2018 09:44 )             37.1     07-27    136  |  98  |  20  ----------------------------<  199<H>  3.7   |  25  |  1.37<H>    Ca    8.8      27 Jul 2018 09:44      proBNP:   Lipid Profile:   HgA1c:   TSH:     Trace          TELEMETRY: 	    ECG:  	  RADIOLOGY:   DIAGNOSTIC TESTING:  [ ] Echocardiogram:  [ ]  Catheterization:  [ ] Stress Test:    OTHER:

## 2018-07-27 NOTE — PROGRESS NOTE ADULT - SUBJECTIVE AND OBJECTIVE BOX
HPI:  89 year old Right handed [place]ian female with past medical history of DM2, HTN, HLD, Hypothyroidism, asthma presented with left arm weakness and difficulty walking. Patient stated she was in her usual state of health until  evening prior to admission when she tried to bend down to pick something up off the floor, was unable to stand up, and subsequently fell to her knees. Patient then required help to stand up and went to bed. On 7/23/2018 afternoon, around 1 pm, patient was noted by family to have her left arm handing limp while sitting down. Patient then attempted to stand but was unable to and required assistance. At that time, patient denied any accompanying changes in vision, changes in speech, numbness, or tingling, dizziness, or confusion.  At baseline, patient ambulates with a cane.  Patient states she takes Aspirin 81mg daily and Lovastatin but is unsure of her other medications.     SUBJECTIVE: Per RN, neurological exam has changed from yesterday. She is more somnolent and not following commands this morning.     acetaminophen   Tablet. 650 milliGRAM(s) Oral every 6 hours PRN  amLODIPine   Tablet 10 milliGRAM(s) Oral daily  amoxicillin  500 milliGRAM(s)/clavulanate 1 Tablet(s) Oral two times a day  apixaban 2.5 milliGRAM(s) Oral every 12 hours  atorvastatin 80 milliGRAM(s) Oral at bedtime  dextrose 40% Gel 15 Gram(s) Oral once PRN  dextrose 5%. 1000 milliLiter(s) IV Continuous <Continuous>  dextrose 50% Injectable 12.5 Gram(s) IV Push once  dextrose 50% Injectable 25 Gram(s) IV Push once  dextrose 50% Injectable 25 Gram(s) IV Push once  glucagon  Injectable 1 milliGRAM(s) IntraMuscular once PRN  hydrALAZINE Injectable 10 milliGRAM(s) IV Push every 6 hours PRN  insulin lispro (HumaLOG) corrective regimen sliding scale   SubCutaneous three times a day before meals      PHYSICAL EXAM:   Vital Signs Last 24 Hrs  T(C): 36.8 (27 Jul 2018 08:07), Max: 37 (27 Jul 2018 05:03)  T(F): 98.3 (27 Jul 2018 08:07), Max: 98.6 (27 Jul 2018 05:03)  HR: 73 (27 Jul 2018 08:07) (67 - 84)  BP: 181/73 (27 Jul 2018 08:07) (149/65 - 182/70)  RR: 18 (27 Jul 2018 08:07) (18 - 19)  SpO2: 96% (27 Jul 2018 08:07) (96% - 100%)    General: No acute distress  HEENT: poor effort on exam  Abdomen: Soft, nontender, nondistended   Extremities: No edema    NEUROLOGICAL EXAM:  Mental status: Awake, but following commands, no aphasia, no neglect, normal memory   Cranial Nerves: No facial asymmetry, no nystagmus, no dysarthria,  tongue midline  Motor exam: Normal tone, poor effort on motor exam  Sensation: Intact to light touch   Coordination/ Gait: gait not assessed    LABS:                        11.7   13.11 )-----------( 226      ( 26 Jul 2018 08:29 )             36.8    07-26    138  |  100  |  20  ----------------------------<  181<H>  3.8   |  25  |  1.37<H>    Ca    8.7      26 Jul 2018 07:22      Hemoglobin A1C, Whole Blood: 7.4 % (07-24 @ 08:23)  Hemoglobin A1C, Whole Blood: 9.1 % (05-16 @ 11:05)      IMAGING: Reviewed by me.     TTE ( 07.26.18)  1. Normal left ventricular internal dimensions and wall  thickness.  2. Normal left ventricular systolic function. No segmental  wall motion abnormalities.  3. Mild diastolic dysfunction (Stage I).  4. Normal right ventricular size and function.  5. Estimated pulmonary artery systolic pressure equals 44  mm Hg, assuming right atrial pressure equals 8 mm Hg,  consistent with mild pulmonary pressures.    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  Probable subacute infarct in the left corona radiata.  Neck MRA: Significantly limited study however no gross high-grade   stenosis is identified.  Brain MRA: Multifocal narrowing of the right anterior cerebral artery   with poor visualization of the distal segment.  Multifocal M1 segment narrowing likely on the basis of atherosclerotic   disease. HPI:  89 year old Right handed [place] female with past medical history of DM2, HTN, HLD, Hypothyroidism, asthma presented with left arm weakness and difficulty walking. Patient stated she was in her usual state of health until  evening prior to admission when she tried to bend down to pick something up off the floor, was unable to stand up, and subsequently fell to her knees. Patient then required help to stand up and went to bed. On 7/23/2018 afternoon, around 1 pm, patient was noted by family to have her left arm handing limp while sitting down. Patient then attempted to stand but was unable to and required assistance. At that time, patient denied any accompanying changes in vision, changes in speech, numbness, or tingling, dizziness, or confusion.  At baseline, patient ambulates with a cane.  Patient states she takes Aspirin 81mg daily and Lovastatin but is unsure of her other medications.     SUBJECTIVE: Per RN, neurological exam has changed from yesterday. She is more somnolent and not following commands this morning. Upon re-evaluation patient back to baseline.     acetaminophen   Tablet. 650 milliGRAM(s) Oral every 6 hours PRN  amLODIPine   Tablet 10 milliGRAM(s) Oral daily  amoxicillin  500 milliGRAM(s)/clavulanate 1 Tablet(s) Oral two times a day  apixaban 2.5 milliGRAM(s) Oral every 12 hours  atorvastatin 80 milliGRAM(s) Oral at bedtime  dextrose 40% Gel 15 Gram(s) Oral once PRN  dextrose 5%. 1000 milliLiter(s) IV Continuous <Continuous>  dextrose 50% Injectable 12.5 Gram(s) IV Push once  dextrose 50% Injectable 25 Gram(s) IV Push once  dextrose 50% Injectable 25 Gram(s) IV Push once  glucagon  Injectable 1 milliGRAM(s) IntraMuscular once PRN  hydrALAZINE Injectable 10 milliGRAM(s) IV Push every 6 hours PRN  insulin lispro (HumaLOG) corrective regimen sliding scale   SubCutaneous three times a day before meals    PHYSICAL EXAM:   Vital Signs Last 24 Hrs  T(C): 36.8 (27 Jul 2018 08:07), Max: 37 (27 Jul 2018 05:03)  T(F): 98.3 (27 Jul 2018 08:07), Max: 98.6 (27 Jul 2018 05:03)  HR: 73 (27 Jul 2018 08:07) (67 - 84)  BP: 181/73 (27 Jul 2018 08:07) (149/65 - 182/70)  RR: 18 (27 Jul 2018 08:07) (18 - 19)  SpO2: 96% (27 Jul 2018 08:07) (96% - 100%)    General: No acute distress  HEENT: VFF, EMOi  Abdomen: Soft, nontender, nondistended   Extremities: No edema    NEUROLOGICAL EXAM:  Mental status: Awake, alert to verbal stimuli, no neglect, oriented to person, hospital, month/year, follows commands, ID objects   Cranial Nerves: No facial asymmetry, no nystagmus, no dysarthria,  tongue midline  Motor exam: Normal tone, no drift, 5/5 RUE, 5/5 RLE, left hemiparesis: LUE 4/5 LLE 3/5  Sensation: Intact to light touch   Coordination/ Gait: No dysmetria     LABS:                        11.7   13.11 )-----------( 226      ( 26 Jul 2018 08:29 )             36.8    07-26    138  |  100  |  20  ----------------------------<  181<H>  3.8   |  25  |  1.37<H>    Ca    8.7      26 Jul 2018 07:22    Hemoglobin A1C, Whole Blood: 7.4 % (07-24 @ 08:23)  Hemoglobin A1C, Whole Blood: 9.1 % (05-16 @ 11:05)    IMAGING: Reviewed by me.     TTE ( 07.26.18)  1. Normal left ventricular internal dimensions and wall  thickness.  2. Normal left ventricular systolic function. No segmental  wall motion abnormalities.  3. Mild diastolic dysfunction (Stage I).  4. Normal right ventricular size and function.  5. Estimated pulmonary artery systolic pressure equals 44  mm Hg, assuming right atrial pressure equals 8 mm Hg,  consistent with mild pulmonary pressures.    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  Probable subacute infarct in the left corona radiata.  Neck MRA: Significantly limited study however no gross high-grade   stenosis is identified.  Brain MRA: Multifocal narrowing of the right anterior cerebral artery   with poor visualization of the distal segment.  Multifocal M1 segment narrowing likely on the basis of atherosclerotic   disease.

## 2018-07-28 RX ADMIN — ATORVASTATIN CALCIUM 80 MILLIGRAM(S): 80 TABLET, FILM COATED ORAL at 21:06

## 2018-07-28 RX ADMIN — APIXABAN 2.5 MILLIGRAM(S): 2.5 TABLET, FILM COATED ORAL at 17:49

## 2018-07-28 RX ADMIN — APIXABAN 2.5 MILLIGRAM(S): 2.5 TABLET, FILM COATED ORAL at 05:54

## 2018-07-28 RX ADMIN — Medication 10 MILLIGRAM(S): at 20:54

## 2018-07-28 RX ADMIN — Medication 10 MILLIGRAM(S): at 01:04

## 2018-07-28 RX ADMIN — LIDOCAINE 1 PATCH: 4 CREAM TOPICAL at 23:39

## 2018-07-28 RX ADMIN — Medication 1 TABLET(S): at 17:49

## 2018-07-28 RX ADMIN — Medication 650 MILLIGRAM(S): at 06:24

## 2018-07-28 RX ADMIN — Medication 2: at 17:49

## 2018-07-28 RX ADMIN — LIDOCAINE 1 PATCH: 4 CREAM TOPICAL at 01:14

## 2018-07-28 RX ADMIN — Medication 1 TABLET(S): at 05:54

## 2018-07-28 RX ADMIN — Medication 650 MILLIGRAM(S): at 05:54

## 2018-07-28 RX ADMIN — AMLODIPINE BESYLATE 10 MILLIGRAM(S): 2.5 TABLET ORAL at 05:54

## 2018-07-28 RX ADMIN — Medication 1: at 08:48

## 2018-07-28 RX ADMIN — LIDOCAINE 1 PATCH: 4 CREAM TOPICAL at 11:38

## 2018-07-28 NOTE — PROGRESS NOTE ADULT - SUBJECTIVE AND OBJECTIVE BOX
Subjective: Patient seen and examined. No new events except as noted.   resting comfortably in bed     REVIEW OF SYSTEMS:    CONSTITUTIONAL: + weakness, fevers or chills  EYES/ENT: No visual changes;  No vertigo or throat pain   NECK: No pain or stiffness  RESPIRATORY: No cough, wheezing, hemoptysis; No shortness of breath  CARDIOVASCULAR: No chest pain or palpitations  GASTROINTESTINAL: No abdominal or epigastric pain. No nausea, vomiting, or hematemesis; No diarrhea or constipation. No melena or hematochezia.  GENITOURINARY: No dysuria, frequency or hematuria  NEUROLOGICAL: = numbness or weakness  SKIN: No itching, burning, rashes, or lesions   All other review of systems is negative unless indicated above.    MEDICATIONS:  MEDICATIONS  (STANDING):  amLODIPine   Tablet 10 milliGRAM(s) Oral daily  amoxicillin  500 milliGRAM(s)/clavulanate 1 Tablet(s) Oral two times a day  apixaban 2.5 milliGRAM(s) Oral every 12 hours  atorvastatin 80 milliGRAM(s) Oral at bedtime  dextrose 5%. 1000 milliLiter(s) (50 mL/Hr) IV Continuous <Continuous>  dextrose 50% Injectable 12.5 Gram(s) IV Push once  dextrose 50% Injectable 25 Gram(s) IV Push once  dextrose 50% Injectable 25 Gram(s) IV Push once  insulin lispro (HumaLOG) corrective regimen sliding scale   SubCutaneous three times a day before meals  lidocaine   Patch 1 Patch Transdermal daily      PHYSICAL EXAM:  T(C): 36.8 (07-28-18 @ 20:27), Max: 37 (07-28-18 @ 00:32)  HR: 91 (07-28-18 @ 21:44) (68 - 91)  BP: 170/77 (07-28-18 @ 21:44) (158/68 - 181/83)  RR: 18 (07-28-18 @ 20:27) (18 - 18)  SpO2: 96% (07-28-18 @ 20:27) (96% - 98%)  Wt(kg): --  I&O's Summary    27 Jul 2018 07:01  -  28 Jul 2018 07:00  --------------------------------------------------------  IN: 600 mL / OUT: 1200 mL / NET: -600 mL    28 Jul 2018 07:01  -  28 Jul 2018 22:01  --------------------------------------------------------  IN: 120 mL / OUT: 300 mL / NET: -180 mL          Appearance: Normal	  HEENT:   Normal oral mucosa, PERRL, EOMI	  Lymphatic: No lymphadenopathy , no edema  Cardiovascular: irregular  S1 S2, No JVD, No murmurs , Peripheral pulses palpable 2+ bilaterally  Respiratory: Lungs clear to auscultation, normal effort 	  Gastrointestinal:  Soft, Non-tender, + BS	  Skin: No rashes, No ecchymoses, No cyanosis, warm to touch  Musculoskeletal: decreased  range of motion, and  strength  Psychiatry:  lethargic   Ext: No edema  NEUROLOGICAL EXAM:  Mental status: Awake, alert to verbal stimuli, no neglect, oriented to person, hospital, month/year, follows commands, ID objects   Cranial Nerves: No facial asymmetry, no nystagmus, no dysarthria,  tongue midline  Motor exam: Normal tone, 5/5 RUE, 5/5 RLE, left hemiparesis: LUE 4+/5 LLE 3+/5 - slight drift   Sensation: Intact to light touch   Coordination/ Gait: No dysmetria       LABS:    CARDIAC MARKERS:                                11.9   9.8   )-----------( 209      ( 27 Jul 2018 09:44 )             37.1     07-27    136  |  98  |  20  ----------------------------<  199<H>  3.7   |  25  |  1.37<H>    Ca    8.8      27 Jul 2018 09:44      proBNP:   Lipid Profile:   HgA1c:   TSH:     Trace          TELEMETRY: 	    ECG:  	  RADIOLOGY:   DIAGNOSTIC TESTING:  [ ] Echocardiogram:  [ ]  Catheterization:  [ ] Stress Test:    OTHER:

## 2018-07-28 NOTE — PROGRESS NOTE ADULT - ASSESSMENT
89 y/o Right handed [place] female with past medical history of DM2, HTN, HLD, Hypothyroidism, Asthma, ? a fib presented with left arm weakness and difficulty walking.   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  Incidental subacute left corona radiata infarct.     NEURO: neurologically overall without acute change, patient was somnolent AM 7/27, upon re-evaluation was noted to be back to baseline. CTH noted without interval change. Permissive HTN with slow titration to normotension, Started on Atorvastatin 80mg QHS- titrate statin to LDL goal less than 70, MR imaging as noted above. PT/OT/PMR recommends Acute Rehab.    ANTITHROMBOTIC THERAPY: Apixaban 2.5 mg bid (age > 80 cr >1.5) for secondary stroke prevention in setting of Atrial fibrillation    PULMONARY: CXR on admission showed small bilateral pleural effusions. Questionable right shoulder fracture. obtained dedicated films which demonstrates chronicity , protecting airway, saturating well. no evidence of CHF.     CARDIOVASCULAR: TTE results noted above, cardiac monitoring a fib, ensure rate control, Dr. Rajan consult appreciated.                               SBP goal: slow titration to normotension     GASTROINTESTINAL: dysphagia screen passed, tolerating diet, advance as tolerated       Diet: puree     RENAL: Cr slightly elevated, BMP without acute change, CKD stage 3b, good urine output, maintain adequate hydration      Na Goal: Greater than 135     Robles: n     HEMATOLOGY: H/H previously without acute change,  no active bleeding noted.     DVT ppx: Apixaban 2.5 mg BID    ID: afebrile, leukocytosis resolved, UA: 10-25 RBC, few bacteria, small blood, trace LE, trace glucose- ucx [id number] ecoli, GBS will continue to follow - stated some frequency but denies dysuria, hematuria, back or abdominal pain. Augmentin started for 5-7 day course. Repeat UA turbid, moderate LE, small blood, 25-50 wbc, few bacteria/Cx pending Encourage outpatient follow up.      OTHER:  B/l shoulder x ray on 7/26: Chronic appearing avulsion fracture of the right greater tuberosity, no acute fractures noted and severe bilateral glenohumeral joint osteoarthritis. Pain control prn.     DISPOSITION: Acute Rehab.    CORE MEASURES:        Admission NIHSS: 3      TPA: [x] YES [] NO      LDL/HDL: 171/55     Depression Screen:  0     Statin Therapy: y      Dysphagia Screen: [x] PASS [] FAIL     Smoking [] YES [x] NO      Afib [x?] YES [] NO     Stroke Education [x] YES [] NO

## 2018-07-29 RX ORDER — CIPROFLOXACIN LACTATE 400MG/40ML
500 VIAL (ML) INTRAVENOUS DAILY
Qty: 0 | Refills: 0 | Status: DISCONTINUED | OUTPATIENT
Start: 2018-07-29 | End: 2018-07-30

## 2018-07-29 RX ADMIN — Medication 1: at 12:51

## 2018-07-29 RX ADMIN — ATORVASTATIN CALCIUM 80 MILLIGRAM(S): 80 TABLET, FILM COATED ORAL at 22:27

## 2018-07-29 RX ADMIN — APIXABAN 2.5 MILLIGRAM(S): 2.5 TABLET, FILM COATED ORAL at 18:16

## 2018-07-29 RX ADMIN — LIDOCAINE 1 PATCH: 4 CREAM TOPICAL at 12:50

## 2018-07-29 RX ADMIN — APIXABAN 2.5 MILLIGRAM(S): 2.5 TABLET, FILM COATED ORAL at 10:36

## 2018-07-29 RX ADMIN — AMLODIPINE BESYLATE 10 MILLIGRAM(S): 2.5 TABLET ORAL at 12:53

## 2018-07-29 RX ADMIN — Medication 500 MILLIGRAM(S): at 18:16

## 2018-07-29 RX ADMIN — Medication 1: at 08:58

## 2018-07-29 RX ADMIN — Medication 1 TABLET(S): at 13:14

## 2018-07-29 RX ADMIN — Medication 1: at 18:16

## 2018-07-29 NOTE — PROGRESS NOTE ADULT - SUBJECTIVE AND OBJECTIVE BOX
HPI:  89 year old Right handed [place] female with past medical history of DM2, HTN, HLD, Hypothyroidism, asthma presented with left arm weakness and difficulty walking. Patient stated she was in her usual state of health until  evening prior to admission when she tried to bend down to pick something up off the floor, was unable to stand up, and subsequently fell to her knees. Patient then required help to stand up and went to bed. On 7/23/2018 afternoon, around 1 pm, patient was noted by family to have her left arm handing limp while sitting down. Patient then attempted to stand but was unable to and required assistance. At that time, patient denied any accompanying changes in vision, changes in speech, numbness, or tingling, dizziness, or confusion.  At baseline, patient ambulates with a cane.  Patient states she takes Aspirin 81mg daily and Lovastatin but is unsure of her other medications.     SUBJECTIVE: No events overnight.  No new neurologic complaints.      acetaminophen   Tablet. 650 milliGRAM(s) Oral every 6 hours PRN  amLODIPine   Tablet 10 milliGRAM(s) Oral daily  amoxicillin  500 milliGRAM(s)/clavulanate 1 Tablet(s) Oral two times a day  apixaban 2.5 milliGRAM(s) Oral every 12 hours  atorvastatin 80 milliGRAM(s) Oral at bedtime  dextrose 40% Gel 15 Gram(s) Oral once PRN  dextrose 5%. 1000 milliLiter(s) IV Continuous <Continuous>  dextrose 50% Injectable 12.5 Gram(s) IV Push once  dextrose 50% Injectable 25 Gram(s) IV Push once  dextrose 50% Injectable 25 Gram(s) IV Push once  glucagon  Injectable 1 milliGRAM(s) IntraMuscular once PRN  hydrALAZINE Injectable 10 milliGRAM(s) IV Push every 6 hours PRN  insulin lispro (HumaLOG) corrective regimen sliding scale   SubCutaneous three times a day before meals  lidocaine   Patch 1 Patch Transdermal daily      PHYSICAL EXAM:   Vital Signs Last 24 Hrs  T(C): 37.3 (29 Jul 2018 07:47), Max: 37.3 (29 Jul 2018 07:47)  T(F): 99.2 (29 Jul 2018 07:47), Max: 99.2 (29 Jul 2018 07:47)  HR: 73 (29 Jul 2018 07:47) (68 - 91)  BP: 161/76 (29 Jul 2018 07:47) (146/70 - 181/83)  RR: 18 (29 Jul 2018 07:47) (18 - 18)  SpO2: 97% (29 Jul 2018 07:47) (96% - 97%)    General: No acute distress  HEENT: VFF, EMOi  Abdomen: Soft, nontender, nondistended   Extremities: No edema    NEUROLOGICAL EXAM:  Mental status: Awake, alert to verbal stimuli, no neglect, oriented to person, hospital, month/year, follows commands, ID objects   Cranial Nerves: No facial asymmetry, no nystagmus, no dysarthria,  tongue midline  Motor exam: Normal tone, 5/5 RUE, 5/5 RLE, left hemiparesis: LUE 4+/5 LLE 3+/5 - slight drift   Sensation: Intact to light touch   Coordination/ Gait: No dysmetria     LABS:         Hemoglobin A1C, Whole Blood: 7.4 % (07-24 @ 08:23)  Hemoglobin A1C, Whole Blood: 9.1 % (05-16 @ 11:05)      IMAGING: Reviewed by me.     TTE ( 07.26.18)  1. Normal left ventricular internal dimensions and wall  thickness.  2. Normal left ventricular systolic function. No segmental  wall motion abnormalities.  3. Mild diastolic dysfunction (Stage I).  4. Normal right ventricular size and function.  5. Estimated pulmonary artery systolic pressure equals 44  mm Hg, assuming right atrial pressure equals 8 mm Hg,  consistent with mild pulmonary pressures.    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  Probable subacute infarct in the left corona radiata.  Neck MRA: Significantly limited study however no gross high-grade   stenosis is identified.  Brain MRA: Multifocal narrowing of the right anterior cerebral artery   with poor visualization of the distal segment.  Multifocal M1 segment narrowing likely on the basis of atherosclerotic   disease. HPI:  89 year old Right handed [place]ian female with past medical history of DM2, HTN, HLD, Hypothyroidism, asthma presented with left arm weakness and difficulty walking. Patient stated she was in her usual state of health until  evening prior to admission when she tried to bend down to pick something up off the floor, was unable to stand up, and subsequently fell to her knees. Patient then required help to stand up and went to bed. On 7/23/2018 afternoon, around 1 pm, patient was noted by family to have her left arm handing limp while sitting down. Patient then attempted to stand but was unable to and required assistance. At that time, patient denied any accompanying changes in vision, changes in speech, numbness, or tingling, dizziness, or confusion.  At baseline, patient ambulates with a cane.  Patient states she takes Aspirin 81mg daily and Lovastatin but is unsure of her other medications.     SUBJECTIVE: No events overnight.  No new neurologic complaints.      acetaminophen   Tablet. 650 milliGRAM(s) Oral every 6 hours PRN  amLODIPine   Tablet 10 milliGRAM(s) Oral daily  amoxicillin  500 milliGRAM(s)/clavulanate 1 Tablet(s) Oral two times a day  apixaban 2.5 milliGRAM(s) Oral every 12 hours  atorvastatin 80 milliGRAM(s) Oral at bedtime  dextrose 40% Gel 15 Gram(s) Oral once PRN  dextrose 5%. 1000 milliLiter(s) IV Continuous <Continuous>  dextrose 50% Injectable 12.5 Gram(s) IV Push once  dextrose 50% Injectable 25 Gram(s) IV Push once  dextrose 50% Injectable 25 Gram(s) IV Push once  glucagon  Injectable 1 milliGRAM(s) IntraMuscular once PRN  hydrALAZINE Injectable 10 milliGRAM(s) IV Push every 6 hours PRN  insulin lispro (HumaLOG) corrective regimen sliding scale   SubCutaneous three times a day before meals  lidocaine   Patch 1 Patch Transdermal daily      PHYSICAL EXAM:   Vital Signs Last 24 Hrs  T(C): 37.3 (29 Jul 2018 07:47), Max: 37.3 (29 Jul 2018 07:47)  T(F): 99.2 (29 Jul 2018 07:47), Max: 99.2 (29 Jul 2018 07:47)  HR: 73 (29 Jul 2018 07:47) (68 - 91)  BP: 161/76 (29 Jul 2018 07:47) (146/70 - 181/83)  RR: 18 (29 Jul 2018 07:47) (18 - 18)  SpO2: 97% (29 Jul 2018 07:47) (96% - 97%)    General: No acute distress  HEENT: VFF, EMOi  Abdomen: Soft, nontender, nondistended   Extremities: No edema    NEUROLOGICAL EXAM:  Mental status: Awake, alert to verbal stimuli, psychomotor slowing possible abulia oriented to person, hospital, month/year, follows commands, ID objects   Cranial Nerves: No facial asymmetry, no nystagmus, no dysarthria,  tongue midline  Motor exam: Normal tone, 5/5 RUE, 5/5 RLE, left hemiparesis: LUE 4+/5 LLE 3+/5 - slight drift   Sensation: Intact to light touch   Coordination/ Gait: No dysmetria     LABS:         Hemoglobin A1C, Whole Blood: 7.4 % (07-24 @ 08:23)  Hemoglobin A1C, Whole Blood: 9.1 % (05-16 @ 11:05)      IMAGING: Reviewed by me.     TTE ( 07.26.18)  1. Normal left ventricular internal dimensions and wall  thickness.  2. Normal left ventricular systolic function. No segmental  wall motion abnormalities.  3. Mild diastolic dysfunction (Stage I).  4. Normal right ventricular size and function.  5. Estimated pulmonary artery systolic pressure equals 44  mm Hg, assuming right atrial pressure equals 8 mm Hg,  consistent with mild pulmonary pressures.    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  Probable subacute infarct in the left corona radiata.  Neck MRA: Significantly limited study however no gross high-grade   stenosis is identified.  Brain MRA: Multifocal narrowing of the right anterior cerebral artery   with poor visualization of the distal segment.  Multifocal M1 segment narrowing likely on the basis of atherosclerotic   disease.

## 2018-07-29 NOTE — PROGRESS NOTE ADULT - SUBJECTIVE AND OBJECTIVE BOX
Subjective: Patient seen and examined. No new events except as noted.   resting comfortably in bed     REVIEW OF SYSTEMS:    CONSTITUTIONAL: + weakness, fevers or chills  EYES/ENT: No visual changes;  No vertigo or throat pain   NECK: No pain or stiffness  RESPIRATORY: No cough, wheezing, hemoptysis; No shortness of breath  CARDIOVASCULAR: No chest pain or palpitations  GASTROINTESTINAL: No abdominal or epigastric pain. No nausea, vomiting, or hematemesis; No diarrhea or constipation. No melena or hematochezia.  GENITOURINARY: No dysuria, frequency or hematuria  NEUROLOGICAL: + numbness or weakness  SKIN: No itching, burning, rashes, or lesions   All other review of systems is negative unless indicated above.    MEDICATIONS:  MEDICATIONS  (STANDING):  amLODIPine   Tablet 10 milliGRAM(s) Oral daily  amoxicillin  500 milliGRAM(s)/clavulanate 1 Tablet(s) Oral two times a day  apixaban 2.5 milliGRAM(s) Oral every 12 hours  atorvastatin 80 milliGRAM(s) Oral at bedtime  dextrose 5%. 1000 milliLiter(s) (50 mL/Hr) IV Continuous <Continuous>  dextrose 50% Injectable 12.5 Gram(s) IV Push once  dextrose 50% Injectable 25 Gram(s) IV Push once  dextrose 50% Injectable 25 Gram(s) IV Push once  insulin lispro (HumaLOG) corrective regimen sliding scale   SubCutaneous three times a day before meals  lidocaine   Patch 1 Patch Transdermal daily      PHYSICAL EXAM:  T(C): 36.9 (07-29-18 @ 04:24), Max: 37.2 (07-28-18 @ 23:22)  HR: 78 (07-29-18 @ 04:24) (68 - 91)  BP: 146/70 (07-29-18 @ 04:24) (146/70 - 181/83)  RR: 18 (07-29-18 @ 04:24) (18 - 18)  SpO2: 97% (07-29-18 @ 04:24) (96% - 97%)  Wt(kg): --  I&O's Summary    28 Jul 2018 07:01  -  29 Jul 2018 07:00  --------------------------------------------------------  IN: 180 mL / OUT: 300 mL / NET: -120 mL          Appearance: NAD	  HEENT:   Normal oral mucosa, PERRL, EOMI	  Lymphatic: No lymphadenopathy , no edema  Cardiovascular: irregular S1 S2, No JVD, No murmurs , Peripheral pulses palpable 2+ bilaterally  Respiratory: Lungs clear to auscultation, normal effort 	  Gastrointestinal:  Soft, Non-tender, + BS	  Skin: No rashes, No ecchymoses, No cyanosis, warm to touch  Musculoskeletal: decrease  range of motion and  strength  Psychiatry: lethargic   Ext: No edema  NEUROLOGICAL EXAM:  Mental status: Awake, alert to verbal stimuli, no neglect, oriented to person, hospital, month/year, follows commands, ID objects   Cranial Nerves: No facial asymmetry, no nystagmus, no dysarthria,  tongue midline  Motor exam: Normal tone, 5/5 RUE, 5/5 RLE, left hemiparesis: LUE 4+/5 LLE 3+/5 - slight drift   Sensation: Intact to light touch   Coordination/ Gait: No dysmetria       LABS:    CARDIAC MARKERS:                                11.9   9.8   )-----------( 209      ( 27 Jul 2018 09:44 )             37.1     07-27    136  |  98  |  20  ----------------------------<  199<H>  3.7   |  25  |  1.37<H>    Ca    8.8      27 Jul 2018 09:44      proBNP:   Lipid Profile:   HgA1c:   TSH:     Trace          TELEMETRY: 	    ECG:  	  RADIOLOGY:   DIAGNOSTIC TESTING:  [ ] Echocardiogram:  [ ]  Catheterization:  [ ] Stress Test:    OTHER:

## 2018-07-29 NOTE — PROGRESS NOTE ADULT - ASSESSMENT
89 y/o Right handed [place] female with past medical history of DM2, HTN, HLD, Hypothyroidism, Asthma, ? a fib presented with left arm weakness and difficulty walking.   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  Incidental subacute left corona radiata infarct.     NEURO: neurologically overall without acute change, patient was somnolent AM 7/27, upon re-evaluation was noted to be back to baseline. CTH noted without interval change. Permissive HTN with slow titration to normotension, Started on Atorvastatin 80mg QHS- titrate statin to LDL goal less than 70, MR imaging as noted above. PT/OT/PMR recommends Acute Rehab.    ANTITHROMBOTIC THERAPY: Apixaban 2.5 mg bid (age > 80 cr >1.5) for secondary stroke prevention in setting of Atrial fibrillation    PULMONARY: CXR on admission showed small bilateral pleural effusions. Questionable right shoulder fracture. obtained dedicated films which demonstrates chronicity , protecting airway, saturating well. no evidence of CHF.     CARDIOVASCULAR: TTE results noted above, cardiac monitoring a fib, ensure rate control, Dr. Rajan consult appreciated.                               SBP goal: slow titration to normotension     GASTROINTESTINAL: dysphagia screen passed, tolerating diet, advance as tolerated       Diet: puree     RENAL: Cr slightly elevated, BMP without acute change, CKD stage 3b, good urine output, maintain adequate hydration      Na Goal: Greater than 135     Robles: n     HEMATOLOGY: H/H previously without acute change,  no active bleeding noted.     DVT ppx: Apixaban 2.5 mg BID    ID: afebrile, leukocytosis resolved, UA: 10-25 RBC, few bacteria, small blood, trace LE, trace glucose- ucx [id number] ecoli, GBS will continue to follow - stated some frequency but denies dysuria, hematuria, back or abdominal pain. Augmentin started for 5-7 day course. Repeat UA turbid, moderate LE, small blood, 25-50 wbc, few bacteria/Cx pending Encourage outpatient follow up.      OTHER:  B/l shoulder x ray on 7/26: Chronic appearing avulsion fracture of the right greater tuberosity, no acute fractures noted and severe bilateral glenohumeral joint osteoarthritis. Pain control prn.     DISPOSITION: Acute Rehab.    CORE MEASURES:        Admission NIHSS: 3      TPA: [x] YES [] NO      LDL/HDL: 171/55     Depression Screen:  0     Statin Therapy: y      Dysphagia Screen: [x] PASS [] FAIL     Smoking [] YES [x] NO      Afib [x?] YES [] NO     Stroke Education [x] YES [] NO 89 y/o Right handed [place] female with past medical history of DM2, HTN, HLD, Hypothyroidism, Asthma, ? a fib presented with left arm weakness and difficulty walking.   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  Incidental subacute left corona radiata infarct.     NEURO: neurologically overall without acute change, patient was somnolent AM 7/27, upon re-evaluation was noted to be back to baseline. CTH noted without interval change. Permissive HTN with slow titration to normotension, Started on Atorvastatin 80mg QHS- titrate statin to LDL goal less than 70, MR imaging as noted above. PT/OT/PMR recommends Acute Rehab.    ANTITHROMBOTIC THERAPY: Apixaban 2.5 mg bid (age > 80 cr >1.5) for secondary stroke prevention in setting of Atrial fibrillation    PULMONARY: CXR on admission showed small bilateral pleural effusions. Questionable right shoulder fracture. obtained dedicated films which demonstrates chronicity , protecting airway, saturating well. no evidence of CHF.     CARDIOVASCULAR: TTE results noted above, cardiac monitoring a fib, ensure rate control, Dr. Rajan consult appreciated.                               SBP goal: slow titration to normotension     GASTROINTESTINAL: dysphagia screen passed, tolerating diet, advance as tolerated       Diet: puree     RENAL: Cr slightly elevated, BMP without acute change, CKD stage 3b, good urine output, maintain adequate hydration      Na Goal: Greater than 135     Robles: n     HEMATOLOGY: H/H previously without acute change,  no active bleeding noted.     DVT ppx: Apixaban 2.5 mg BID    ID: afebrile, leukocytosis resolved, UA: 10-25 RBC, few bacteria, small blood, trace LE, trace glucose- ucx [id number] ecoli, GBS will continue to follow - stated some frequency but denies dysuria, hematuria, back or abdominal pain. Augmentin started for 5-7 day course, UC sensitive results back- Augmentin discontinued and Cipro 500 mg q24 for 7 days ordered. Follow up outpatient      OTHER:  B/l shoulder x ray on 7/26: Chronic appearing avulsion fracture of the right greater tuberosity, no acute fractures noted and severe bilateral glenohumeral joint osteoarthritis. Pain control prn. Lidocaine patches in place with some relief.    DISPOSITION: Acute Rehab.    CORE MEASURES:        Admission NIHSS: 3      TPA: [x] YES [] NO      LDL/HDL: 171/55     Depression Screen:  0     Statin Therapy: y      Dysphagia Screen: [x] PASS [] FAIL     Smoking [] YES [x] NO      Afib [x?] YES [] NO     Stroke Education [x] YES [] NO 89 y/o Right handed [place] female with past medical history of DM2, HTN, HLD, Hypothyroidism, Asthma, a fib off AC on arrival presented with left arm weakness and difficulty walking.   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  Incidental subacute left corona radiata infarct.     NEURO: neurologically overall without acute change, patient was somnolent AM 7/27, upon re-evaluation was noted to be back to baseline. CTH noted without interval change. Permissive HTN with slow titration to normotension, Started on Atorvastatin 80mg QHS- titrate statin to LDL goal less than 70, MR imaging as noted above. PT/OT/PMR recommends Acute Rehab.    ANTITHROMBOTIC THERAPY: Apixaban 2.5 mg bid (age > 80 cr >1.5) for secondary stroke prevention in setting of Atrial fibrillation    PULMONARY: CXR on admission showed small bilateral pleural effusions. Questionable right shoulder fracture. obtained dedicated films which demonstrates chronicity , protecting airway, saturating well. no evidence of CHF.     CARDIOVASCULAR: TTE results noted above, cardiac monitoring a fib, ensure rate control, Dr. Rajan consult appreciated.                               SBP goal: slow titration to normotension     GASTROINTESTINAL: dysphagia screen passed, tolerating diet, advance as tolerated       Diet: puree     RENAL: Cr slightly elevated, BMP without acute change, CKD stage 3b, good urine output, maintain adequate hydration      Na Goal: Greater than 135     Robles: n     HEMATOLOGY: H/H previously without acute change,  no active bleeding noted.     DVT ppx: Apixaban 2.5 mg BID    ID: afebrile, leukocytosis resolved, UA: 10-25 RBC, few bacteria, small blood, trace LE, trace glucose- ucx [id number] ecoli, GBS will continue to follow - stated some frequency but denies dysuria, hematuria, back or abdominal pain. Augmentin started for 5-7 day course, UC sensitive results back- Augmentin discontinued and Cipro 500 mg q24 for 7 days ordered. Follow up outpatient      OTHER:  B/l shoulder x ray on 7/26: Chronic appearing avulsion fracture of the right greater tuberosity, no acute fractures noted and severe bilateral glenohumeral joint osteoarthritis. Pain control prn. Lidocaine patches in place with some relief.    DISPOSITION: Acute Rehab.    CORE MEASURES:        Admission NIHSS: 3      TPA: [x] YES [] NO      LDL/HDL: 171/55     Depression Screen:  0     Statin Therapy: y      Dysphagia Screen: [x] PASS [] FAIL     Smoking [] YES [x] NO      Afib [x?] YES [] NO     Stroke Education [x] YES [] NO

## 2018-07-30 VITALS
SYSTOLIC BLOOD PRESSURE: 151 MMHG | HEART RATE: 71 BPM | RESPIRATION RATE: 18 BRPM | DIASTOLIC BLOOD PRESSURE: 74 MMHG | OXYGEN SATURATION: 97 %

## 2018-07-30 PROBLEM — E11.9 TYPE 2 DIABETES MELLITUS WITHOUT COMPLICATIONS: Chronic | Status: ACTIVE | Noted: 2018-07-23

## 2018-07-30 PROBLEM — I10 ESSENTIAL (PRIMARY) HYPERTENSION: Chronic | Status: ACTIVE | Noted: 2018-07-23

## 2018-07-30 PROBLEM — E03.9 HYPOTHYROIDISM, UNSPECIFIED: Chronic | Status: ACTIVE | Noted: 2018-07-23

## 2018-07-30 PROBLEM — E78.5 HYPERLIPIDEMIA, UNSPECIFIED: Chronic | Status: ACTIVE | Noted: 2018-07-23

## 2018-07-30 PROCEDURE — 82947 ASSAY GLUCOSE BLOOD QUANT: CPT

## 2018-07-30 PROCEDURE — 85730 THROMBOPLASTIN TIME PARTIAL: CPT

## 2018-07-30 PROCEDURE — 82962 GLUCOSE BLOOD TEST: CPT

## 2018-07-30 PROCEDURE — 84295 ASSAY OF SERUM SODIUM: CPT

## 2018-07-30 PROCEDURE — 83735 ASSAY OF MAGNESIUM: CPT

## 2018-07-30 PROCEDURE — 93306 TTE W/DOPPLER COMPLETE: CPT

## 2018-07-30 PROCEDURE — 82435 ASSAY OF BLOOD CHLORIDE: CPT

## 2018-07-30 PROCEDURE — 97166 OT EVAL MOD COMPLEX 45 MIN: CPT

## 2018-07-30 PROCEDURE — 93005 ELECTROCARDIOGRAM TRACING: CPT

## 2018-07-30 PROCEDURE — 73030 X-RAY EXAM OF SHOULDER: CPT

## 2018-07-30 PROCEDURE — 70547 MR ANGIOGRAPHY NECK W/O DYE: CPT

## 2018-07-30 PROCEDURE — 80053 COMPREHEN METABOLIC PANEL: CPT

## 2018-07-30 PROCEDURE — 70551 MRI BRAIN STEM W/O DYE: CPT

## 2018-07-30 PROCEDURE — 71045 X-RAY EXAM CHEST 1 VIEW: CPT

## 2018-07-30 PROCEDURE — 84484 ASSAY OF TROPONIN QUANT: CPT

## 2018-07-30 PROCEDURE — 99285 EMERGENCY DEPT VISIT HI MDM: CPT | Mod: 25

## 2018-07-30 PROCEDURE — 87186 SC STD MICRODIL/AGAR DIL: CPT

## 2018-07-30 PROCEDURE — 85027 COMPLETE CBC AUTOMATED: CPT

## 2018-07-30 PROCEDURE — 87086 URINE CULTURE/COLONY COUNT: CPT

## 2018-07-30 PROCEDURE — 82140 ASSAY OF AMMONIA: CPT

## 2018-07-30 PROCEDURE — 82803 BLOOD GASES ANY COMBINATION: CPT

## 2018-07-30 PROCEDURE — 83036 HEMOGLOBIN GLYCOSYLATED A1C: CPT

## 2018-07-30 PROCEDURE — 84132 ASSAY OF SERUM POTASSIUM: CPT

## 2018-07-30 PROCEDURE — 86900 BLOOD TYPING SEROLOGIC ABO: CPT

## 2018-07-30 PROCEDURE — 70544 MR ANGIOGRAPHY HEAD W/O DYE: CPT

## 2018-07-30 PROCEDURE — 80061 LIPID PANEL: CPT

## 2018-07-30 PROCEDURE — 97530 THERAPEUTIC ACTIVITIES: CPT

## 2018-07-30 PROCEDURE — 80048 BASIC METABOLIC PNL TOTAL CA: CPT

## 2018-07-30 PROCEDURE — 86901 BLOOD TYPING SEROLOGIC RH(D): CPT

## 2018-07-30 PROCEDURE — 70450 CT HEAD/BRAIN W/O DYE: CPT

## 2018-07-30 PROCEDURE — 36415 COLL VENOUS BLD VENIPUNCTURE: CPT

## 2018-07-30 PROCEDURE — 86850 RBC ANTIBODY SCREEN: CPT

## 2018-07-30 PROCEDURE — 85014 HEMATOCRIT: CPT

## 2018-07-30 PROCEDURE — 97116 GAIT TRAINING THERAPY: CPT

## 2018-07-30 PROCEDURE — 97161 PT EVAL LOW COMPLEX 20 MIN: CPT

## 2018-07-30 PROCEDURE — 85610 PROTHROMBIN TIME: CPT

## 2018-07-30 PROCEDURE — 73564 X-RAY EXAM KNEE 4 OR MORE: CPT

## 2018-07-30 PROCEDURE — 82330 ASSAY OF CALCIUM: CPT

## 2018-07-30 PROCEDURE — 81001 URINALYSIS AUTO W/SCOPE: CPT

## 2018-07-30 PROCEDURE — 84443 ASSAY THYROID STIM HORMONE: CPT

## 2018-07-30 PROCEDURE — 83605 ASSAY OF LACTIC ACID: CPT

## 2018-07-30 RX ORDER — FELODIPINE 5 MG/1
2 TABLET, FILM COATED, EXTENDED RELEASE ORAL
Qty: 0 | Refills: 0 | COMMUNITY

## 2018-07-30 RX ORDER — AMLODIPINE BESYLATE 2.5 MG/1
1 TABLET ORAL
Qty: 0 | Refills: 0 | COMMUNITY
Start: 2018-07-30

## 2018-07-30 RX ORDER — LIDOCAINE 4 G/100G
1 CREAM TOPICAL
Qty: 0 | Refills: 0 | COMMUNITY
Start: 2018-07-30

## 2018-07-30 RX ORDER — APIXABAN 2.5 MG/1
1 TABLET, FILM COATED ORAL
Qty: 0 | Refills: 0 | COMMUNITY
Start: 2018-07-30

## 2018-07-30 RX ORDER — INSULIN LISPRO 100/ML
1 VIAL (ML) SUBCUTANEOUS
Qty: 0 | Refills: 0 | COMMUNITY
Start: 2018-07-30

## 2018-07-30 RX ORDER — GLYBURIDE 5 MG
1 TABLET ORAL
Qty: 0 | Refills: 0 | COMMUNITY

## 2018-07-30 RX ORDER — CIPROFLOXACIN LACTATE 400MG/40ML
1 VIAL (ML) INTRAVENOUS
Qty: 0 | Refills: 0 | COMMUNITY
Start: 2018-07-30 | End: 2018-08-04

## 2018-07-30 RX ORDER — ATORVASTATIN CALCIUM 80 MG/1
1 TABLET, FILM COATED ORAL
Qty: 0 | Refills: 0 | COMMUNITY
Start: 2018-07-30

## 2018-07-30 RX ORDER — LEVOTHYROXINE SODIUM 125 MCG
112 TABLET ORAL DAILY
Qty: 0 | Refills: 0 | Status: DISCONTINUED | OUTPATIENT
Start: 2018-07-30 | End: 2018-07-30

## 2018-07-30 RX ORDER — HYDRALAZINE HCL 50 MG
10 TABLET ORAL
Qty: 0 | Refills: 0 | COMMUNITY
Start: 2018-07-30

## 2018-07-30 RX ADMIN — AMLODIPINE BESYLATE 10 MILLIGRAM(S): 2.5 TABLET ORAL at 06:16

## 2018-07-30 RX ADMIN — Medication 1: at 12:55

## 2018-07-30 RX ADMIN — Medication 500 MILLIGRAM(S): at 12:55

## 2018-07-30 RX ADMIN — Medication 1: at 08:57

## 2018-07-30 RX ADMIN — APIXABAN 2.5 MILLIGRAM(S): 2.5 TABLET, FILM COATED ORAL at 06:16

## 2018-07-30 RX ADMIN — LIDOCAINE 1 PATCH: 4 CREAM TOPICAL at 12:55

## 2018-07-30 NOTE — PROGRESS NOTE ADULT - SUBJECTIVE AND OBJECTIVE BOX
Subjective: Patient seen and examined. No new events except as noted.   no cp or sob     REVIEW OF SYSTEMS:    CONSTITUTIONAL: + weakness, fevers or chills  EYES/ENT: No visual changes;  No vertigo or throat pain   NECK: No pain or stiffness  RESPIRATORY: No cough, wheezing, hemoptysis; No shortness of breath  CARDIOVASCULAR: No chest pain or palpitations  GASTROINTESTINAL: No abdominal or epigastric pain. No nausea, vomiting, or hematemesis; No diarrhea or constipation. No melena or hematochezia.  GENITOURINARY: No dysuria, frequency or hematuria  NEUROLOGICAL: +numbness or weakness  SKIN: No itching, burning, rashes, or lesions   All other review of systems is negative unless indicated above.    MEDICATIONS:  MEDICATIONS  (STANDING):  amLODIPine   Tablet 10 milliGRAM(s) Oral daily  apixaban 2.5 milliGRAM(s) Oral every 12 hours  atorvastatin 80 milliGRAM(s) Oral at bedtime  ciprofloxacin     Tablet 500 milliGRAM(s) Oral daily  dextrose 5%. 1000 milliLiter(s) (50 mL/Hr) IV Continuous <Continuous>  dextrose 50% Injectable 12.5 Gram(s) IV Push once  dextrose 50% Injectable 25 Gram(s) IV Push once  dextrose 50% Injectable 25 Gram(s) IV Push once  insulin lispro (HumaLOG) corrective regimen sliding scale   SubCutaneous three times a day before meals  lidocaine   Patch 1 Patch Transdermal daily      PHYSICAL EXAM:  T(C): 36.9 (07-30-18 @ 08:34), Max: 37.1 (07-29-18 @ 15:31)  HR: 66 (07-30-18 @ 08:34) (66 - 70)  BP: 155/74 (07-30-18 @ 08:34) (150/79 - 188/74)  RR: 18 (07-30-18 @ 08:34) (18 - 18)  SpO2: 97% (07-30-18 @ 08:34) (96% - 98%)  Wt(kg): --  I&O's Summary    29 Jul 2018 07:01  -  30 Jul 2018 07:00  --------------------------------------------------------  IN: 120 mL / OUT: 200 mL / NET: -80 mL          Appearance: NAD	  HEENT:   Normal oral mucosa, PERRL, EOMI	  Lymphatic: No lymphadenopathy , no edema  Cardiovascular: Normal S1 S2, No JVD, No murmurs , Peripheral pulses palpable 2+ bilaterally  Respiratory: Lungs clear to auscultation, normal effort 	  Gastrointestinal:  Soft, Non-tender, + BS	  Skin: No rashes, No ecchymoses, No cyanosis, warm to touch  Musculoskeletal: Decreased  range of motion and  strength  Psychiatry:  Mood & affect appropriate  Ext: No edema  NEUROLOGICAL EXAM:  Mental status: Awake, alert to verbal stimuli, psychomotor slowing possible abulia oriented to person, hospital, month/year, follows commands, ID objects   Cranial Nerves: No facial asymmetry, no nystagmus, no dysarthria,  tongue midline  Motor exam: Normal tone, 5/5 RUE, 5/5 RLE, left hemiparesis: LUE 4+/5 LLE 3+/5 - slight drift   Sensation: Intact to light touch   Coordination/ Gait: No dysmetria       LABS:    CARDIAC MARKERS:                  proBNP:   Lipid Profile:   HgA1c:   TSH:     Trace          TELEMETRY: 	    ECG:  	  RADIOLOGY:   DIAGNOSTIC TESTING:  [ ] Echocardiogram:  [ ]  Catheterization:  [ ] Stress Test:    OTHER:

## 2018-07-30 NOTE — PROGRESS NOTE ADULT - PROBLEM SELECTOR PLAN 2
rate controlled   Start Eliquis
rate controlled    on  Eliquis
rate controlled   Started on  Eliquis
rate controlled   Start Eliquis

## 2018-07-30 NOTE — PROGRESS NOTE ADULT - PROBLEM SELECTOR PLAN 1
Likely embolic   On Eliquis  Awaiting ANKUSH
Likely embolic   Started Eliquis  Awaiting ANKUSH
Likely embolic   Start Eliquis
Likely embolic   Start Eliquis

## 2018-09-20 ENCOUNTER — APPOINTMENT (OUTPATIENT)
Dept: ORTHOPEDIC SURGERY | Facility: CLINIC | Age: 83
End: 2018-09-20

## 2018-10-01 ENCOUNTER — APPOINTMENT (OUTPATIENT)
Dept: ORTHOPEDIC SURGERY | Facility: CLINIC | Age: 83
End: 2018-10-01
Payer: MEDICARE

## 2018-10-01 VITALS — WEIGHT: 170 LBS | HEIGHT: 64 IN | BODY MASS INDEX: 29.02 KG/M2

## 2018-10-01 VITALS — DIASTOLIC BLOOD PRESSURE: 69 MMHG | HEART RATE: 67 BPM | SYSTOLIC BLOOD PRESSURE: 133 MMHG

## 2018-10-01 DIAGNOSIS — Z86.79 PERSONAL HISTORY OF OTHER DISEASES OF THE CIRCULATORY SYSTEM: ICD-10-CM

## 2018-10-01 DIAGNOSIS — Z87.09 PERSONAL HISTORY OF OTHER DISEASES OF THE RESPIRATORY SYSTEM: ICD-10-CM

## 2018-10-01 DIAGNOSIS — Z78.9 OTHER SPECIFIED HEALTH STATUS: ICD-10-CM

## 2018-10-01 DIAGNOSIS — Z86.39 PERSONAL HISTORY OF OTHER ENDOCRINE, NUTRITIONAL AND METABOLIC DISEASE: ICD-10-CM

## 2018-10-01 DIAGNOSIS — Z80.9 FAMILY HISTORY OF MALIGNANT NEOPLASM, UNSPECIFIED: ICD-10-CM

## 2018-10-01 PROCEDURE — 99204 OFFICE O/P NEW MOD 45 MIN: CPT | Mod: 25

## 2018-10-01 PROCEDURE — 20610 DRAIN/INJ JOINT/BURSA W/O US: CPT | Mod: 50

## 2018-10-14 ENCOUNTER — INPATIENT (INPATIENT)
Facility: HOSPITAL | Age: 83
LOS: 3 days | Discharge: NURSING, REHAB OR LONG-TERM CARE | End: 2018-10-18
Attending: GENERAL ACUTE CARE HOSPITAL | Admitting: GENERAL ACUTE CARE HOSPITAL
Payer: COMMERCIAL

## 2018-10-14 VITALS
DIASTOLIC BLOOD PRESSURE: 97 MMHG | WEIGHT: 179.9 LBS | RESPIRATION RATE: 18 BRPM | HEART RATE: 66 BPM | OXYGEN SATURATION: 97 % | HEIGHT: 66 IN | SYSTOLIC BLOOD PRESSURE: 160 MMHG | TEMPERATURE: 98 F

## 2018-10-14 LAB
ALBUMIN SERPL ELPH-MCNC: 2.2 G/DL — LOW (ref 3.3–5)
ALP SERPL-CCNC: 305 U/L — HIGH (ref 40–120)
ALT FLD-CCNC: 19 U/L — SIGNIFICANT CHANGE UP (ref 12–78)
ANION GAP SERPL CALC-SCNC: 11 MMOL/L — SIGNIFICANT CHANGE UP (ref 5–17)
APTT BLD: 29 SEC — SIGNIFICANT CHANGE UP (ref 27.5–37.4)
AST SERPL-CCNC: 42 U/L — HIGH (ref 15–37)
BASOPHILS # BLD AUTO: 0.06 K/UL — SIGNIFICANT CHANGE UP (ref 0–0.2)
BASOPHILS NFR BLD AUTO: 0.6 % — SIGNIFICANT CHANGE UP (ref 0–2)
BILIRUB SERPL-MCNC: 0.5 MG/DL — SIGNIFICANT CHANGE UP (ref 0.2–1.2)
BUN SERPL-MCNC: 15 MG/DL — SIGNIFICANT CHANGE UP (ref 7–23)
CALCIUM SERPL-MCNC: 8.6 MG/DL — SIGNIFICANT CHANGE UP (ref 8.5–10.1)
CHLORIDE SERPL-SCNC: 92 MMOL/L — LOW (ref 96–108)
CK MB CFR SERPL CALC: <1 NG/ML — SIGNIFICANT CHANGE UP (ref 0.5–3.6)
CO2 SERPL-SCNC: 27 MMOL/L — SIGNIFICANT CHANGE UP (ref 22–31)
CREAT SERPL-MCNC: 1.24 MG/DL — SIGNIFICANT CHANGE UP (ref 0.5–1.3)
EOSINOPHIL # BLD AUTO: 0.39 K/UL — SIGNIFICANT CHANGE UP (ref 0–0.5)
EOSINOPHIL NFR BLD AUTO: 3.8 % — SIGNIFICANT CHANGE UP (ref 0–6)
GLUCOSE SERPL-MCNC: 170 MG/DL — HIGH (ref 70–99)
HCT VFR BLD CALC: 34.7 % — SIGNIFICANT CHANGE UP (ref 34.5–45)
HGB BLD-MCNC: 11.2 G/DL — LOW (ref 11.5–15.5)
IMM GRANULOCYTES NFR BLD AUTO: 0.3 % — SIGNIFICANT CHANGE UP (ref 0–1.5)
INR BLD: 1.01 RATIO — SIGNIFICANT CHANGE UP (ref 0.88–1.16)
LYMPHOCYTES # BLD AUTO: 2.71 K/UL — SIGNIFICANT CHANGE UP (ref 1–3.3)
LYMPHOCYTES # BLD AUTO: 26.1 % — SIGNIFICANT CHANGE UP (ref 13–44)
MCHC RBC-ENTMCNC: 25.2 PG — LOW (ref 27–34)
MCHC RBC-ENTMCNC: 32.3 GM/DL — SIGNIFICANT CHANGE UP (ref 32–36)
MCV RBC AUTO: 78 FL — LOW (ref 80–100)
MONOCYTES # BLD AUTO: 0.81 K/UL — SIGNIFICANT CHANGE UP (ref 0–0.9)
MONOCYTES NFR BLD AUTO: 7.8 % — SIGNIFICANT CHANGE UP (ref 2–14)
NEUTROPHILS # BLD AUTO: 6.37 K/UL — SIGNIFICANT CHANGE UP (ref 1.8–7.4)
NEUTROPHILS NFR BLD AUTO: 61.4 % — SIGNIFICANT CHANGE UP (ref 43–77)
PLATELET # BLD AUTO: 275 K/UL — SIGNIFICANT CHANGE UP (ref 150–400)
POTASSIUM SERPL-MCNC: 4.3 MMOL/L — SIGNIFICANT CHANGE UP (ref 3.5–5.3)
POTASSIUM SERPL-SCNC: 4.3 MMOL/L — SIGNIFICANT CHANGE UP (ref 3.5–5.3)
PROT SERPL-MCNC: 6.6 GM/DL — SIGNIFICANT CHANGE UP (ref 6–8.3)
PROTHROM AB SERPL-ACNC: 11 SEC — SIGNIFICANT CHANGE UP (ref 9.8–12.7)
RBC # BLD: 4.45 M/UL — SIGNIFICANT CHANGE UP (ref 3.8–5.2)
RBC # FLD: 16.2 % — HIGH (ref 10.3–14.5)
SODIUM SERPL-SCNC: 130 MMOL/L — LOW (ref 135–145)
TROPONIN I SERPL-MCNC: <.015 NG/ML — SIGNIFICANT CHANGE UP (ref 0.01–0.04)
WBC # BLD: 10.37 K/UL — SIGNIFICANT CHANGE UP (ref 3.8–10.5)
WBC # FLD AUTO: 10.37 K/UL — SIGNIFICANT CHANGE UP (ref 3.8–10.5)

## 2018-10-14 PROCEDURE — 99285 EMERGENCY DEPT VISIT HI MDM: CPT

## 2018-10-14 PROCEDURE — 70450 CT HEAD/BRAIN W/O DYE: CPT | Mod: 26

## 2018-10-14 PROCEDURE — 93010 ELECTROCARDIOGRAM REPORT: CPT

## 2018-10-14 PROCEDURE — 99223 1ST HOSP IP/OBS HIGH 75: CPT

## 2018-10-14 PROCEDURE — 71045 X-RAY EXAM CHEST 1 VIEW: CPT | Mod: 26

## 2018-10-14 NOTE — ED ADULT NURSE NOTE - PMH
Asthma    Atrial fibrillation    Diabetes    DM (diabetes mellitus)    HLD (hyperlipidemia)    HTN (hypertension)    Hypercholesteremia    Hypertension    Hypothyroid    Hypothyroidism

## 2018-10-14 NOTE — H&P ADULT - ASSESSMENT
Vital Signs Last 24 Hrs  T(C): 36.8 (15 Oct 2018 02:11), Max: 37.1 (14 Oct 2018 22:47)  T(F): 98.2 (15 Oct 2018 02:11), Max: 98.8 (14 Oct 2018 22:47)  HR: 63 (15 Oct 2018 02:11) (63 - 67)  BP: 156/75 (15 Oct 2018 02:11) (145/68 - 160/97)  BP(mean): --  RR: 17 (15 Oct 2018 02:11) (17 - 18)  SpO2: 100% (15 Oct 2018 02:11) (97% - 100%)          LABS:                        11.2   10.37 )-----------( 275      ( 14 Oct 2018 22:03 )             34.7     10-14    130<L>  |  92<L>  |  15  ----------------------------<  170<H>  4.3   |  27  |  1.24    Ca    8.6      14 Oct 2018 22:03    TPro  6.6  /  Alb  2.2<L>  /  TBili  0.5  /  DBili  x   /  AST  42<H>  /  ALT  19  /  AlkPhos  305<H>  10-14    PT/INR - ( 14 Oct 2018 22:03 )   PT: 11.0 sec;   INR: 1.01 ratio         PTT - ( 14 Oct 2018 22:03 )  PTT:29.0 sec  Urinalysis Basic - ( 15 Oct 2018 00:50 )    Color: Yellow / Appearance: Slightly Turbid / S.010 / pH: x  Gluc: x / Ketone: Negative  / Bili: Negative / Urobili: Negative mg/dL   Blood: x / Protein: 100 mg/dL / Nitrite: Negative   Leuk Esterase: Moderate / RBC: 11-25 /HPF / WBC >50   Sq Epi: x / Non Sq Epi: Few / Bacteria: Moderate        RADIOLOGY & ADDITIONAL STUDIES:    CT head:  IMPRESSION:   No acute intracranial bleeding, mass effect, or shift.   Multiple chronic infarctions and chronic chronic microvascular ischemic   changes.

## 2018-10-14 NOTE — ED PROVIDER NOTE - PHYSICAL EXAMINATION
NIHSS: 5 (not answering questions, mild facial droop, difficulty speaking)  Gen: No acute distress, non toxic  HEENT: Mucous membranes moist, pink conjunctivae, EOMI  CV: RRR, nl s1/s2.  Resp: CTAB, normal rate and effort  GI: Abdomen soft, NT, ND. No rebound, no guarding  : No CVAT  Neuro: A&O x 3, moving all 4 extremities. awake, tracks and follows commands, nl nasolabial fold but family reports slight droop compared to normal. can b/l UE off bed but with weakness.   MSK: No spine or joint tenderness to palpation  Skin: No rashes. intact and perfused.

## 2018-10-14 NOTE — H&P ADULT - NSHPREVIEWOFSYSTEMS_GEN_ALL_CORE
No fever/chills, No photophobia/eye pain/changes in vision,  No chest pain/palpitations, no SOB/cough/wheeze/stridor, No abdominal pain, No N/V/D, no dysuria/frequency/discharge, No neck/back pain, no rash, no changes in neurological status/function, though patient possibly not the most reliable historian.

## 2018-10-14 NOTE — ED PROVIDER NOTE - OBJECTIVE STATEMENT
89 y/o  female with past medical history of DM2, afib on eliquis HTN, HLD, Hypothyroidism, Asthma, recent stroke with mild right residual effects present with ~2 days of change in mental status and ?possible left facial droop. Grandchildren (who live with pt) state similar presentation with stroke. Usually talkative, has been talking less and less. No other focal symptoms. Pt following some commands but not speaking/providing much history.     no ros given mental status.

## 2018-10-14 NOTE — H&P ADULT - HISTORY OF PRESENT ILLNESS
Pt to be admitted for possible CVA, in progress. 89 year old woman with PMH of DM2, HTN, HLD, Hypothyroidism, Asthma presents with family brought in for concern of acute change in mentation. 89 year old woman with PMH of DM2, HTN, HLD, Hypothyroidism, Asthma presents with family brought in for concern of acute change in mentation given CVA over summer.  Pt providing limited history only reporting "I feel fine"; AAO x 1, normally x 3.  No witnessed slurred speech, facial asymmetry.  No other complaints at this time.    In the ED, work up including CT head only remarkable for UA consistent with UTI. 89 year old woman with PMH of DM2, HTN, HLD, Hypothyroidism, Asthma, Afib on Eliquis presents with family brought in for concern of acute change in mentation given CVA over summer.  Pt providing limited history only reporting "I feel fine"; AAO x 1, normally x 3.  No witnessed slurred speech, facial asymmetry.  No other complaints at this time.    In the ED, work up including CT head only remarkable for UA consistent with UTI. 89 year old woman with PMH of DM2, HTN, HLD, Hypothyroidism, Asthma, Afib on Eliquis presents with family brought in for concern of acute change in mentation given CVA over  this past summer.  Pt providing limited history.  Family not present at time of encounter, as per ED attending who spoke with family she is much more confused than she normally is-she is confused at baseline.  No witnessed slurred speech, facial asymmetry.  No other complaints at this time.    In the ED, work up including CT head only remarkable for UA consistent with UTI.

## 2018-10-14 NOTE — ED PROVIDER NOTE - CARE PLAN
Principal Discharge DX:	Altered mental status, unspecified Principal Discharge DX:	UTI (urinary tract infection)  Secondary Diagnosis:	Altered mental status

## 2018-10-14 NOTE — ED ADULT NURSE NOTE - NSIMPLEMENTINTERV_GEN_ALL_ED
Implemented All Fall Risk Interventions:  [place] to call system. Call bell, personal items and telephone within reach. Instruct patient to call for assistance. Room bathroom lighting operational. Non-slip footwear when patient is off stretcher. Physically safe environment: no spills, clutter or unnecessary equipment. Stretcher in lowest position, wheels locked, appropriate side rails in place. Provide visual cue, wrist band, yellow gown, etc. Monitor gait and stability. Monitor for mental status changes and reorient to person, place, and time. Review medications for side effects contributing to fall risk. Reinforce activity limits and safety measures with patient and family.

## 2018-10-14 NOTE — H&P ADULT - PROBLEM SELECTOR PLAN 2
Continue Rocephin 1gm IVPB Q Daily.  Sent Urine Cx, Blood Cx 2 sets  Start IV Fluids  NS@80ml/hr for 24 hrs, then re-evaluate.  Tylenol 650mg po q6hrs PRN for fever.

## 2018-10-14 NOTE — H&P ADULT - NSHPPHYSICALEXAM_GEN_ALL_CORE
GENERAL: NAD, well-groomed, well-developed  HEAD:  Atraumatic, Normocephalic  EYES: EOMI, PERRLA, conjunctiva and sclera clear  ENMT: No tonsillar erythema, exudates, or enlargement; Moist mucous membranes, No lesions  NECK: Supple, No JVD, Normal thyroid  NERVOUS SYSTEM:  Alert & Oriented X1, CN 2-12 intact, strength 4/5 right side upper and lower, 5/5 left  CHEST/LUNG: Clear to ascultation bilaterally; No rales, rhonchi, wheezing, or rubs  HEART: Regular rate and rhythm; No murmurs, rubs, or gallops  ABDOMEN: Soft, Nontender, Nondistended; Bowel sounds present  EXTREMITIES: no clubbing, cyanosis, or edema  SKIN: no rashes or lesions  PSYCH: normal affect and behavior

## 2018-10-14 NOTE — ED PROVIDER NOTE - MEDICAL DECISION MAKING DETAILS
pt not speaking, appears generalized weak, unclear if neurologic. outside of window for any tpa treatment. r/oinfection/metabolic. possile stroke. labs, cth, urine, admit and neuro consult/mri.

## 2018-10-15 DIAGNOSIS — N30.00 ACUTE CYSTITIS WITHOUT HEMATURIA: ICD-10-CM

## 2018-10-15 DIAGNOSIS — E03.9 HYPOTHYROIDISM, UNSPECIFIED: ICD-10-CM

## 2018-10-15 DIAGNOSIS — Z29.9 ENCOUNTER FOR PROPHYLACTIC MEASURES, UNSPECIFIED: ICD-10-CM

## 2018-10-15 DIAGNOSIS — J45.30 MILD PERSISTENT ASTHMA, UNCOMPLICATED: ICD-10-CM

## 2018-10-15 DIAGNOSIS — Z86.73 PERSONAL HISTORY OF TRANSIENT ISCHEMIC ATTACK (TIA), AND CEREBRAL INFARCTION WITHOUT RESIDUAL DEFICITS: ICD-10-CM

## 2018-10-15 DIAGNOSIS — E11.9 TYPE 2 DIABETES MELLITUS WITHOUT COMPLICATIONS: ICD-10-CM

## 2018-10-15 DIAGNOSIS — I48.0 PAROXYSMAL ATRIAL FIBRILLATION: ICD-10-CM

## 2018-10-15 DIAGNOSIS — I10 ESSENTIAL (PRIMARY) HYPERTENSION: ICD-10-CM

## 2018-10-15 DIAGNOSIS — G93.41 METABOLIC ENCEPHALOPATHY: ICD-10-CM

## 2018-10-15 DIAGNOSIS — E78.2 MIXED HYPERLIPIDEMIA: ICD-10-CM

## 2018-10-15 LAB
ANION GAP SERPL CALC-SCNC: 10 MMOL/L — SIGNIFICANT CHANGE UP (ref 5–17)
APPEARANCE UR: ABNORMAL
BASOPHILS # BLD AUTO: 0.06 K/UL — SIGNIFICANT CHANGE UP (ref 0–0.2)
BASOPHILS NFR BLD AUTO: 0.6 % — SIGNIFICANT CHANGE UP (ref 0–2)
BILIRUB UR-MCNC: NEGATIVE — SIGNIFICANT CHANGE UP
BUN SERPL-MCNC: 15 MG/DL — SIGNIFICANT CHANGE UP (ref 7–23)
CALCIUM SERPL-MCNC: 9.1 MG/DL — SIGNIFICANT CHANGE UP (ref 8.5–10.1)
CHLORIDE SERPL-SCNC: 95 MMOL/L — LOW (ref 96–108)
CO2 SERPL-SCNC: 28 MMOL/L — SIGNIFICANT CHANGE UP (ref 22–31)
COLOR SPEC: YELLOW — SIGNIFICANT CHANGE UP
CREAT SERPL-MCNC: 1.16 MG/DL — SIGNIFICANT CHANGE UP (ref 0.5–1.3)
DIFF PNL FLD: ABNORMAL
EOSINOPHIL # BLD AUTO: 0.49 K/UL — SIGNIFICANT CHANGE UP (ref 0–0.5)
EOSINOPHIL NFR BLD AUTO: 5.2 % — SIGNIFICANT CHANGE UP (ref 0–6)
GLUCOSE SERPL-MCNC: 173 MG/DL — HIGH (ref 70–99)
GLUCOSE UR QL: NEGATIVE MG/DL — SIGNIFICANT CHANGE UP
HCT VFR BLD CALC: 35.7 % — SIGNIFICANT CHANGE UP (ref 34.5–45)
HGB BLD-MCNC: 11.2 G/DL — LOW (ref 11.5–15.5)
IMM GRANULOCYTES NFR BLD AUTO: 0.2 % — SIGNIFICANT CHANGE UP (ref 0–1.5)
KETONES UR-MCNC: NEGATIVE — SIGNIFICANT CHANGE UP
LEUKOCYTE ESTERASE UR-ACNC: ABNORMAL
LYMPHOCYTES # BLD AUTO: 3.64 K/UL — HIGH (ref 1–3.3)
LYMPHOCYTES # BLD AUTO: 38.4 % — SIGNIFICANT CHANGE UP (ref 13–44)
MCHC RBC-ENTMCNC: 24.6 PG — LOW (ref 27–34)
MCHC RBC-ENTMCNC: 31.4 GM/DL — LOW (ref 32–36)
MCV RBC AUTO: 78.3 FL — LOW (ref 80–100)
MONOCYTES # BLD AUTO: 0.85 K/UL — SIGNIFICANT CHANGE UP (ref 0–0.9)
MONOCYTES NFR BLD AUTO: 9 % — SIGNIFICANT CHANGE UP (ref 2–14)
NEUTROPHILS # BLD AUTO: 4.42 K/UL — SIGNIFICANT CHANGE UP (ref 1.8–7.4)
NEUTROPHILS NFR BLD AUTO: 46.6 % — SIGNIFICANT CHANGE UP (ref 43–77)
NITRITE UR-MCNC: NEGATIVE — SIGNIFICANT CHANGE UP
PH UR: 5 — SIGNIFICANT CHANGE UP (ref 5–8)
PLATELET # BLD AUTO: 265 K/UL — SIGNIFICANT CHANGE UP (ref 150–400)
POTASSIUM SERPL-MCNC: 3.3 MMOL/L — LOW (ref 3.5–5.3)
POTASSIUM SERPL-SCNC: 3.3 MMOL/L — LOW (ref 3.5–5.3)
PROT UR-MCNC: 100 MG/DL
RBC # BLD: 4.56 M/UL — SIGNIFICANT CHANGE UP (ref 3.8–5.2)
RBC # FLD: 16.3 % — HIGH (ref 10.3–14.5)
SODIUM SERPL-SCNC: 133 MMOL/L — LOW (ref 135–145)
SP GR SPEC: 1.01 — SIGNIFICANT CHANGE UP (ref 1.01–1.02)
T4 FREE SERPL-MCNC: 1.6 NG/DL — SIGNIFICANT CHANGE UP (ref 0.9–1.8)
TSH SERPL-MCNC: 4.66 UU/ML — HIGH (ref 0.36–3.74)
UROBILINOGEN FLD QL: NEGATIVE MG/DL — SIGNIFICANT CHANGE UP
WBC # BLD: 9.48 K/UL — SIGNIFICANT CHANGE UP (ref 3.8–10.5)
WBC # FLD AUTO: 9.48 K/UL — SIGNIFICANT CHANGE UP (ref 3.8–10.5)

## 2018-10-15 PROCEDURE — 99233 SBSQ HOSP IP/OBS HIGH 50: CPT

## 2018-10-15 PROCEDURE — 70551 MRI BRAIN STEM W/O DYE: CPT | Mod: 26

## 2018-10-15 RX ORDER — GLUCAGON INJECTION, SOLUTION 0.5 MG/.1ML
1 INJECTION, SOLUTION SUBCUTANEOUS ONCE
Qty: 0 | Refills: 0 | Status: DISCONTINUED | OUTPATIENT
Start: 2018-10-15 | End: 2018-10-18

## 2018-10-15 RX ORDER — CEFTRIAXONE 500 MG/1
1 INJECTION, POWDER, FOR SOLUTION INTRAMUSCULAR; INTRAVENOUS EVERY 24 HOURS
Qty: 0 | Refills: 0 | Status: DISCONTINUED | OUTPATIENT
Start: 2018-10-15 | End: 2018-10-16

## 2018-10-15 RX ORDER — MONTELUKAST 4 MG/1
10 TABLET, CHEWABLE ORAL DAILY
Qty: 0 | Refills: 0 | Status: DISCONTINUED | OUTPATIENT
Start: 2018-10-15 | End: 2018-10-18

## 2018-10-15 RX ORDER — DEXTROSE 50 % IN WATER 50 %
15 SYRINGE (ML) INTRAVENOUS ONCE
Qty: 0 | Refills: 0 | Status: DISCONTINUED | OUTPATIENT
Start: 2018-10-15 | End: 2018-10-18

## 2018-10-15 RX ORDER — SODIUM CHLORIDE 9 MG/ML
1000 INJECTION, SOLUTION INTRAVENOUS
Qty: 0 | Refills: 0 | Status: DISCONTINUED | OUTPATIENT
Start: 2018-10-15 | End: 2018-10-18

## 2018-10-15 RX ORDER — ACETAMINOPHEN 500 MG
650 TABLET ORAL EVERY 6 HOURS
Qty: 0 | Refills: 0 | Status: DISCONTINUED | OUTPATIENT
Start: 2018-10-15 | End: 2018-10-18

## 2018-10-15 RX ORDER — LEVOTHYROXINE SODIUM 125 MCG
112 TABLET ORAL DAILY
Qty: 0 | Refills: 0 | Status: DISCONTINUED | OUTPATIENT
Start: 2018-10-15 | End: 2018-10-18

## 2018-10-15 RX ORDER — DEXTROSE 50 % IN WATER 50 %
12.5 SYRINGE (ML) INTRAVENOUS ONCE
Qty: 0 | Refills: 0 | Status: DISCONTINUED | OUTPATIENT
Start: 2018-10-15 | End: 2018-10-18

## 2018-10-15 RX ORDER — DEXTROSE 50 % IN WATER 50 %
25 SYRINGE (ML) INTRAVENOUS ONCE
Qty: 0 | Refills: 0 | Status: DISCONTINUED | OUTPATIENT
Start: 2018-10-15 | End: 2018-10-18

## 2018-10-15 RX ORDER — ATORVASTATIN CALCIUM 80 MG/1
80 TABLET, FILM COATED ORAL AT BEDTIME
Qty: 0 | Refills: 0 | Status: DISCONTINUED | OUTPATIENT
Start: 2018-10-15 | End: 2018-10-18

## 2018-10-15 RX ORDER — INSULIN LISPRO 100/ML
VIAL (ML) SUBCUTANEOUS
Qty: 0 | Refills: 0 | Status: DISCONTINUED | OUTPATIENT
Start: 2018-10-15 | End: 2018-10-18

## 2018-10-15 RX ORDER — POTASSIUM CHLORIDE 20 MEQ
40 PACKET (EA) ORAL EVERY 4 HOURS
Qty: 0 | Refills: 0 | Status: COMPLETED | OUTPATIENT
Start: 2018-10-15 | End: 2018-10-15

## 2018-10-15 RX ORDER — SODIUM CHLORIDE 9 MG/ML
1000 INJECTION INTRAMUSCULAR; INTRAVENOUS; SUBCUTANEOUS
Qty: 0 | Refills: 0 | Status: DISCONTINUED | OUTPATIENT
Start: 2018-10-15 | End: 2018-10-17

## 2018-10-15 RX ORDER — SODIUM CHLORIDE 9 MG/ML
1000 INJECTION INTRAMUSCULAR; INTRAVENOUS; SUBCUTANEOUS
Qty: 0 | Refills: 0 | Status: DISCONTINUED | OUTPATIENT
Start: 2018-10-15 | End: 2018-10-15

## 2018-10-15 RX ORDER — INSULIN GLARGINE 100 [IU]/ML
10 INJECTION, SOLUTION SUBCUTANEOUS AT BEDTIME
Qty: 0 | Refills: 0 | Status: DISCONTINUED | OUTPATIENT
Start: 2018-10-15 | End: 2018-10-18

## 2018-10-15 RX ORDER — APIXABAN 2.5 MG/1
2.5 TABLET, FILM COATED ORAL EVERY 12 HOURS
Qty: 0 | Refills: 0 | Status: DISCONTINUED | OUTPATIENT
Start: 2018-10-15 | End: 2018-10-18

## 2018-10-15 RX ORDER — INSULIN LISPRO 100/ML
VIAL (ML) SUBCUTANEOUS AT BEDTIME
Qty: 0 | Refills: 0 | Status: DISCONTINUED | OUTPATIENT
Start: 2018-10-15 | End: 2018-10-16

## 2018-10-15 RX ORDER — CEFTRIAXONE 500 MG/1
1 INJECTION, POWDER, FOR SOLUTION INTRAMUSCULAR; INTRAVENOUS EVERY 24 HOURS
Qty: 0 | Refills: 0 | Status: DISCONTINUED | OUTPATIENT
Start: 2018-10-16 | End: 2018-10-16

## 2018-10-15 RX ORDER — AMLODIPINE BESYLATE 2.5 MG/1
10 TABLET ORAL DAILY
Qty: 0 | Refills: 0 | Status: DISCONTINUED | OUTPATIENT
Start: 2018-10-15 | End: 2018-10-18

## 2018-10-15 RX ORDER — BUDESONIDE AND FORMOTEROL FUMARATE DIHYDRATE 160; 4.5 UG/1; UG/1
2 AEROSOL RESPIRATORY (INHALATION)
Qty: 0 | Refills: 0 | Status: DISCONTINUED | OUTPATIENT
Start: 2018-10-15 | End: 2018-10-18

## 2018-10-15 RX ADMIN — INSULIN GLARGINE 10 UNIT(S): 100 INJECTION, SOLUTION SUBCUTANEOUS at 21:14

## 2018-10-15 RX ADMIN — SODIUM CHLORIDE 80 MILLILITER(S): 9 INJECTION INTRAMUSCULAR; INTRAVENOUS; SUBCUTANEOUS at 08:20

## 2018-10-15 RX ADMIN — SODIUM CHLORIDE 85 MILLILITER(S): 9 INJECTION INTRAMUSCULAR; INTRAVENOUS; SUBCUTANEOUS at 15:12

## 2018-10-15 RX ADMIN — BUDESONIDE AND FORMOTEROL FUMARATE DIHYDRATE 2 PUFF(S): 160; 4.5 AEROSOL RESPIRATORY (INHALATION) at 06:21

## 2018-10-15 RX ADMIN — Medication 112 MICROGRAM(S): at 06:19

## 2018-10-15 RX ADMIN — APIXABAN 2.5 MILLIGRAM(S): 2.5 TABLET, FILM COATED ORAL at 17:14

## 2018-10-15 RX ADMIN — MONTELUKAST 10 MILLIGRAM(S): 4 TABLET, CHEWABLE ORAL at 12:26

## 2018-10-15 RX ADMIN — SODIUM CHLORIDE 85 MILLILITER(S): 9 INJECTION INTRAMUSCULAR; INTRAVENOUS; SUBCUTANEOUS at 21:14

## 2018-10-15 RX ADMIN — APIXABAN 2.5 MILLIGRAM(S): 2.5 TABLET, FILM COATED ORAL at 06:19

## 2018-10-15 RX ADMIN — Medication 40 MILLIEQUIVALENT(S): at 17:13

## 2018-10-15 RX ADMIN — Medication 4: at 08:18

## 2018-10-15 RX ADMIN — ATORVASTATIN CALCIUM 80 MILLIGRAM(S): 80 TABLET, FILM COATED ORAL at 21:12

## 2018-10-15 RX ADMIN — Medication 2: at 17:14

## 2018-10-15 RX ADMIN — CEFTRIAXONE 100 GRAM(S): 500 INJECTION, POWDER, FOR SOLUTION INTRAMUSCULAR; INTRAVENOUS at 01:57

## 2018-10-15 RX ADMIN — AMLODIPINE BESYLATE 10 MILLIGRAM(S): 2.5 TABLET ORAL at 06:20

## 2018-10-15 NOTE — PROGRESS NOTE ADULT - ASSESSMENT
89 year old woman with PMH of DM2, HTN, HLD, Hypothyroidism, Asthma, A. fib on Eliquis presents with family brought in for concern of acute change in mentation.  Granddaughter at bedside. States that 3 days prior to admission patient had a change in behavior where she would sit and not talk but just stare at the wall. Patient has confusion at baseline but she is very chatty.  Because she did not improve they brought her to the ED.     of note: Patient was admitted 7/2018 with CVA.     Granddaughter -Christina [phone]    #Possibly Acute metabolic Encephalopathy, secondary to UTI? vs new CVA   -Patient afebrile, hemodynamically stable.   -+leukocytosis, possibly reactive?   -IVF hydration  -c/w Ceftriaxone   -Follow urine Cx   -Thyroid Stimulating Hormone, Serum: 4.660 uU/mL (10.15.18 @ 07:31)  -will send free T4   -will obtain MRI    #DM2, uncontrolled  -Hemoglobin A1C, 9.6%  -ISS, premeal lispro, lantus at bedtime   -CHO diet     #HTN, controlled  -c/w amlodipine     #HLD, not on a statin at home (per pharmacy no Rx)  -c/w lipitor     #Hypothyroidism  -follow fT4  -C/w Synthroid     #Mild persistent asthma, not in exacerbation   -continue with albuterol prn, symbicort     #Paroxysmal A. fib on Eliquis   -Rate control with Carvedilol    #History of CVA, not on ASA or Statin at home?    #Preventative measures,   DVT ppx -Eliquis

## 2018-10-15 NOTE — PROGRESS NOTE ADULT - SUBJECTIVE AND OBJECTIVE BOX
Patient is a 89y old  Female who presents with a chief complaint of AMS (14 Oct 2018 23:55)        HPI:  89 year old woman with PMH of DM2, HTN, HLD, Hypothyroidism, Asthma, Afib on Eliquis presents with family brought in for concern of acute change in mentation given CVA over  this past summer.  Pt providing limited history.  Family not present at time of encounter, as per ED attending who spoke with family she is much more confused than she normally is-she is confused at baseline.  No witnessed slurred speech, facial asymmetry.  No other complaints at this time.    In the ED, work up including CT head only remarkable for UA consistent with UTI. (14 Oct 2018 23:55)      SUBJECTIVE & OBJECTIVE: Pt seen and examined at bedside.     PHYSICAL EXAM:  T(C): 36.3 (10-16-18 @ 05:34), Max: 36.3 (10-16-18 @ 05:34)  HR: 75 (10-16-18 @ 05:34) (62 - 75)  BP: 180/77 (10-16-18 @ 05:34) (154/79 - 180/77)  RR: 18 (10-16-18 @ 05:34) (16 - 18)  SpO2: 98% (10-16-18 @ 05:34) (98% - 99%)  Wt(kg): --   I&O's Detail    15 Oct 2018 07:01  -  16 Oct 2018 05:44  --------------------------------------------------------  IN:    Oral Fluid: 110 mL  Total IN: 110 mL    OUT:  Total OUT: 0 mL    Total NET: 110 mL        GENERAL: NAD, well-groomed, well-developed  HEAD:  Atraumatic, Normocephalic  EYES: EOMI, PERRLA, conjunctiva and sclera clear  ENMT: Moist mucous membranes  NECK: Supple, No JVD  NERVOUS SYSTEM:  Alert & Oriented X3, Motor Strength 5/5 B/L upper and lower extremities; DTRs 2+ intact and symmetric  CHEST/LUNG: Clear to auscultation bilaterally; No rales, rhonchi, wheezing, or rubs  HEART: Regular rate and rhythm; No murmurs, rubs, or gallops  ABDOMEN: Soft, Nontender, Nondistended; Bowel sounds present  EXTREMITIES:  2+ Peripheral Pulses, No clubbing, cyanosis, or edema    MEDICATIONS  (STANDING):  amLODIPine   Tablet 10 milliGRAM(s) Oral daily  apixaban 2.5 milliGRAM(s) Oral every 12 hours  atorvastatin 80 milliGRAM(s) Oral at bedtime  buDESOnide 160 MICROgram(s)/formoterol 4.5 MICROgram(s) Inhaler 2 Puff(s) Inhalation two times a day  cefTRIAXone   IVPB 1 Gram(s) IV Intermittent every 24 hours  cefTRIAXone   IVPB 1 Gram(s) IV Intermittent every 24 hours  dextrose 5%. 1000 milliLiter(s) (50 mL/Hr) IV Continuous <Continuous>  dextrose 50% Injectable 12.5 Gram(s) IV Push once  dextrose 50% Injectable 25 Gram(s) IV Push once  dextrose 50% Injectable 25 Gram(s) IV Push once  insulin glargine Injectable (LANTUS) 10 Unit(s) SubCutaneous at bedtime  insulin lispro (HumaLOG) corrective regimen sliding scale   SubCutaneous three times a day before meals  insulin lispro (HumaLOG) corrective regimen sliding scale   SubCutaneous at bedtime  levothyroxine 112 MICROGram(s) Oral daily  montelukast 10 milliGRAM(s) Oral daily  sodium chloride 0.9%. 1000 milliLiter(s) (85 mL/Hr) IV Continuous <Continuous>    MEDICATIONS  (PRN):  acetaminophen   Tablet .. 650 milliGRAM(s) Oral every 6 hours PRN Temp greater or equal to 38C (100.4F), Mild Pain (1 - 3)  dextrose 40% Gel 15 Gram(s) Oral once PRN Blood Glucose LESS THAN 70 milliGRAM(s)/deciliter  glucagon  Injectable 1 milliGRAM(s) IntraMuscular once PRN Glucose LESS THAN 70 milligrams/deciliter      LABS:                        11.2   9.48  )-----------( 265      ( 15 Oct 2018 07:31 )             35.7     10-15    133<L>  |  95<L>  |  15  ----------------------------<  173<H>  3.3<L>   |  28  |  1.16    Ca    9.1      15 Oct 2018 07:31    TPro  6.6  /  Alb  2.2<L>  /  TBili  0.5  /  DBili  x   /  AST  42<H>  /  ALT  19  /  AlkPhos  305<H>  10-14    PT/INR - ( 14 Oct 2018 22:03 )   PT: 11.0 sec;   INR: 1.01 ratio         PTT - ( 14 Oct 2018 22:03 )  PTT:29.0 sec  Urinalysis Basic - ( 15 Oct 2018 00:50 )    Color: Yellow / Appearance: Slightly Turbid / S.010 / pH: x  Gluc: x / Ketone: Negative  / Bili: Negative / Urobili: Negative mg/dL   Blood: x / Protein: 100 mg/dL / Nitrite: Negative   Leuk Esterase: Moderate / RBC: 11-25 /HPF / WBC >50   Sq Epi: x / Non Sq Epi: Few / Bacteria: Moderate        CAPILLARY BLOOD GLUCOSE      POCT Blood Glucose.: 191 mg/dL (15 Oct 2018 21:13)  POCT Blood Glucose.: 182 mg/dL (15 Oct 2018 16:56)  POCT Blood Glucose.: 117 mg/dL (15 Oct 2018 12:21)  POCT Blood Glucose.: 206 mg/dL (15 Oct 2018 08:17)      CARDIAC MARKERS ( 14 Oct 2018 22:03 )  <.015 ng/mL / x     / x     / x     / <1.0 ng/mL        RECENT CULTURES:      RADIOLOGY & ADDITIONAL TESTS:  Imaging Personally Reviewed:  [ ] YES  [ ] NO    Consultant(s) Notes Reviewed:  [ ] YES  [ ] NO    Care Discussed with Consultants/Other Providers [ ] YES  [ ] NO HPI:  89 year old woman with PMH of DM2, HTN, HLD, Hypothyroidism, Asthma, Afib on Eliquis presents with family brought in for concern of acute change in mentation given CVA over  this past summer.  Pt providing limited history.  Family not present at time of encounter, as per ED attending who spoke with family she is much more confused than she normally is-she is confused at baseline.  No witnessed slurred speech, facial asymmetry.  No other complaints at this time.         SUBJECTIVE & OBJECTIVE:   Pt seen and examined at bedside.   No overnight events.   Patient not speaking, unable to offer any complaints.       PHYSICAL EXAM:  Vitals stable.     GENERAL: NAD, sitting in bed, appears to be comfortable  HEENT: NCAT, PERRLA, EOMI, MMM  NERVOUS SYSTEM:  awake, not oriented. Not speaking, not following any commands. moving all extremities. Mild right flattening of right NLF, and mild right facial droop.   CHEST/LUNG: Clear to auscultation bilaterally; No rales, rhonchi, wheezing, or rubs  HEART: irregularly irregular, murmur at the bases   ABDOMEN: Soft, Nontender, Nondistended; +BS   EXTREMITIES:  2+ Peripheral Pulses, No clubbing, cyanosis, or edema b/l     MEDICATIONS  (STANDING):  amLODIPine   Tablet 10 milliGRAM(s) Oral daily  apixaban 2.5 milliGRAM(s) Oral every 12 hours  atorvastatin 80 milliGRAM(s) Oral at bedtime  buDESOnide 160 MICROgram(s)/formoterol 4.5 MICROgram(s) Inhaler 2 Puff(s) Inhalation two times a day  cefTRIAXone   IVPB 1 Gram(s) IV Intermittent every 24 hours  cefTRIAXone   IVPB 1 Gram(s) IV Intermittent every 24 hours  dextrose 5%. 1000 milliLiter(s) (50 mL/Hr) IV Continuous <Continuous>  dextrose 50% Injectable 12.5 Gram(s) IV Push once  dextrose 50% Injectable 25 Gram(s) IV Push once  dextrose 50% Injectable 25 Gram(s) IV Push once  insulin glargine Injectable (LANTUS) 10 Unit(s) SubCutaneous at bedtime  insulin lispro (HumaLOG) corrective regimen sliding scale   SubCutaneous three times a day before meals  insulin lispro (HumaLOG) corrective regimen sliding scale   SubCutaneous at bedtime  levothyroxine 112 MICROGram(s) Oral daily  montelukast 10 milliGRAM(s) Oral daily  sodium chloride 0.9%. 1000 milliLiter(s) (85 mL/Hr) IV Continuous <Continuous>    MEDICATIONS  (PRN):  acetaminophen   Tablet .. 650 milliGRAM(s) Oral every 6 hours PRN Temp greater or equal to 38C (100.4F), Mild Pain (1 - 3)  dextrose 40% Gel 15 Gram(s) Oral once PRN Blood Glucose LESS THAN 70 milliGRAM(s)/deciliter  glucagon  Injectable 1 milliGRAM(s) IntraMuscular once PRN Glucose LESS THAN 70 milligrams/deciliter      LABS:                        11.2   9.48  )-----------( 265      ( 15 Oct 2018 07:31 )             35.7     10-15    133<L>  |  95<L>  |  15  ----------------------------<  173<H>  3.3<L>   |  28  |  1.16    Ca    9.1      15 Oct 2018 07:31    TPro  6.6  /  Alb  2.2<L>  /  TBili  0.5  /  DBili  x   /  AST  42<H>  /  ALT  19  /  AlkPhos  305<H>  10-14    PT/INR - ( 14 Oct 2018 22:03 )   PT: 11.0 sec;   INR: 1.01 ratio         PTT - ( 14 Oct 2018 22:03 )  PTT:29.0 sec  Urinalysis Basic - ( 15 Oct 2018 00:50 )    Color: Yellow / Appearance: Slightly Turbid / S.010 / pH: x  Gluc: x / Ketone: Negative  / Bili: Negative / Urobili: Negative mg/dL   Blood: x / Protein: 100 mg/dL / Nitrite: Negative   Leuk Esterase: Moderate / RBC: 11-25 /HPF / WBC >50   Sq Epi: x / Non Sq Epi: Few / Bacteria: Moderate        CAPILLARY BLOOD GLUCOSE  POCT Blood Glucose.: 191 mg/dL (15 Oct 2018 21:13)  POCT Blood Glucose.: 182 mg/dL (15 Oct 2018 16:56)  POCT Blood Glucose.: 117 mg/dL (15 Oct 2018 12:21)  POCT Blood Glucose.: 206 mg/dL (15 Oct 2018 08:17)      CARDIAC MARKERS ( 14 Oct 2018 22:03 )  <.015 ng/mL / x     / x     / x     / <1.0 ng/mL        < from: CT Brain Stroke Protocol (10.14.18 @ 22:13) >  IMPRESSION:     No acute intracranial bleeding, mass effect, or shift.     Multiple chronic infarctions and chronic chronic microvascular ischemic   changes.    < end of copied text >    < from: MR Head No Cont (18 @ 09:56) >  Impression:    Brain MRI: Acute infarct in the distribution of the right anterior   cerebral artery.  Probable subacute infarct in the left corona radiata.    Neck MRA: Significantly limited study however no gross high-grade   stenosis is identified.    Brain MRA: Multifocal narrowing of the right anterior cerebral artery   with poor visualization of the distal segment.  Multifocal M1 segment narrowing likely on the basis of atherosclerotic   disease.    < end of copied text >      < from: Transthoracic Echocardiogram (18 @ 08:27) >  Conclusions:  1. Normal left ventricular internal dimensions and wall  thickness.  2. Normal left ventricular systolic function. No segmental  wall motion abnormalities.  3. Mild diastolic dysfunction (Stage I).  4. Normal rightventricular size and function.  5. Estimated pulmonary artery systolic pressure equals 44  mm Hg, assuming right atrial pressure equals 8 mm Hg,  consistent with mild pulmonary pressures.    < end of copied text >        RADIOLOGY & ADDITIONAL TESTS:  Imaging Personally Reviewed:  [ x] YES  [ ] NO    Consultant(s) Notes Reviewed:  [x ] YES  [ ] NO    Care Discussed with Consultants/Other Providers [ x] YES  [ ] NO

## 2018-10-16 ENCOUNTER — TRANSCRIPTION ENCOUNTER (OUTPATIENT)
Age: 83
End: 2018-10-16

## 2018-10-16 LAB
ANION GAP SERPL CALC-SCNC: 9 MMOL/L — SIGNIFICANT CHANGE UP (ref 5–17)
BUN SERPL-MCNC: 13 MG/DL — SIGNIFICANT CHANGE UP (ref 7–23)
CALCIUM SERPL-MCNC: 8.9 MG/DL — SIGNIFICANT CHANGE UP (ref 8.5–10.1)
CHLORIDE SERPL-SCNC: 101 MMOL/L — SIGNIFICANT CHANGE UP (ref 96–108)
CHOLEST SERPL-MCNC: 186 MG/DL — SIGNIFICANT CHANGE UP (ref 10–199)
CO2 SERPL-SCNC: 26 MMOL/L — SIGNIFICANT CHANGE UP (ref 22–31)
CREAT SERPL-MCNC: 1.13 MG/DL — SIGNIFICANT CHANGE UP (ref 0.5–1.3)
CULTURE RESULTS: SIGNIFICANT CHANGE UP
FERRITIN SERPL-MCNC: 482 NG/ML — HIGH (ref 15–150)
GLUCOSE SERPL-MCNC: 166 MG/DL — HIGH (ref 70–99)
HBA1C BLD-MCNC: 9.6 % — HIGH (ref 4–5.6)
HCT VFR BLD CALC: 34.5 % — SIGNIFICANT CHANGE UP (ref 34.5–45)
HDLC SERPL-MCNC: 48 MG/DL — LOW
HGB BLD-MCNC: 10.9 G/DL — LOW (ref 11.5–15.5)
IRON SATN MFR SERPL: 19 % — SIGNIFICANT CHANGE UP (ref 14–50)
IRON SATN MFR SERPL: 42 UG/DL — SIGNIFICANT CHANGE UP (ref 30–160)
LIPID PNL WITH DIRECT LDL SERPL: 118 MG/DL — SIGNIFICANT CHANGE UP
MCHC RBC-ENTMCNC: 25 PG — LOW (ref 27–34)
MCHC RBC-ENTMCNC: 31.6 GM/DL — LOW (ref 32–36)
MCV RBC AUTO: 79.1 FL — LOW (ref 80–100)
NRBC # BLD: 0 /100 WBCS — SIGNIFICANT CHANGE UP (ref 0–0)
PLATELET # BLD AUTO: 285 K/UL — SIGNIFICANT CHANGE UP (ref 150–400)
POTASSIUM SERPL-MCNC: 3.9 MMOL/L — SIGNIFICANT CHANGE UP (ref 3.5–5.3)
POTASSIUM SERPL-SCNC: 3.9 MMOL/L — SIGNIFICANT CHANGE UP (ref 3.5–5.3)
RBC # BLD: 4.36 M/UL — SIGNIFICANT CHANGE UP (ref 3.8–5.2)
RBC # FLD: 16.7 % — HIGH (ref 10.3–14.5)
SODIUM SERPL-SCNC: 136 MMOL/L — SIGNIFICANT CHANGE UP (ref 135–145)
SPECIMEN SOURCE: SIGNIFICANT CHANGE UP
TIBC SERPL-MCNC: 216 UG/DL — LOW (ref 220–430)
TOTAL CHOLESTEROL/HDL RATIO MEASUREMENT: 3.9 RATIO — SIGNIFICANT CHANGE UP (ref 3.3–7.1)
TRIGL SERPL-MCNC: 102 MG/DL — SIGNIFICANT CHANGE UP (ref 10–149)
UIBC SERPL-MCNC: 174 UG/DL — SIGNIFICANT CHANGE UP (ref 110–370)
WBC # BLD: 11.28 K/UL — HIGH (ref 3.8–10.5)
WBC # FLD AUTO: 11.28 K/UL — HIGH (ref 3.8–10.5)

## 2018-10-16 PROCEDURE — 99233 SBSQ HOSP IP/OBS HIGH 50: CPT

## 2018-10-16 RX ORDER — INSULIN LISPRO 100/ML
3 VIAL (ML) SUBCUTANEOUS
Qty: 0 | Refills: 0 | Status: DISCONTINUED | OUTPATIENT
Start: 2018-10-16 | End: 2018-10-18

## 2018-10-16 RX ORDER — ASPIRIN/CALCIUM CARB/MAGNESIUM 324 MG
1 TABLET ORAL
Qty: 30 | Refills: 0
Start: 2018-10-16

## 2018-10-16 RX ORDER — MONTELUKAST 4 MG/1
1 TABLET, CHEWABLE ORAL
Qty: 0 | Refills: 0 | COMMUNITY

## 2018-10-16 RX ORDER — ASPIRIN/CALCIUM CARB/MAGNESIUM 324 MG
81 TABLET ORAL DAILY
Qty: 0 | Refills: 0 | Status: DISCONTINUED | OUTPATIENT
Start: 2018-10-16 | End: 2018-10-18

## 2018-10-16 RX ORDER — ATORVASTATIN CALCIUM 80 MG/1
1 TABLET, FILM COATED ORAL
Qty: 30 | Refills: 0 | OUTPATIENT
Start: 2018-10-16

## 2018-10-16 RX ORDER — MONTELUKAST 4 MG/1
1 TABLET, CHEWABLE ORAL
Qty: 0 | Refills: 0 | DISCHARGE
Start: 2018-10-16

## 2018-10-16 RX ORDER — FAMOTIDINE 10 MG/ML
1 INJECTION INTRAVENOUS
Qty: 0 | Refills: 0 | COMMUNITY

## 2018-10-16 RX ORDER — APIXABAN 2.5 MG/1
1 TABLET, FILM COATED ORAL
Qty: 0 | Refills: 0 | COMMUNITY
Start: 2018-10-16

## 2018-10-16 RX ADMIN — AMLODIPINE BESYLATE 10 MILLIGRAM(S): 2.5 TABLET ORAL at 05:46

## 2018-10-16 RX ADMIN — APIXABAN 2.5 MILLIGRAM(S): 2.5 TABLET, FILM COATED ORAL at 05:46

## 2018-10-16 RX ADMIN — BUDESONIDE AND FORMOTEROL FUMARATE DIHYDRATE 2 PUFF(S): 160; 4.5 AEROSOL RESPIRATORY (INHALATION) at 05:47

## 2018-10-16 RX ADMIN — ATORVASTATIN CALCIUM 80 MILLIGRAM(S): 80 TABLET, FILM COATED ORAL at 22:23

## 2018-10-16 RX ADMIN — BUDESONIDE AND FORMOTEROL FUMARATE DIHYDRATE 2 PUFF(S): 160; 4.5 AEROSOL RESPIRATORY (INHALATION) at 17:25

## 2018-10-16 RX ADMIN — INSULIN GLARGINE 10 UNIT(S): 100 INJECTION, SOLUTION SUBCUTANEOUS at 22:23

## 2018-10-16 RX ADMIN — Medication 4: at 11:17

## 2018-10-16 RX ADMIN — Medication 112 MICROGRAM(S): at 05:47

## 2018-10-16 RX ADMIN — Medication 81 MILLIGRAM(S): at 18:06

## 2018-10-16 RX ADMIN — APIXABAN 2.5 MILLIGRAM(S): 2.5 TABLET, FILM COATED ORAL at 17:24

## 2018-10-16 RX ADMIN — MONTELUKAST 10 MILLIGRAM(S): 4 TABLET, CHEWABLE ORAL at 11:02

## 2018-10-16 RX ADMIN — Medication 2: at 07:50

## 2018-10-16 RX ADMIN — CEFTRIAXONE 100 GRAM(S): 500 INJECTION, POWDER, FOR SOLUTION INTRAMUSCULAR; INTRAVENOUS at 01:01

## 2018-10-16 RX ADMIN — Medication 4: at 17:24

## 2018-10-16 NOTE — DISCHARGE NOTE ADULT - SECONDARY DIAGNOSIS.
Mild intermittent asthma without complication Chronic atrial fibrillation Type 2 diabetes mellitus without complication, with long-term current use of insulin Essential hypertension History of CVA (cerebrovascular accident) HLD (hyperlipidemia) Paroxysmal A-fib

## 2018-10-16 NOTE — PHYSICAL THERAPY INITIAL EVALUATION ADULT - LIVES WITH, PROFILE
Lives with family members, pvt house, pt did not furnish any information, non verbally commnunicative

## 2018-10-16 NOTE — PROGRESS NOTE ADULT - SUBJECTIVE AND OBJECTIVE BOX
Patient is a 89y old  Female who presents with a chief complaint of AMS (16 Oct 2018 14:27)        HPI:  89 year old woman with PMH of DM2, HTN, HLD, Hypothyroidism, Asthma, Afib on Eliquis presents with family brought in for concern of acute change in mentation given CVA over  this past summer.  Pt providing limited history.  Family not present at time of encounter, as per ED attending who spoke with family she is much more confused than she normally is-she is confused at baseline.  No witnessed slurred speech, facial asymmetry.  No other complaints at this time.    In the ED, work up including CT head only remarkable for UA consistent with UTI. (14 Oct 2018 23:55)      SUBJECTIVE & OBJECTIVE: Pt seen and examined at bedside.     PHYSICAL EXAM:  T(C): 36 (10-16-18 @ 16:35), Max: 36.6 ( @ 08:48)  HR: 79 (10-16-18 @ 16:35) (68 - 79)  BP: 158/75 (10-16-18 @ 16:35) (141/56 - 180/77)  RR: 18 (10-16-18 @ 16:35) (16 - 18)  SpO2: 100% (10-16-18 @ 16:35) (96% - 100%)  Wt(kg): --   I&O's Detail    15 Oct 2018 07:  -  16 Oct 2018 07:00  --------------------------------------------------------  IN:    Oral Fluid: 110 mL    sodium chloride 0.9%.: 1020 mL  Total IN: 1130 mL    OUT:  Total OUT: 0 mL    Total NET: 1130 mL      16 Oct 2018 07:  -  16 Oct 2018 22:26  --------------------------------------------------------  IN:    Oral Fluid: 450 mL    sodium chloride 0.9%.: 765 mL  Total IN: 1215 mL    OUT:  Total OUT: 0 mL    Total NET: 1215 mL        GENERAL: NAD, well-groomed, well-developed  HEAD:  Atraumatic, Normocephalic  EYES: EOMI, PERRLA, conjunctiva and sclera clear  ENMT: Moist mucous membranes  NECK: Supple, No JVD  NERVOUS SYSTEM:  Alert & Oriented X3, Motor Strength 5/5 B/L upper and lower extremities; DTRs 2+ intact and symmetric  CHEST/LUNG: Clear to auscultation bilaterally; No rales, rhonchi, wheezing, or rubs  HEART: Regular rate and rhythm; No murmurs, rubs, or gallops  ABDOMEN: Soft, Nontender, Nondistended; Bowel sounds present  EXTREMITIES:  2+ Peripheral Pulses, No clubbing, cyanosis, or edema    MEDICATIONS  (STANDING):  amLODIPine   Tablet 10 milliGRAM(s) Oral daily  apixaban 2.5 milliGRAM(s) Oral every 12 hours  aspirin enteric coated 81 milliGRAM(s) Oral daily  atorvastatin 80 milliGRAM(s) Oral at bedtime  buDESOnide 160 MICROgram(s)/formoterol 4.5 MICROgram(s) Inhaler 2 Puff(s) Inhalation two times a day  dextrose 5%. 1000 milliLiter(s) (50 mL/Hr) IV Continuous <Continuous>  dextrose 50% Injectable 12.5 Gram(s) IV Push once  dextrose 50% Injectable 25 Gram(s) IV Push once  dextrose 50% Injectable 25 Gram(s) IV Push once  insulin glargine Injectable (LANTUS) 10 Unit(s) SubCutaneous at bedtime  insulin lispro (HumaLOG) corrective regimen sliding scale   SubCutaneous three times a day before meals  insulin lispro Injectable (HumaLOG) 3 Unit(s) SubCutaneous three times a day before meals  levothyroxine 112 MICROGram(s) Oral daily  montelukast 10 milliGRAM(s) Oral daily  sodium chloride 0.9%. 1000 milliLiter(s) (85 mL/Hr) IV Continuous <Continuous>    MEDICATIONS  (PRN):  acetaminophen   Tablet .. 650 milliGRAM(s) Oral every 6 hours PRN Temp greater or equal to 38C (100.4F), Mild Pain (1 - 3)  dextrose 40% Gel 15 Gram(s) Oral once PRN Blood Glucose LESS THAN 70 milliGRAM(s)/deciliter  glucagon  Injectable 1 milliGRAM(s) IntraMuscular once PRN Glucose LESS THAN 70 milligrams/deciliter      LABS:                        10.9   11.28 )-----------( 285      ( 16 Oct 2018 07:05 )             34.5     10-    136  |  101  |  13  ----------------------------<  166<H>  3.9   |  26  |  1.13    Ca    8.9      16 Oct 2018 07:05        Urinalysis Basic - ( 15 Oct 2018 00:50 )    Color: Yellow / Appearance: Slightly Turbid / S.010 / pH: x  Gluc: x / Ketone: Negative  / Bili: Negative / Urobili: Negative mg/dL   Blood: x / Protein: 100 mg/dL / Nitrite: Negative   Leuk Esterase: Moderate / RBC: 11-25 /HPF / WBC >50   Sq Epi: x / Non Sq Epi: Few / Bacteria: Moderate        CAPILLARY BLOOD GLUCOSE      POCT Blood Glucose.: 209 mg/dL (16 Oct 2018 22:21)  POCT Blood Glucose.: 218 mg/dL (16 Oct 2018 17:23)  POCT Blood Glucose.: 223 mg/dL (16 Oct 2018 11:09)  POCT Blood Glucose.: 155 mg/dL (16 Oct 2018 07:50)            RECENT CULTURES:  Urine culture:  10-15 @ 08:22 --   Normal Urogenital mone present      RADIOLOGY & ADDITIONAL TESTS:  Imaging Personally Reviewed:  [ ] YES  [ ] NO    Consultant(s) Notes Reviewed:  [ ] YES  [ ] NO    Care Discussed with Consultants/Other Providers [ ] YES  [ ] NO

## 2018-10-16 NOTE — PHYSICAL THERAPY INITIAL EVALUATION ADULT - ADDITIONAL COMMENTS
Pt may have been ambulatory with or without walking device but pt did not give any information about functional status or ability prior to being hospitalized. To be confirmed from family members.

## 2018-10-16 NOTE — PHYSICAL THERAPY INITIAL EVALUATION ADULT - DIAGNOSIS, PT EVAL
Deficits in strength all extremities (no significant difference in strength in extremities), difficulty in bed mobility, transfers and ambulation ; fall risk.

## 2018-10-16 NOTE — DISCHARGE NOTE ADULT - MEDICATION SUMMARY - MEDICATIONS TO TAKE
I will START or STAY ON the medications listed below when I get home from the hospital:    losartan 50 mg oral tablet  -- 1 tab(s) by mouth once a day  -- Indication: For HTN    apixaban 2.5 mg oral tablet  -- 1 tab(s) by mouth every 12 hours  -- Indication: For A. fib    Januvia 25 mg oral tablet  -- 1 tab(s) by mouth once a day  -- Indication: For DM    glipiZIDE 5 mg oral tablet  -- 1 tab(s) by mouth once a day  -- Indication: For DM    Levemir 100 units/mL subcutaneous solution  -- 15 unit(s) subcutaneous once a day  -- Indication: For DM    atorvastatin 40 mg oral tablet  -- 1 tab(s) by mouth once a day   -- Avoid grapefruit and grapefruit juice while taking this medication.  Do not take this drug if you are pregnant.  It is very important that you take or use this exactly as directed.  Do not skip doses or discontinue unless directed by your doctor.  Obtain medical advice before taking any non-prescription drugs as some may affect the action of this medication.  Take with food or milk.    -- Indication: For HLD    carvedilol 3.125 mg oral tablet  -- 1 tab(s) by mouth 2 times a day  -- Indication: For HTN    Symbicort 160 mcg-4.5 mcg/inh inhalation aerosol  -- 2 puff(s) inhaled 2 times a day  -- Indication: For Pulm HTN    montelukast 10 mg oral tablet  -- 1 tab(s) by mouth once a day  -- Indication: For Pulm HTN    levothyroxine 112 mcg (0.112 mg) oral tablet  -- 1 tab(s) by mouth once a day  -- Indication: For Hypothyroidism I will START or STAY ON the medications listed below when I get home from the hospital:    Aspirin Enteric Coated 81 mg oral delayed release tablet  -- 1 tab(s) by mouth once a day   -- Swallow whole.  Do not crush.  Take with food or milk.    -- Indication: For HTN    losartan 50 mg oral tablet  -- 1 tab(s) by mouth once a day  -- Indication: For HTN    apixaban 2.5 mg oral tablet  -- 1 tab(s) by mouth every 12 hours  -- Indication: For A. fib    Januvia 25 mg oral tablet  -- 1 tab(s) by mouth once a day  -- Indication: For DM    glipiZIDE 5 mg oral tablet  -- 1 tab(s) by mouth once a day  -- Indication: For DM    Levemir 100 units/mL subcutaneous solution  -- 15 unit(s) subcutaneous once a day  -- Indication: For DM    atorvastatin 40 mg oral tablet  -- 1 tab(s) by mouth once a day   -- Avoid grapefruit and grapefruit juice while taking this medication.  Do not take this drug if you are pregnant.  It is very important that you take or use this exactly as directed.  Do not skip doses or discontinue unless directed by your doctor.  Obtain medical advice before taking any non-prescription drugs as some may affect the action of this medication.  Take with food or milk.    -- Indication: For HLD    carvedilol 3.125 mg oral tablet  -- 1 tab(s) by mouth 2 times a day  -- Indication: For HTN    Symbicort 160 mcg-4.5 mcg/inh inhalation aerosol  -- 2 puff(s) inhaled 2 times a day  -- Indication: For Pulm HTN    montelukast 10 mg oral tablet  -- 1 tab(s) by mouth once a day  -- Indication: For Pulm HTN    levothyroxine 112 mcg (0.112 mg) oral tablet  -- 1 tab(s) by mouth once a day  -- Indication: For Hypothyroidism

## 2018-10-16 NOTE — PHYSICAL THERAPY INITIAL EVALUATION ADULT - PLANNED THERAPY INTERVENTIONS, PT EVAL
balance training/motor coordination training/strengthening/gait training/transfer training/bed mobility training/neuromuscular re-education

## 2018-10-16 NOTE — PHYSICAL THERAPY INITIAL EVALUATION ADULT - TRANSFER SAFETY CONCERNS NOTED: BED/CHAIR, REHAB EVAL
stepping too close to front of assistive device/decreased safety awareness/decreased step length/losing balance/decreased balance during turns

## 2018-10-16 NOTE — DISCHARGE NOTE ADULT - CARE PLAN
Principal Discharge DX:	Acute CVA (cerebrovascular accident)  Goal:	continue with ASA, statin  Assessment and plan of treatment:	follow with PCP within 1-2 weeks  Secondary Diagnosis:	Mild intermittent asthma without complication  Secondary Diagnosis:	Chronic atrial fibrillation  Secondary Diagnosis:	Type 2 diabetes mellitus without complication, with long-term current use of insulin  Secondary Diagnosis:	Essential hypertension  Secondary Diagnosis:	History of CVA (cerebrovascular accident)  Secondary Diagnosis:	HLD (hyperlipidemia) Principal Discharge DX:	Acute CVA (cerebrovascular accident)  Goal:	continue with ASA, statin  Assessment and plan of treatment:	follow with PCP within 1-2 weeks  Secondary Diagnosis:	Mild intermittent asthma without complication  Secondary Diagnosis:	Type 2 diabetes mellitus without complication, with long-term current use of insulin  Secondary Diagnosis:	Essential hypertension  Secondary Diagnosis:	History of CVA (cerebrovascular accident)  Secondary Diagnosis:	HLD (hyperlipidemia)  Secondary Diagnosis:	Paroxysmal A-fib

## 2018-10-16 NOTE — DISCHARGE NOTE ADULT - PROVIDER TOKENS
FREE:[LAST:[Diana],PHONE:[(   )    -],FAX:[(   )    -],ADDRESS:[Dr. Ravi Ortiz    Internal medicine    [address], [place] [address]    [phone]]]

## 2018-10-16 NOTE — PHYSICAL THERAPY INITIAL EVALUATION ADULT - GENERAL OBSERVATIONS, REHAB EVAL
Found supine, follow simple one step commands with verbal, visual and tactile cues, not in distress, on room air, communicates with hand gestures and head nodding but not verbally.

## 2018-10-16 NOTE — DISCHARGE NOTE ADULT - PATIENT PORTAL LINK FT
You can access the [facility], offered by [facility], by registering with the following website: [email or web address]

## 2018-10-16 NOTE — DISCHARGE NOTE ADULT - HOSPITAL COURSE
89 year old woman with PMH of DM2, HTN, HLD, Hypothyroidism, chronic right shoulder arthritis, mild persistent Asthma, A. fib on Eliquis, CVA 7/2018 (admitted in [facility]) presents with family brought in for concern of acute change in mentation. Granddaughter at bedside. States that 3 days prior to admission patient had a change in behavior where she would sit and not talk but just stare at the wall. Patient has confusion at baseline but she is very chatty.  Because she did not improve they brought her to the ED.     Hospital Course: Patient was sent for MRI which showed acute Left MCA stroke. Patient's mental status improved to baseline over the course of 2 days. Family initially wanted to take her home but then ultimately decided that [facility] would be more beneficial.          Assessment and Plan:  # Left MCA stroke  -MS back to baseline  -Neurology was consulted.   -continue with ASA and statin   -follow-up with PCP - Dr. Ortiz ([phone]) within 1-2 weeks     #DM2, uncontrolled  -Hemoglobin A1C, 9.6%  -continue home medications   -follow-up with PCP     #HTN, controlled  -c/w amlodipine     #HLD, not on a statin at home (per pharmacy no Rx)  -c/w statin    #Hypothyroidism  -Thyroid Stimulating Hormone, Serum: 4.660 uU/mL (10.15.18 @ 07:31)  -Free Thyroxine, Serum: 1.6 ng/dL (10.15.18 @ 12:29)  -C/w Synthroid     #Mild persistent asthma, not in exacerbation   -continue with albuterol prn, symbicort     #Paroxysmal A. fib on Eliquis   -Rate control with Carvedilol    #History of CVA, continue with ASA and statin     Discharge time : 40 min

## 2018-10-16 NOTE — DISCHARGE NOTE ADULT - MEDICATION SUMMARY - MEDICATIONS TO STOP TAKING
I will STOP taking the medications listed below when I get home from the hospital:    amLODIPine 10 mg oral tablet  -- 1 tab(s) by mouth once a day    hydrALAZINE 20 mg/mL injectable solution  -- 10  injectable every 6 hours, As Needed    insulin lispro (concentrated) 200 units/mL subcutaneous solution  -- 1 unit(s) subcutaneous 3 times a day    Follow sliding scale  1 Unit(s) if Glucose 151 - 200  2 Unit(s) if Glucose 201 - 250  3 Unit(s) if Glucose 251 - 300  4 Unit(s) if Glucose 301 - 350  5 Unit(s) if Glucose 351 - 400  6 Unit(s) if Glucose Greater Than 400 I will STOP taking the medications listed below when I get home from the hospital:  None

## 2018-10-17 DIAGNOSIS — E03.9 HYPOTHYROIDISM, UNSPECIFIED: ICD-10-CM

## 2018-10-17 LAB
HCT VFR BLD CALC: 34.1 % — LOW (ref 34.5–45)
HGB BLD-MCNC: 10.6 G/DL — LOW (ref 11.5–15.5)
MCHC RBC-ENTMCNC: 24.7 PG — LOW (ref 27–34)
MCHC RBC-ENTMCNC: 31.1 GM/DL — LOW (ref 32–36)
MCV RBC AUTO: 79.5 FL — LOW (ref 80–100)
NRBC # BLD: 0 /100 WBCS — SIGNIFICANT CHANGE UP (ref 0–0)
PLATELET # BLD AUTO: 245 K/UL — SIGNIFICANT CHANGE UP (ref 150–400)
RBC # BLD: 4.29 M/UL — SIGNIFICANT CHANGE UP (ref 3.8–5.2)
RBC # FLD: 16.9 % — HIGH (ref 10.3–14.5)
WBC # BLD: 8.45 K/UL — SIGNIFICANT CHANGE UP (ref 3.8–10.5)
WBC # FLD AUTO: 8.45 K/UL — SIGNIFICANT CHANGE UP (ref 3.8–10.5)

## 2018-10-17 PROCEDURE — 99232 SBSQ HOSP IP/OBS MODERATE 35: CPT

## 2018-10-17 RX ORDER — CARVEDILOL PHOSPHATE 80 MG/1
3.12 CAPSULE, EXTENDED RELEASE ORAL EVERY 12 HOURS
Qty: 0 | Refills: 0 | Status: DISCONTINUED | OUTPATIENT
Start: 2018-10-17 | End: 2018-10-17

## 2018-10-17 RX ORDER — CARVEDILOL PHOSPHATE 80 MG/1
3.12 CAPSULE, EXTENDED RELEASE ORAL EVERY 12 HOURS
Qty: 0 | Refills: 0 | Status: DISCONTINUED | OUTPATIENT
Start: 2018-10-17 | End: 2018-10-18

## 2018-10-17 RX ORDER — NYSTATIN CREAM 100000 [USP'U]/G
1 CREAM TOPICAL
Qty: 0 | Refills: 0 | Status: DISCONTINUED | OUTPATIENT
Start: 2018-10-17 | End: 2018-10-18

## 2018-10-17 RX ADMIN — ATORVASTATIN CALCIUM 80 MILLIGRAM(S): 80 TABLET, FILM COATED ORAL at 22:09

## 2018-10-17 RX ADMIN — Medication 3 UNIT(S): at 08:01

## 2018-10-17 RX ADMIN — NYSTATIN CREAM 1 APPLICATION(S): 100000 CREAM TOPICAL at 17:46

## 2018-10-17 RX ADMIN — BUDESONIDE AND FORMOTEROL FUMARATE DIHYDRATE 2 PUFF(S): 160; 4.5 AEROSOL RESPIRATORY (INHALATION) at 17:46

## 2018-10-17 RX ADMIN — Medication 112 MICROGRAM(S): at 06:06

## 2018-10-17 RX ADMIN — SODIUM CHLORIDE 85 MILLILITER(S): 9 INJECTION INTRAMUSCULAR; INTRAVENOUS; SUBCUTANEOUS at 07:00

## 2018-10-17 RX ADMIN — Medication 3 UNIT(S): at 11:19

## 2018-10-17 RX ADMIN — MONTELUKAST 10 MILLIGRAM(S): 4 TABLET, CHEWABLE ORAL at 11:17

## 2018-10-17 RX ADMIN — Medication 4: at 11:19

## 2018-10-17 RX ADMIN — Medication 81 MILLIGRAM(S): at 11:18

## 2018-10-17 RX ADMIN — Medication 3 UNIT(S): at 17:46

## 2018-10-17 RX ADMIN — APIXABAN 2.5 MILLIGRAM(S): 2.5 TABLET, FILM COATED ORAL at 06:06

## 2018-10-17 RX ADMIN — CARVEDILOL PHOSPHATE 3.12 MILLIGRAM(S): 80 CAPSULE, EXTENDED RELEASE ORAL at 22:09

## 2018-10-17 RX ADMIN — Medication 4: at 17:46

## 2018-10-17 RX ADMIN — INSULIN GLARGINE 10 UNIT(S): 100 INJECTION, SOLUTION SUBCUTANEOUS at 22:09

## 2018-10-17 RX ADMIN — AMLODIPINE BESYLATE 10 MILLIGRAM(S): 2.5 TABLET ORAL at 06:06

## 2018-10-17 RX ADMIN — BUDESONIDE AND FORMOTEROL FUMARATE DIHYDRATE 2 PUFF(S): 160; 4.5 AEROSOL RESPIRATORY (INHALATION) at 06:29

## 2018-10-17 RX ADMIN — APIXABAN 2.5 MILLIGRAM(S): 2.5 TABLET, FILM COATED ORAL at 17:46

## 2018-10-17 RX ADMIN — Medication 2: at 08:01

## 2018-10-17 NOTE — DIETITIAN INITIAL EVALUATION ADULT. - NUTRITION DIAGNOSTIC TERMINOLOGY #1
Physical Activity and Function/Nutrient/Energy Balance/Carbohydrate and Fiber/Other Physical Activity and Function/Carbohydrate and Fiber Carbohydrate and Fiber

## 2018-10-17 NOTE — DIETITIAN INITIAL EVALUATION ADULT. - ENERGY NEEDS
Height (cm): 162.56 (10-15)  Weight (kg): 73.7 (10-17)  BMI (kg/m2): 27.9 (10-17)  IBW (kg): 54.5 +/-10%   % IBW: 135  UBW (kg): Unknown    % UBW: N/A

## 2018-10-17 NOTE — DIETITIAN INITIAL EVALUATION ADULT. - OTHER INFO
Pt seen for high HgA1c. Pt unable to answer questions. When asked if Pt had difficulty chewing/swalliwing she pointed to her missing teeth. SLP evaluation (10/17) recommended regular consistency c thin liquids. Pt seen for high HgA1c. Pt unable to answer questions. When asked if Pt had difficulty chewing/swallowing she pointed to her missing teeth. SLP evaluation (10/17) recommended regular consistency c thin liquids. As per RN, Pt is eating well. Pt seen for high HgA1c. Pt unable to answer questions. When asked if Pt had difficulty chewing/swallowing she stuck her tongue through her missing teeth. SLP evaluation (10/17) recommended regular consistency c thin liquids. As per RN, Pt is eating well with complete assistance. Pt seen for high HgA1c. Pt unable to answer questions. When asked if Pt had difficulty chewing/swallowing she stuck her tongue through her missing teeth. SLP evaluation (10/17) recommended regular consistency c thin liquids. No N/V/C/D noted or chewing/swallowing problems noted on medical chart. As per H&P patient takes insulin Lispro 1 unit 3xday. As per RN, Pt is eating well with complete assistance.

## 2018-10-17 NOTE — SWALLOW BEDSIDE ASSESSMENT ADULT - COMMENTS
MRI head 10/15/2018 IMPRESSION:    1)  small acute left MCA infarct..2)  multifocal chronic ischemic changes in both hemispheres inclusive of   a small old right ABELARDO infarct. No hemorrhagic lesion or mass suggested.  The study is degraded by patient motion during the exam..

## 2018-10-17 NOTE — CONSULT NOTE ADULT - SUBJECTIVE AND OBJECTIVE BOX
Subjective Complaints:  Historian:       Consult requested by ER doctor:                  Attending:     HPI:  89 year old woman with PMH of DM2, HTN, HLD, Hypothyroidism, Asthma, Afib on Eliquis presents with family brought in for concern of acute change in mentation given CVA over  this past summer.  Pt providing limited history.  Family not present at time of encounter, as per ED attending who spoke with family she is much more confused than she normally is-she is confused at baseline.  No witnessed slurred speech, facial asymmetry.  No other complaints at this time.    In the ED, work up including CT head only remarkable for UA consistent with UTI. (14 Oct 2018 23:55)    YANY GRIGGS    PAST MEDICAL & SURGICAL HISTORY:  DM (diabetes mellitus)  Hypothyroidism  HLD (hyperlipidemia)  HTN (hypertension)  Atrial fibrillation  Hypothyroid  Diabetes  Hypercholesteremia  Asthma  Hypertension  No significant past surgical history  No significant past surgical history  89yFemale    MEDICATIONS  (STANDING):  amLODIPine   Tablet 10 milliGRAM(s) Oral daily  apixaban 2.5 milliGRAM(s) Oral every 12 hours  aspirin enteric coated 81 milliGRAM(s) Oral daily  atorvastatin 80 milliGRAM(s) Oral at bedtime  buDESOnide 160 MICROgram(s)/formoterol 4.5 MICROgram(s) Inhaler 2 Puff(s) Inhalation two times a day  dextrose 5%. 1000 milliLiter(s) (50 mL/Hr) IV Continuous <Continuous>  dextrose 50% Injectable 12.5 Gram(s) IV Push once  dextrose 50% Injectable 25 Gram(s) IV Push once  dextrose 50% Injectable 25 Gram(s) IV Push once  insulin glargine Injectable (LANTUS) 10 Unit(s) SubCutaneous at bedtime  insulin lispro (HumaLOG) corrective regimen sliding scale   SubCutaneous three times a day before meals  insulin lispro Injectable (HumaLOG) 3 Unit(s) SubCutaneous three times a day before meals  levothyroxine 112 MICROGram(s) Oral daily  montelukast 10 milliGRAM(s) Oral daily    MEDICATIONS  (PRN):  acetaminophen   Tablet .. 650 milliGRAM(s) Oral every 6 hours PRN Temp greater or equal to 38C (100.4F), Mild Pain (1 - 3)  dextrose 40% Gel 15 Gram(s) Oral once PRN Blood Glucose LESS THAN 70 milliGRAM(s)/deciliter  glucagon  Injectable 1 milliGRAM(s) IntraMuscular once PRN Glucose LESS THAN 70 milligrams/deciliter      Allergies    No Known Allergies    Intolerances      FAMILY HISTORY:  No pertinent family history in first degree relatives      REVIEW OF SYSTEMS:  General:  No wt loss, fevers, chills, night sweats  Eyes:  Good vision, no reported pain  ENT:  No sore throat, pain, runny nose, dysphagia  CV:  No pain, palpitatioins, hypo/hypertension  Resp:  No dyspnea, cough, tachypnea, wheezing  GI:  No pain, nausea, vomiting, diarrhea, constipatiion  :  No pain, bleeding, incontinence, nocturia  Muscle:  No pain, weakness  Breast:  No pain, abscess, mass, discharge  Neuro:  No weakness, tingling, memory problems  Psych:  No fatigue, insomnia, mood problems, depression  Endocrine:  No polyuria, polydypsia, cold/heat intolerance  Heme:  No petechiae, ecchymosis, easy bruisability  Skin:  No rash, tattoos, scars, edema      Vital Signs Last 24 Hrs  T(C): 36.8 (17 Oct 2018 05:30), Max: 36.8 (17 Oct 2018 05:30)  T(F): 98.2 (17 Oct 2018 05:30), Max: 98.2 (17 Oct 2018 05:30)  HR: 71 (17 Oct 2018 05:30) (70 - 79)  BP: 130/77 (17 Oct 2018 05:30) (130/77 - 162/77)  BP(mean): --  RR: 18 (17 Oct 2018 05:30) (16 - 18)  SpO2: 99% (17 Oct 2018 05:30) (96% - 100%)    GENERAL PHYSICAL EXAM:  General:  Appears stated age, well-groomed, well-nourished, no distress  HEENT:  NC/AT, patent nares w/ pink mucosa, OP clear w/o lesions, PERRL, EOMI, conjunctivae clear, no thyromegaly, nodules, adenopathy, no JVD  Chest:  Full & symmetric excursion, no increased effort, breath sounds clear  Cardiovascular:  Regular rhythm, S1, S2, no murmur/rub/S3/S4, no carotid/femoral/abdominal bruit, radial/pedal pulses 2+, no edema  Abdomen:  Soft, non-tender, non-distended, normoactive bowel sounds, no HSM  Extremities:  Gait & station:   Digits:   Nails:   Joints, Bones, Muscles:   ROM:   Stability:  Skin:  No rash/erythema/ecchymoses/petechiae/wounds/abscess/warm/dry  Musculoskeletal:  Full ROM in all joints w/o swelling/tenderness/effusion    NEUROLOGICAL EXAM:  HENT:  Normocephalic head; atraumatic head.  Neck supple.  ENT: normal looking.  Mental State:    Alert.  Fully oriented to person, place and date.  Coherent.  NO SPEECH PRODUCTION   Cooperative.  Responds TO COMMAND   Cranial Nerves:  II-XII:   Pupils round and reactive to light and accommodation.  Extraocular movements full.  Visual fields full (no homonymous hemianopsia).  Visual acuity wnl.  Facial symmetry intact.  Tongue midline.  Motor Functions:  Moves all extremities.  No pronator DRIFT STRENGTH IS 3/5 THROUGHOUT   Sensory Functions:  UNRELIABLE     Reflexes:  Deep tendon reflexes normoactive to biceps, knees and ankles. PLANTAR RESPONSES ARE FLEXOR   Cerebellar Testing:    Finger to nose intact.  Nystagmus absent.  GAIT: UNABLE TO STAND     LABS:                        10.6   8.45  )-----------( 245      ( 17 Oct 2018 06:24 )             34.1     10-16    136  |  101  |  13  ----------------------------<  166<H>  3.9   |  26  |  1.13    Ca    8.9      16 Oct 2018 07:05              RADIOLOGY & ADDITIONAL STUDIES:    Lipid Profile: STAT  Addl Info: add on (10-16 @ 09:51)  Hemoglobin A1C, Whole Blood: STAT  Addl Info: add on (10-16 @ 09:51)  POCT  Blood Glucose: 11:09 (10-16 @ 11:10)  POCT  Blood Glucose: 17:23 (10-16 @ 17:36)  Consult- Speech Bedside Swallow Evaluation:   Anticipated Discharge Date: 17-Oct-2018  *Reason for Consult - Must select at least one choice*     Other: new CVA (10-16 @ 17:46)  Complete Blood Count: AM Sched. Collection: 17-Oct-2018 05:00 (10-16 @ 17:51)  insulin lispro Injectable (HumaLOG):   3 Unit(s), SubCutaneous, three times a day before meals  Administration Instructions: Dispose unused medication in BLACK bin.  This is a Look-alike/Sound-alike Medication  Provider's Contact #: [phone] (10-16 @ 17:52)  aspirin enteric coated: [Known as Ecotrin]  81 milliGRAM(s), Oral, daily  Administration Instructions: swallow whole * don't crush/chew  Provider's Contact #: [phone] (10-16 @ 17:52)  POCT  Blood Glucose: 22:21 (10-16 @ 22:24)  POCT  Blood Glucose: 07:58 (10-17 @ 07:59)    BRAIN MRI : ACUTE LEFT MCA INFARCT ( EMBOLUS    Assessment & Opinion:    Recommendations:    CONTINUE AC AND STATIN --SUBACUTE REHAB

## 2018-10-17 NOTE — DIETITIAN INITIAL EVALUATION ADULT. - NUTRITIONGOAL OUTCOME1
Pt to maintain stable BG levels (140-180mg/dL) Pt to maintain stable BG levels (140-180mg/dL); HgA1c <8%

## 2018-10-17 NOTE — DIETITIAN INITIAL EVALUATION ADULT. - ADHERENCE
n/a/Unable to ascertain; however, HgA1c noted to be 9.6% Unable to ascertain; however, HgA1c 9.6%/n/a n/a/Unable to ascertain and no family @bedside; however, HgA1c 9.6%

## 2018-10-17 NOTE — DIETITIAN INITIAL EVALUATION ADULT. - PERTINENT MEDS FT
acetaminophen   Tablet .. PRN  amLODIPine   Tablet  apixaban  aspirin enteric coated  atorvastatin  buDESOnide 160 MICROgram(s)/formoterol 4.5 MICROgram(s) Inhaler  carvedilol  dextrose 40% Gel PRN  dextrose 5%.  dextrose 50% Injectable  dextrose 50% Injectable  dextrose 50% Injectable  glucagon  Injectable PRN  insulin glargine Injectable (LANTUS)  insulin lispro (HumaLOG) corrective regimen sliding scale  insulin lispro Injectable (HumaLOG)  levothyroxine  montelukast  nystatin Powder

## 2018-10-17 NOTE — SWALLOW BEDSIDE ASSESSMENT ADULT - H & P REVIEW
yes/89 year old woman with PMH of DM2, HTN, HLD, Hypothyroidism, Asthma, Afib on Eliquis presents with family brought in for concern of acute change in mentation given CVA over  this past summer.  Pt providing limited history.  Family not present at time of encounter, as per ED attending who spoke with family she is much more confused than she normally is-she is confused at baseline.  No witnessed slurred speech, facial asymmetry.  No other complaints at this time.

## 2018-10-17 NOTE — DIETITIAN INITIAL EVALUATION ADULT. - PERTINENT LABORATORY DATA
10-17 Na n/a   Glu n/a   K+ n/a   Cr n/a   BUN n/a   Phos n/a   Alb n/a   PAB n/a   Hgb 10.6 g/dL<L> Hct 34.1 %<L> HgA1C n/a    Hemoglobin A1C, Whole Blood: 9.6 % (10-16-18 @ 12:54)

## 2018-10-17 NOTE — PROGRESS NOTE ADULT - SUBJECTIVE AND OBJECTIVE BOX
Patient is a 89y old  Female who presents with a chief complaint of AMS (17 Oct 2018 09:05)        HPI:  89 year old woman with PMH of DM2, HTN, HLD, Hypothyroidism, Asthma, Afib on Eliquis presents with family brought in for concern of acute change in mentation given CVA over  this past summer.  Pt providing limited history.  Family not present at time of encounter, as per ED attending who spoke with family she is much more confused than she normally is-she is confused at baseline.  No witnessed slurred speech, facial asymmetry.  No other complaints at this time.    In the ED, work up including CT head only remarkable for UA consistent with UTI. (14 Oct 2018 23:55)      SUBJECTIVE & OBJECTIVE: Pt seen and examined at bedside.     PHYSICAL EXAM:  T(C): 36.2 (10-17-18 @ 17:05), Max: 36.8 (10-17-18 @ 05:30)  HR: 65 (10-17-18 @ 17:05) (65 - 71)  BP: 154/54 (10-17-18 @ 17:05) (121/58 - 162/77)  RR: 18 (10-17-18 @ 17:05) (16 - 18)  SpO2: 100% (10-17-18 @ 17:05) (99% - 100%)  Wt(kg): --   I&O's Detail    16 Oct 2018 07:01  -  17 Oct 2018 07:00  --------------------------------------------------------  IN:    Oral Fluid: 690 mL    sodium chloride 0.9%: 765 mL  Total IN: 1455 mL    OUT:  Total OUT: 0 mL    Total NET: 1455 mL      17 Oct 2018 07:01  -  17 Oct 2018 23:20  --------------------------------------------------------  IN:    Oral Fluid: 1320 mL  Total IN: 1320 mL    OUT:  Total OUT: 0 mL    Total NET: 1320 mL        GENERAL: NAD, well-groomed, well-developed  HEAD:  Atraumatic, Normocephalic  EYES: EOMI, PERRLA, conjunctiva and sclera clear  ENMT: Moist mucous membranes  NECK: Supple, No JVD  NERVOUS SYSTEM:  Alert & Oriented X3, Motor Strength 5/5 B/L upper and lower extremities; DTRs 2+ intact and symmetric  CHEST/LUNG: Clear to auscultation bilaterally; No rales, rhonchi, wheezing, or rubs  HEART: Regular rate and rhythm; No murmurs, rubs, or gallops  ABDOMEN: Soft, Nontender, Nondistended; Bowel sounds present  EXTREMITIES:  2+ Peripheral Pulses, No clubbing, cyanosis, or edema    MEDICATIONS  (STANDING):  amLODIPine   Tablet 10 milliGRAM(s) Oral daily  apixaban 2.5 milliGRAM(s) Oral every 12 hours  aspirin enteric coated 81 milliGRAM(s) Oral daily  atorvastatin 80 milliGRAM(s) Oral at bedtime  buDESOnide 160 MICROgram(s)/formoterol 4.5 MICROgram(s) Inhaler 2 Puff(s) Inhalation two times a day  carvedilol 3.125 milliGRAM(s) Oral every 12 hours  dextrose 5%. 1000 milliLiter(s) (50 mL/Hr) IV Continuous <Continuous>  dextrose 50% Injectable 12.5 Gram(s) IV Push once  dextrose 50% Injectable 25 Gram(s) IV Push once  dextrose 50% Injectable 25 Gram(s) IV Push once  insulin glargine Injectable (LANTUS) 10 Unit(s) SubCutaneous at bedtime  insulin lispro (HumaLOG) corrective regimen sliding scale   SubCutaneous three times a day before meals  insulin lispro Injectable (HumaLOG) 3 Unit(s) SubCutaneous three times a day before meals  levothyroxine 112 MICROGram(s) Oral daily  montelukast 10 milliGRAM(s) Oral daily  nystatin Powder 1 Application(s) Topical two times a day    MEDICATIONS  (PRN):  acetaminophen   Tablet .. 650 milliGRAM(s) Oral every 6 hours PRN Temp greater or equal to 38C (100.4F), Mild Pain (1 - 3)  dextrose 40% Gel 15 Gram(s) Oral once PRN Blood Glucose LESS THAN 70 milliGRAM(s)/deciliter  glucagon  Injectable 1 milliGRAM(s) IntraMuscular once PRN Glucose LESS THAN 70 milligrams/deciliter      LABS:                        10.6   8.45  )-----------( 245      ( 17 Oct 2018 06:24 )             34.1     10-16    136  |  101  |  13  ----------------------------<  166<H>  3.9   |  26  |  1.13    Ca    8.9      16 Oct 2018 07:05            CAPILLARY BLOOD GLUCOSE      POCT Blood Glucose.: 159 mg/dL (17 Oct 2018 22:07)  POCT Blood Glucose.: 201 mg/dL (17 Oct 2018 17:10)  POCT Blood Glucose.: 223 mg/dL (17 Oct 2018 11:16)  POCT Blood Glucose.: 187 mg/dL (17 Oct 2018 07:58)            RECENT CULTURES:  Urine culture:  10-15 @ 08:22 --   Normal Urogenital mone present      RADIOLOGY & ADDITIONAL TESTS:  Imaging Personally Reviewed:  [ ] YES  [ ] NO    Consultant(s) Notes Reviewed:  [ ] YES  [ ] NO    Care Discussed with Consultants/Other Providers [ ] YES  [ ] NO

## 2018-10-17 NOTE — SWALLOW BEDSIDE ASSESSMENT ADULT - SLP GENERAL OBSERVATIONS
pt seen bedside, alert and oriented to self. pt nonverbal except x1 word utterance, and inconsistently responded to yes no questions via head nod shake. she did not follow directions or models and noted good eye contact with the SLP

## 2018-10-18 VITALS
HEART RATE: 61 BPM | DIASTOLIC BLOOD PRESSURE: 63 MMHG | OXYGEN SATURATION: 97 % | RESPIRATION RATE: 17 BRPM | TEMPERATURE: 97 F | SYSTOLIC BLOOD PRESSURE: 159 MMHG

## 2018-10-18 PROCEDURE — 99239 HOSP IP/OBS DSCHRG MGMT >30: CPT

## 2018-10-18 RX ADMIN — AMLODIPINE BESYLATE 10 MILLIGRAM(S): 2.5 TABLET ORAL at 06:34

## 2018-10-18 RX ADMIN — NYSTATIN CREAM 1 APPLICATION(S): 100000 CREAM TOPICAL at 06:20

## 2018-10-18 RX ADMIN — BUDESONIDE AND FORMOTEROL FUMARATE DIHYDRATE 2 PUFF(S): 160; 4.5 AEROSOL RESPIRATORY (INHALATION) at 06:21

## 2018-10-18 RX ADMIN — CARVEDILOL PHOSPHATE 3.12 MILLIGRAM(S): 80 CAPSULE, EXTENDED RELEASE ORAL at 06:21

## 2018-10-18 RX ADMIN — Medication 4: at 12:27

## 2018-10-18 RX ADMIN — APIXABAN 2.5 MILLIGRAM(S): 2.5 TABLET, FILM COATED ORAL at 06:34

## 2018-10-18 RX ADMIN — Medication 2: at 08:24

## 2018-10-18 RX ADMIN — Medication 3 UNIT(S): at 12:27

## 2018-10-18 RX ADMIN — Medication 81 MILLIGRAM(S): at 12:24

## 2018-10-18 RX ADMIN — Medication 3 UNIT(S): at 08:24

## 2018-10-18 RX ADMIN — Medication 112 MICROGRAM(S): at 06:21

## 2018-10-18 RX ADMIN — MONTELUKAST 10 MILLIGRAM(S): 4 TABLET, CHEWABLE ORAL at 12:25

## 2018-10-19 ENCOUNTER — APPOINTMENT (OUTPATIENT)
Dept: ORTHOPEDIC SURGERY | Facility: CLINIC | Age: 83
End: 2018-10-19

## 2018-10-22 DIAGNOSIS — E78.2 MIXED HYPERLIPIDEMIA: ICD-10-CM

## 2018-10-22 DIAGNOSIS — J45.30 MILD PERSISTENT ASTHMA, UNCOMPLICATED: ICD-10-CM

## 2018-10-22 DIAGNOSIS — I27.20 PULMONARY HYPERTENSION, UNSPECIFIED: ICD-10-CM

## 2018-10-22 DIAGNOSIS — Z79.4 LONG TERM (CURRENT) USE OF INSULIN: ICD-10-CM

## 2018-10-22 DIAGNOSIS — I10 ESSENTIAL (PRIMARY) HYPERTENSION: ICD-10-CM

## 2018-10-22 DIAGNOSIS — E11.65 TYPE 2 DIABETES MELLITUS WITH HYPERGLYCEMIA: ICD-10-CM

## 2018-10-22 DIAGNOSIS — I63.412 CEREBRAL INFARCTION DUE TO EMBOLISM OF LEFT MIDDLE CEREBRAL ARTERY: ICD-10-CM

## 2018-10-22 DIAGNOSIS — R41.82 ALTERED MENTAL STATUS, UNSPECIFIED: ICD-10-CM

## 2018-10-22 DIAGNOSIS — I48.0 PAROXYSMAL ATRIAL FIBRILLATION: ICD-10-CM

## 2018-10-22 DIAGNOSIS — Z79.51 LONG TERM (CURRENT) USE OF INHALED STEROIDS: ICD-10-CM

## 2018-10-22 DIAGNOSIS — Z28.21 IMMUNIZATION NOT CARRIED OUT BECAUSE OF PATIENT REFUSAL: ICD-10-CM

## 2018-10-22 DIAGNOSIS — G93.41 METABOLIC ENCEPHALOPATHY: ICD-10-CM

## 2018-10-22 DIAGNOSIS — E03.9 HYPOTHYROIDISM, UNSPECIFIED: ICD-10-CM

## 2018-10-22 DIAGNOSIS — Z79.01 LONG TERM (CURRENT) USE OF ANTICOAGULANTS: ICD-10-CM

## 2018-10-22 DIAGNOSIS — N30.00 ACUTE CYSTITIS WITHOUT HEMATURIA: ICD-10-CM

## 2018-10-22 DIAGNOSIS — Z79.2 LONG TERM (CURRENT) USE OF ANTIBIOTICS: ICD-10-CM

## 2018-10-22 DIAGNOSIS — E11.9 TYPE 2 DIABETES MELLITUS WITHOUT COMPLICATIONS: ICD-10-CM

## 2018-10-23 DIAGNOSIS — J45.20 MILD INTERMITTENT ASTHMA, UNCOMPLICATED: ICD-10-CM

## 2018-10-23 DIAGNOSIS — I63.9 CEREBRAL INFARCTION, UNSPECIFIED: ICD-10-CM

## 2018-10-23 DIAGNOSIS — I48.0 PAROXYSMAL ATRIAL FIBRILLATION: ICD-10-CM

## 2018-10-23 DIAGNOSIS — E78.5 HYPERLIPIDEMIA, UNSPECIFIED: ICD-10-CM

## 2018-11-16 ENCOUNTER — APPOINTMENT (OUTPATIENT)
Dept: HOME HEALTH SERVICES | Facility: HOME HEALTH | Age: 83
End: 2018-11-16
Payer: MEDICARE

## 2018-11-16 VITALS
SYSTOLIC BLOOD PRESSURE: 119 MMHG | HEART RATE: 85 BPM | TEMPERATURE: 98 F | OXYGEN SATURATION: 95 % | RESPIRATION RATE: 14 BRPM | DIASTOLIC BLOOD PRESSURE: 60 MMHG

## 2018-11-16 DIAGNOSIS — Z87.39 PERSONAL HISTORY OF OTHER DISEASES OF THE MUSCULOSKELETAL SYSTEM AND CONNECTIVE TISSUE: ICD-10-CM

## 2018-11-16 DIAGNOSIS — M19.011 PRIMARY OSTEOARTHRITIS, RIGHT SHOULDER: ICD-10-CM

## 2018-11-16 DIAGNOSIS — Z86.73 PERSONAL HISTORY OF TRANSIENT ISCHEMIC ATTACK (TIA), AND CEREBRAL INFARCTION W/OUT RESIDUAL DEFICITS: ICD-10-CM

## 2018-11-16 DIAGNOSIS — M19.012 PRIMARY OSTEOARTHRITIS, RIGHT SHOULDER: ICD-10-CM

## 2018-11-16 PROCEDURE — G0506: CPT

## 2018-11-16 PROCEDURE — 99345 HOME/RES VST NEW HIGH MDM 75: CPT | Mod: 25,Q5

## 2018-11-19 ENCOUNTER — LABORATORY RESULT (OUTPATIENT)
Age: 83
End: 2018-11-19

## 2018-11-25 ENCOUNTER — TRANSCRIPTION ENCOUNTER (OUTPATIENT)
Age: 83
End: 2018-11-25

## 2018-11-26 ENCOUNTER — APPOINTMENT (OUTPATIENT)
Age: 83
End: 2018-11-26

## 2018-11-26 VITALS
OXYGEN SATURATION: 96 % | DIASTOLIC BLOOD PRESSURE: 60 MMHG | TEMPERATURE: 98 F | RESPIRATION RATE: 14 BRPM | SYSTOLIC BLOOD PRESSURE: 128 MMHG | HEART RATE: 65 BPM

## 2018-11-27 LAB
BASOPHILS # BLD AUTO: 0.03 K/UL
BASOPHILS NFR BLD AUTO: 0.3 %
EOSINOPHIL # BLD AUTO: 0.31 K/UL
EOSINOPHIL NFR BLD AUTO: 3.6 %
HCT VFR BLD CALC: 36.7 %
HGB BLD-MCNC: 11.6 G/DL
IMM GRANULOCYTES NFR BLD AUTO: 0.1 %
LYMPHOCYTES # BLD AUTO: 3 K/UL
LYMPHOCYTES NFR BLD AUTO: 34.6 %
MAN DIFF?: NORMAL
MCHC RBC-ENTMCNC: 25.5 PG
MCHC RBC-ENTMCNC: 31.6 GM/DL
MCV RBC AUTO: 80.7 FL
MONOCYTES # BLD AUTO: 0.57 K/UL
MONOCYTES NFR BLD AUTO: 6.6 %
NEUTROPHILS # BLD AUTO: 4.74 K/UL
NEUTROPHILS NFR BLD AUTO: 54.8 %
PLATELET # BLD AUTO: 248 K/UL
RBC # BLD: 4.55 M/UL
RBC # FLD: 17.6 %
WBC # FLD AUTO: 8.66 K/UL

## 2018-12-17 ENCOUNTER — APPOINTMENT (OUTPATIENT)
Dept: HOME HEALTH SERVICES | Facility: HOME HEALTH | Age: 83
End: 2018-12-17

## 2018-12-26 ENCOUNTER — APPOINTMENT (OUTPATIENT)
Dept: HOME HEALTH SERVICES | Facility: HOME HEALTH | Age: 83
End: 2018-12-26

## 2019-01-16 DIAGNOSIS — Z74.01 BED CONFINEMENT STATUS: ICD-10-CM

## 2019-01-21 PROBLEM — Z74.01 BEDBOUND: Status: ACTIVE | Noted: 2019-01-21

## 2019-02-11 ENCOUNTER — OTHER (OUTPATIENT)
Age: 84
End: 2019-02-11

## 2019-02-13 ENCOUNTER — MEDICATION RENEWAL (OUTPATIENT)
Age: 84
End: 2019-02-13

## 2019-02-13 RX ORDER — ASPIRIN 81 MG
81 TABLET, DELAYED RELEASE (ENTERIC COATED) ORAL
Refills: 0 | Status: DISCONTINUED | COMMUNITY
End: 2019-02-13

## 2019-02-22 ENCOUNTER — OTHER (OUTPATIENT)
Age: 84
End: 2019-02-22

## 2019-03-04 ENCOUNTER — APPOINTMENT (OUTPATIENT)
Dept: HOME HEALTH SERVICES | Facility: HOME HEALTH | Age: 84
End: 2019-03-04

## 2019-03-05 ENCOUNTER — APPOINTMENT (OUTPATIENT)
Dept: HOME HEALTH SERVICES | Facility: HOME HEALTH | Age: 84
End: 2019-03-05

## 2019-03-13 ENCOUNTER — MEDICATION RENEWAL (OUTPATIENT)
Age: 84
End: 2019-03-13

## 2019-03-19 ENCOUNTER — APPOINTMENT (OUTPATIENT)
Dept: HOME HEALTH SERVICES | Facility: HOME HEALTH | Age: 84
End: 2019-03-19

## 2019-03-29 ENCOUNTER — OTHER (OUTPATIENT)
Age: 84
End: 2019-03-29

## 2019-04-12 ENCOUNTER — APPOINTMENT (OUTPATIENT)
Dept: HOME HEALTH SERVICES | Facility: HOME HEALTH | Age: 84
End: 2019-04-12
Payer: MEDICARE

## 2019-04-12 VITALS
TEMPERATURE: 98 F | DIASTOLIC BLOOD PRESSURE: 78 MMHG | SYSTOLIC BLOOD PRESSURE: 142 MMHG | RESPIRATION RATE: 14 BRPM | OXYGEN SATURATION: 98 % | HEART RATE: 68 BPM

## 2019-04-12 DIAGNOSIS — Z78.9 OTHER SPECIFIED HEALTH STATUS: ICD-10-CM

## 2019-04-12 DIAGNOSIS — N95.0 POSTMENOPAUSAL BLEEDING: ICD-10-CM

## 2019-04-12 DIAGNOSIS — D64.9 ANEMIA, UNSPECIFIED: ICD-10-CM

## 2019-04-12 DIAGNOSIS — Z87.898 PERSONAL HISTORY OF OTHER SPECIFIED CONDITIONS: ICD-10-CM

## 2019-04-12 PROCEDURE — 99350 HOME/RES VST EST HIGH MDM 60: CPT

## 2019-04-12 RX ORDER — GLIPIZIDE 10 MG/1
10 TABLET ORAL
Qty: 180 | Refills: 2 | Status: DISCONTINUED | COMMUNITY
Start: 2018-11-16 | End: 2019-04-12

## 2019-04-12 RX ORDER — APIXABAN 2.5 MG/1
2.5 TABLET, FILM COATED ORAL
Qty: 180 | Refills: 3 | Status: DISCONTINUED | COMMUNITY
Start: 2018-11-16 | End: 2019-04-12

## 2019-04-12 NOTE — LETTER HEADER
[Care Plan reviewed and provided to patient/caregiver] : Care plan reviewed and provided to patient/caregiver [Care Plan managed/Care coordinated by: ___] : Care plan managed/Care coordinated by: [unfilled] [Initiation or substantial revisions made to care plan involving mod/high medical decision making for complex CCM] : Initiation or substantial revisions made to care plan involving mod/high medical decision making for complex CCM [Patient/Caregiver agrees to have other providers send summary of their care to this office] : Patient/caregiver agrees to have other providers send summary of their care to this office [Care Plan reviewed every ___ weeks] : Care plan reviewed every [unfilled] weeks

## 2019-04-18 ENCOUNTER — LABORATORY RESULT (OUTPATIENT)
Age: 84
End: 2019-04-18

## 2019-04-18 NOTE — REVIEW OF SYSTEMS
[Fatigue] : fatigue [Muscle Weakness] : muscle weakness [Memory Loss] : memory loss [Negative] : Eyes [FreeTextEntry4] : wax build up [de-identified] : as noted in HPI

## 2019-04-18 NOTE — REASON FOR VISIT
[Follow-Up] : a follow-up visit [Family Member] : family member [Formal Caregiver] : formal caregiver [Pre-Visit Preparation] : pre-visit preparation was done [Intercurrent Specialty/Sub-specialty Visits] : the patient has no intercurrent specialty/sub-specialty visits [FreeTextEntry1] : for chronic conditions

## 2019-04-18 NOTE — COUNSELING
[Non - Smoker] : non-smoker [Not Indicated] : not indicated [Minimize unnecessary interventions] : minimize unnecessary interventions [Maintain functional ability] : maintain functional ability [de-identified] : proxy not available for discussion

## 2019-04-18 NOTE — PHYSICAL EXAM
[No Acute Distress] : no acute distress [Well Nourished] : well nourished [Well Developed] : well developed [Normal Sclera/Conjunctiva] : normal sclera/conjunctiva [EOMI] : extra ocular movement intact [Normal Outer Ear/Nose] : the ears and nose were normal in appearance [Normal Oropharynx] : the oropharynx was normal [No Respiratory Distress] : no respiratory distress [Clear to Auscultation] : lungs were clear to auscultation bilaterally [No Accessory Muscle Use] : no accessory muscle use [Normal Rate] : heart rate was normal  [Normal S1, S2] : normal S1 and S2 [Regular Rhythm] : with a regular rhythm [No Murmurs] : no murmurs heard [No Edema] : there was no peripheral edema [Non Tender] : non-tender [Normal Bowel Sounds] : normal bowel sounds [Soft] : abdomen soft [Not Distended] : not distended [No Masses] : no abdominal mass palpated [Normal Anterior Cervical Nodes] : no anterior cervical lymphadenopathy [Normal Post Cervical Nodes] : no posterior cervical lymphadenopathy [No CVA Tenderness] : no ~M costovertebral angle tenderness [No Spinal Tenderness] : no spinal tenderness [Normal Gait] : normal gait [Normal Strength/Tone] : muscle strength and tone were normal [No Rash] : no rash [Normal Affect] : the affect was normal [No JVD] : no jugular venous distention [de-identified] : non-verbal today  [de-identified] : 1.5 x 5 x 0.5cm stage 3 pressure ulcer on sacrum with no odor or drainange [de-identified] : non-verbal - unable to assess

## 2019-04-18 NOTE — HISTORY OF PRESENT ILLNESS
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 - has had no further vaginal bleeding\par \par DM: A1c 8.2 in November- on Januvia and levemir 6 units at bed time;  Glucoses are checked twice daily. Fasting glucoses are <140- No foot ulcers. \par \par HTN/HLD- BP controlled with  carvedilol, losartan- carvedilol was increased to 6.25 BID in December, but daughters still giving 3/125 BID- reports BP can fluctuate up to 160's systolic; on pravastatin\par \par Hypothyroid: TSH 13.04 in 11/18- on levothyroxine\par \par Asthma: well-controlled on Singulair.  \par \par

## 2019-04-19 ENCOUNTER — CLINICAL ADVICE (OUTPATIENT)
Age: 84
End: 2019-04-19

## 2019-04-23 ENCOUNTER — CLINICAL ADVICE (OUTPATIENT)
Age: 84
End: 2019-04-23

## 2019-04-23 ENCOUNTER — INPATIENT (INPATIENT)
Facility: HOSPITAL | Age: 84
LOS: 4 days | Discharge: HOME | DRG: 643 | End: 2019-04-28
Attending: HOSPITALIST | Admitting: HOSPITALIST
Payer: COMMERCIAL

## 2019-04-23 ENCOUNTER — MOBILE ON CALL (OUTPATIENT)
Age: 84
End: 2019-04-23

## 2019-04-23 ENCOUNTER — MEDICATION RENEWAL (OUTPATIENT)
Age: 84
End: 2019-04-23

## 2019-04-23 VITALS
OXYGEN SATURATION: 98 % | SYSTOLIC BLOOD PRESSURE: 149 MMHG | DIASTOLIC BLOOD PRESSURE: 65 MMHG | TEMPERATURE: 98 F | HEART RATE: 62 BPM | RESPIRATION RATE: 18 BRPM

## 2019-04-23 DIAGNOSIS — E87.1 HYPO-OSMOLALITY AND HYPONATREMIA: ICD-10-CM

## 2019-04-23 DIAGNOSIS — Z71.89 OTHER SPECIFIED COUNSELING: ICD-10-CM

## 2019-04-23 DIAGNOSIS — E11.9 TYPE 2 DIABETES MELLITUS WITHOUT COMPLICATIONS: ICD-10-CM

## 2019-04-23 DIAGNOSIS — I48.91 UNSPECIFIED ATRIAL FIBRILLATION: ICD-10-CM

## 2019-04-23 DIAGNOSIS — I10 ESSENTIAL (PRIMARY) HYPERTENSION: ICD-10-CM

## 2019-04-23 DIAGNOSIS — N18.3 CHRONIC KIDNEY DISEASE, STAGE 3 (MODERATE): ICD-10-CM

## 2019-04-23 DIAGNOSIS — E78.5 HYPERLIPIDEMIA, UNSPECIFIED: ICD-10-CM

## 2019-04-23 DIAGNOSIS — E03.9 HYPOTHYROIDISM, UNSPECIFIED: ICD-10-CM

## 2019-04-23 DIAGNOSIS — D72.829 ELEVATED WHITE BLOOD CELL COUNT, UNSPECIFIED: ICD-10-CM

## 2019-04-23 DIAGNOSIS — Z29.9 ENCOUNTER FOR PROPHYLACTIC MEASURES, UNSPECIFIED: ICD-10-CM

## 2019-04-23 LAB
ALBUMIN SERPL ELPH-MCNC: 3.3 G/DL
ALBUMIN SERPL ELPH-MCNC: 3.6 G/DL — SIGNIFICANT CHANGE UP (ref 3.3–5)
ALP SERPL-CCNC: 77 U/L — SIGNIFICANT CHANGE UP (ref 40–120)
ALT FLD-CCNC: 11 U/L — SIGNIFICANT CHANGE UP (ref 10–45)
ANION GAP SERPL CALC-SCNC: 10 MMOL/L — SIGNIFICANT CHANGE UP (ref 5–17)
ANION GAP SERPL CALC-SCNC: 11 MMOL/L — SIGNIFICANT CHANGE UP (ref 5–17)
ANION GAP SERPL CALC-SCNC: 14 MMOL/L
APPEARANCE UR: ABNORMAL
AST SERPL-CCNC: 16 U/L — SIGNIFICANT CHANGE UP (ref 10–40)
BACTERIA # UR AUTO: ABNORMAL
BASE EXCESS BLDV CALC-SCNC: 3.6 MMOL/L — HIGH (ref -2–2)
BASOPHILS # BLD AUTO: 0.1 K/UL — SIGNIFICANT CHANGE UP (ref 0–0.2)
BASOPHILS NFR BLD AUTO: 0.9 % — SIGNIFICANT CHANGE UP (ref 0–2)
BILIRUB SERPL-MCNC: 0.4 MG/DL — SIGNIFICANT CHANGE UP (ref 0.2–1.2)
BILIRUB UR-MCNC: NEGATIVE — SIGNIFICANT CHANGE UP
BUN SERPL-MCNC: 13 MG/DL — SIGNIFICANT CHANGE UP (ref 7–23)
BUN SERPL-MCNC: 13 MG/DL — SIGNIFICANT CHANGE UP (ref 7–23)
BUN SERPL-MCNC: 14 MG/DL
CA-I SERPL-SCNC: 1.21 MMOL/L — SIGNIFICANT CHANGE UP (ref 1.12–1.3)
CALCIUM SERPL-MCNC: 8.9 MG/DL
CALCIUM SERPL-MCNC: 9.1 MG/DL — SIGNIFICANT CHANGE UP (ref 8.4–10.5)
CALCIUM SERPL-MCNC: 9.3 MG/DL — SIGNIFICANT CHANGE UP (ref 8.4–10.5)
CHLORIDE BLDV-SCNC: 84 MMOL/L — LOW (ref 96–108)
CHLORIDE SERPL-SCNC: 83 MMOL/L
CHLORIDE SERPL-SCNC: 84 MMOL/L — LOW (ref 96–108)
CHLORIDE SERPL-SCNC: 85 MMOL/L — LOW (ref 96–108)
CO2 BLDV-SCNC: 32 MMOL/L — HIGH (ref 22–30)
CO2 SERPL-SCNC: 26 MMOL/L
CO2 SERPL-SCNC: 26 MMOL/L — SIGNIFICANT CHANGE UP (ref 22–31)
CO2 SERPL-SCNC: 26 MMOL/L — SIGNIFICANT CHANGE UP (ref 22–31)
COLOR SPEC: YELLOW — SIGNIFICANT CHANGE UP
CREAT ?TM UR-MCNC: 35 MG/DL — SIGNIFICANT CHANGE UP
CREAT SERPL-MCNC: 1.21 MG/DL — SIGNIFICANT CHANGE UP (ref 0.5–1.3)
CREAT SERPL-MCNC: 1.24 MG/DL
CREAT SERPL-MCNC: 1.3 MG/DL — SIGNIFICANT CHANGE UP (ref 0.5–1.3)
DIFF PNL FLD: ABNORMAL
EOSINOPHIL # BLD AUTO: 0.2 K/UL — SIGNIFICANT CHANGE UP (ref 0–0.5)
EOSINOPHIL NFR BLD AUTO: 2.2 % — SIGNIFICANT CHANGE UP (ref 0–6)
EPI CELLS # UR: SIGNIFICANT CHANGE UP
GAS PNL BLDV: 118 MMOL/L — CRITICAL LOW (ref 136–145)
GAS PNL BLDV: SIGNIFICANT CHANGE UP
GLUCOSE BLDV-MCNC: 158 MG/DL — HIGH (ref 70–99)
GLUCOSE SERPL-MCNC: 102 MG/DL — HIGH (ref 70–99)
GLUCOSE SERPL-MCNC: 114 MG/DL
GLUCOSE SERPL-MCNC: 153 MG/DL — HIGH (ref 70–99)
GLUCOSE UR QL: NEGATIVE — SIGNIFICANT CHANGE UP
HCO3 BLDV-SCNC: 30 MMOL/L — HIGH (ref 21–29)
HCT VFR BLD CALC: 37.6 % — SIGNIFICANT CHANGE UP (ref 34.5–45)
HCT VFR BLDA CALC: 38 % — LOW (ref 39–50)
HGB BLD CALC-MCNC: 12.4 G/DL — SIGNIFICANT CHANGE UP (ref 11.5–15.5)
HGB BLD-MCNC: 12.3 G/DL — SIGNIFICANT CHANGE UP (ref 11.5–15.5)
HYALINE CASTS # UR AUTO: 0 /LPF — SIGNIFICANT CHANGE UP (ref 0–2)
KETONES UR-MCNC: NEGATIVE — SIGNIFICANT CHANGE UP
LACTATE BLDV-MCNC: 1.9 MMOL/L — SIGNIFICANT CHANGE UP (ref 0.7–2)
LEUKOCYTE ESTERASE UR-ACNC: ABNORMAL
LYMPHOCYTES # BLD AUTO: 3.4 K/UL — HIGH (ref 1–3.3)
LYMPHOCYTES # BLD AUTO: 31.8 % — SIGNIFICANT CHANGE UP (ref 13–44)
MCHC RBC-ENTMCNC: 27 PG — SIGNIFICANT CHANGE UP (ref 27–34)
MCHC RBC-ENTMCNC: 32.7 GM/DL — SIGNIFICANT CHANGE UP (ref 32–36)
MCV RBC AUTO: 82.7 FL — SIGNIFICANT CHANGE UP (ref 80–100)
MONOCYTES # BLD AUTO: 0.7 K/UL — SIGNIFICANT CHANGE UP (ref 0–0.9)
MONOCYTES NFR BLD AUTO: 6.8 % — SIGNIFICANT CHANGE UP (ref 2–14)
NEUTROPHILS # BLD AUTO: 6.3 K/UL — SIGNIFICANT CHANGE UP (ref 1.8–7.4)
NEUTROPHILS NFR BLD AUTO: 58.4 % — SIGNIFICANT CHANGE UP (ref 43–77)
NITRITE UR-MCNC: NEGATIVE — SIGNIFICANT CHANGE UP
OSMOLALITY SERPL: 264 MOS/KG — LOW (ref 275–300)
OSMOLALITY UR: 243 MOS/KG — LOW (ref 300–900)
OTHER CELLS CSF MANUAL: 7 ML/DL — LOW (ref 18–22)
PCO2 BLDV: 58 MMHG — HIGH (ref 35–50)
PH BLDV: 7.34 — LOW (ref 7.35–7.45)
PH UR: 7 — SIGNIFICANT CHANGE UP (ref 5–8)
PHOSPHATE SERPL-MCNC: 3.7 MG/DL
PLATELET # BLD AUTO: 308 K/UL — SIGNIFICANT CHANGE UP (ref 150–400)
PO2 BLDV: 26 MMHG — SIGNIFICANT CHANGE UP (ref 25–45)
POTASSIUM BLDV-SCNC: 4.5 MMOL/L — SIGNIFICANT CHANGE UP (ref 3.5–5.3)
POTASSIUM SERPL-MCNC: 4.6 MMOL/L — SIGNIFICANT CHANGE UP (ref 3.5–5.3)
POTASSIUM SERPL-MCNC: 4.8 MMOL/L — SIGNIFICANT CHANGE UP (ref 3.5–5.3)
POTASSIUM SERPL-SCNC: 4.6 MMOL/L — SIGNIFICANT CHANGE UP (ref 3.5–5.3)
POTASSIUM SERPL-SCNC: 4.8 MMOL/L — SIGNIFICANT CHANGE UP (ref 3.5–5.3)
POTASSIUM SERPL-SCNC: 5.2 MMOL/L
POTASSIUM UR-SCNC: 35 MMOL/L — SIGNIFICANT CHANGE UP
PROT SERPL-MCNC: 6 G/DL — SIGNIFICANT CHANGE UP (ref 6–8.3)
PROT UR-MCNC: ABNORMAL
RBC # BLD: 4.55 M/UL — SIGNIFICANT CHANGE UP (ref 3.8–5.2)
RBC # FLD: 13.8 % — SIGNIFICANT CHANGE UP (ref 10.3–14.5)
RBC CASTS # UR COMP ASSIST: SIGNIFICANT CHANGE UP /HPF (ref 0–4)
SAO2 % BLDV: 42 % — LOW (ref 67–88)
SODIUM SERPL-SCNC: 121 MMOL/L — LOW (ref 135–145)
SODIUM SERPL-SCNC: 121 MMOL/L — LOW (ref 135–145)
SODIUM SERPL-SCNC: 123 MMOL/L
SODIUM UR-SCNC: 52 MMOL/L — SIGNIFICANT CHANGE UP
SP GR SPEC: 1.01 — SIGNIFICANT CHANGE UP (ref 1.01–1.02)
TSH SERPL-MCNC: 8.49 UIU/ML — HIGH (ref 0.27–4.2)
UROBILINOGEN FLD QL: NEGATIVE — SIGNIFICANT CHANGE UP
WBC # BLD: 10.7 K/UL — HIGH (ref 3.8–10.5)
WBC # FLD AUTO: 10.7 K/UL — HIGH (ref 3.8–10.5)
WBC UR QL: >50 /HPF — SIGNIFICANT CHANGE UP (ref 0–5)

## 2019-04-23 PROCEDURE — 93010 ELECTROCARDIOGRAM REPORT: CPT

## 2019-04-23 PROCEDURE — 99285 EMERGENCY DEPT VISIT HI MDM: CPT

## 2019-04-23 RX ORDER — SODIUM CHLORIDE 9 MG/ML
1000 INJECTION, SOLUTION INTRAVENOUS
Qty: 0 | Refills: 0 | Status: DISCONTINUED | OUTPATIENT
Start: 2019-04-23 | End: 2019-04-28

## 2019-04-23 RX ORDER — BUDESONIDE AND FORMOTEROL FUMARATE DIHYDRATE 160; 4.5 UG/1; UG/1
2 AEROSOL RESPIRATORY (INHALATION)
Qty: 0 | Refills: 0 | COMMUNITY

## 2019-04-23 RX ORDER — IPRATROPIUM/ALBUTEROL SULFATE 18-103MCG
3 AEROSOL WITH ADAPTER (GRAM) INHALATION EVERY 6 HOURS
Qty: 0 | Refills: 0 | Status: DISCONTINUED | OUTPATIENT
Start: 2019-04-23 | End: 2019-04-28

## 2019-04-23 RX ORDER — CARVEDILOL PHOSPHATE 80 MG/1
6.25 CAPSULE, EXTENDED RELEASE ORAL EVERY 12 HOURS
Qty: 0 | Refills: 0 | Status: DISCONTINUED | OUTPATIENT
Start: 2019-04-23 | End: 2019-04-26

## 2019-04-23 RX ORDER — DEXTROSE 50 % IN WATER 50 %
25 SYRINGE (ML) INTRAVENOUS ONCE
Qty: 0 | Refills: 0 | Status: DISCONTINUED | OUTPATIENT
Start: 2019-04-23 | End: 2019-04-28

## 2019-04-23 RX ORDER — INSULIN LISPRO 100/ML
VIAL (ML) SUBCUTANEOUS AT BEDTIME
Qty: 0 | Refills: 0 | Status: DISCONTINUED | OUTPATIENT
Start: 2019-04-23 | End: 2019-04-28

## 2019-04-23 RX ORDER — DEXTROSE 50 % IN WATER 50 %
15 SYRINGE (ML) INTRAVENOUS ONCE
Qty: 0 | Refills: 0 | Status: DISCONTINUED | OUTPATIENT
Start: 2019-04-23 | End: 2019-04-28

## 2019-04-23 RX ORDER — GLUCAGON INJECTION, SOLUTION 0.5 MG/.1ML
1 INJECTION, SOLUTION SUBCUTANEOUS ONCE
Qty: 0 | Refills: 0 | Status: DISCONTINUED | OUTPATIENT
Start: 2019-04-23 | End: 2019-04-28

## 2019-04-23 RX ORDER — INSULIN DETEMIR 100/ML (3)
15 INSULIN PEN (ML) SUBCUTANEOUS
Qty: 0 | Refills: 0 | COMMUNITY

## 2019-04-23 RX ORDER — ESCITALOPRAM OXALATE 10 MG/1
10 TABLET, FILM COATED ORAL DAILY
Qty: 0 | Refills: 0 | Status: DISCONTINUED | OUTPATIENT
Start: 2019-04-23 | End: 2019-04-24

## 2019-04-23 RX ORDER — AMLODIPINE BESYLATE 2.5 MG/1
10 TABLET ORAL DAILY
Qty: 0 | Refills: 0 | Status: DISCONTINUED | OUTPATIENT
Start: 2019-04-23 | End: 2019-04-28

## 2019-04-23 RX ORDER — FAMOTIDINE 10 MG/ML
20 INJECTION INTRAVENOUS DAILY
Qty: 0 | Refills: 0 | Status: DISCONTINUED | OUTPATIENT
Start: 2019-04-23 | End: 2019-04-28

## 2019-04-23 RX ORDER — INSULIN GLARGINE 100 [IU]/ML
6 INJECTION, SOLUTION SUBCUTANEOUS AT BEDTIME
Qty: 0 | Refills: 0 | Status: DISCONTINUED | OUTPATIENT
Start: 2019-04-23 | End: 2019-04-24

## 2019-04-23 RX ORDER — DEXTROSE 50 % IN WATER 50 %
12.5 SYRINGE (ML) INTRAVENOUS ONCE
Qty: 0 | Refills: 0 | Status: DISCONTINUED | OUTPATIENT
Start: 2019-04-23 | End: 2019-04-28

## 2019-04-23 RX ORDER — LOSARTAN POTASSIUM 100 MG/1
50 TABLET, FILM COATED ORAL DAILY
Qty: 0 | Refills: 0 | Status: DISCONTINUED | OUTPATIENT
Start: 2019-04-23 | End: 2019-04-28

## 2019-04-23 RX ORDER — ATORVASTATIN CALCIUM 80 MG/1
10 TABLET, FILM COATED ORAL AT BEDTIME
Qty: 0 | Refills: 0 | Status: DISCONTINUED | OUTPATIENT
Start: 2019-04-23 | End: 2019-04-28

## 2019-04-23 RX ORDER — MONTELUKAST 4 MG/1
10 TABLET, CHEWABLE ORAL DAILY
Qty: 0 | Refills: 0 | Status: DISCONTINUED | OUTPATIENT
Start: 2019-04-23 | End: 2019-04-28

## 2019-04-23 RX ORDER — CARVEDILOL PHOSPHATE 80 MG/1
1 CAPSULE, EXTENDED RELEASE ORAL
Qty: 0 | Refills: 0 | COMMUNITY

## 2019-04-23 RX ORDER — INSULIN LISPRO 100/ML
VIAL (ML) SUBCUTANEOUS
Qty: 0 | Refills: 0 | Status: DISCONTINUED | OUTPATIENT
Start: 2019-04-23 | End: 2019-04-28

## 2019-04-23 RX ORDER — LEVOTHYROXINE SODIUM 125 MCG
125 TABLET ORAL DAILY
Qty: 0 | Refills: 0 | Status: DISCONTINUED | OUTPATIENT
Start: 2019-04-23 | End: 2019-04-28

## 2019-04-23 RX ORDER — HEPARIN SODIUM 5000 [USP'U]/ML
5000 INJECTION INTRAVENOUS; SUBCUTANEOUS EVERY 8 HOURS
Qty: 0 | Refills: 0 | Status: DISCONTINUED | OUTPATIENT
Start: 2019-04-23 | End: 2019-04-28

## 2019-04-23 RX ORDER — ASPIRIN/CALCIUM CARB/MAGNESIUM 324 MG
81 TABLET ORAL DAILY
Qty: 0 | Refills: 0 | Status: DISCONTINUED | OUTPATIENT
Start: 2019-04-23 | End: 2019-04-28

## 2019-04-23 RX ADMIN — FAMOTIDINE 20 MILLIGRAM(S): 10 INJECTION INTRAVENOUS at 18:40

## 2019-04-23 RX ADMIN — ATORVASTATIN CALCIUM 10 MILLIGRAM(S): 80 TABLET, FILM COATED ORAL at 22:41

## 2019-04-23 RX ADMIN — HEPARIN SODIUM 5000 UNIT(S): 5000 INJECTION INTRAVENOUS; SUBCUTANEOUS at 22:41

## 2019-04-23 RX ADMIN — INSULIN GLARGINE 6 UNIT(S): 100 INJECTION, SOLUTION SUBCUTANEOUS at 22:42

## 2019-04-23 RX ADMIN — CARVEDILOL PHOSPHATE 6.25 MILLIGRAM(S): 80 CAPSULE, EXTENDED RELEASE ORAL at 18:40

## 2019-04-23 RX ADMIN — MONTELUKAST 10 MILLIGRAM(S): 4 TABLET, CHEWABLE ORAL at 22:41

## 2019-04-23 RX ADMIN — AMLODIPINE BESYLATE 10 MILLIGRAM(S): 2.5 TABLET ORAL at 22:41

## 2019-04-23 NOTE — ED ADULT NURSE NOTE - OBJECTIVE STATEMENT
89 y/o female hx DM, Hypothyroid on Synthroid, HTN, HLD, presents to the ED via EMS from home c/o abnormal labs. Pt daughter states that she had blood work done outpatient last week and diagnosed with hyponatremia of 123, advised by PCP to come to ED, hx of hyponatremia with admission in past. Daughter states "pt is always sleeping" but states that is baseline, baseline orientation at A&Ox1. Denies fever, chills, n/v, weakness, abd pain, diarrhea/constipation, numbness/tingling, urinary s/s. Pt A&Ox1, in no respiratory distress, no CP, full ROM, neuro intact, abd soft nontender nondistended. stage 1 sacral ulcer in placed at time of admission, pressure pad placed on sacrum. PT safety maintained with family at bedside, call bell within reach and bed in the lowest position.

## 2019-04-23 NOTE — H&P ADULT - PROBLEM SELECTOR PLAN 1
- Na 121 - Na 121, euvolemic on exam   - outpatient Na 126 --> 123 after increased fluid intake, TSH on allscripts 10.7  - serum Osm 264 > urine osm 243, urine sodium 52, Urine Specific gravity wnl.   - likely euvolemic hyponatremia in setting of TSH abnormalities   - will fluid restrict and increase home dose of levothyroxine 112mcg daily to 125mcg daily   - monitor BMP [id number], repeat TSH as an outpatient in 4-6 weeks, AM cortisol - Na 121, euvolemic on exam  - outpatient Na 126 --> 123 after increased fluid intake, TSH on allscripts 10.7  - serum Osm 264 > urine osm 243, urine sodium 52, Urine Specific gravity wnl.   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 with no neurologic abnormalities  - will consult nephrology - Na 121, euvolemic on exam  - outpatient Na 126 --> 123 after increased fluid intake, TSH on Allscripts 10.7  - serum osm 264 > urine osm 243, urine sodium 52, Urine Specific gravity wnl.   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

## 2019-04-23 NOTE — H&P ADULT - NSHPLABSRESULTS_GEN_ALL_CORE
12.3   10.7  )-----------( 308      ( 23 Apr 2019 13:59 )             37.6     04-23    121<L>  |  84<L>  |  13  ----------------------------<  153<H>  4.8   |  26  |  1.30    Ca    9.3      23 Apr 2019 13:59    TPro  6.0  /  Alb  3.6  /  TBili  0.4  /  DBili  x   /  AST  16  /  ALT  11  /  AlkPhos  77  04-23 labs personally reviewed:             12.3   10.7  )-----------( 308      ( 23 Apr 2019 13:59 )             37.6     04-23    121<L>  |  84<L>  |  13  ----------------------------<  153<H>  4.8   |  26  |  1.30    Ca    9.3      23 Apr 2019 13:59    TPro  6.0  /  Alb  3.6  /  TBili  0.4  /  DBili  x   /  AST  16  /  ALT  11  /  AlkPhos  77  04-23

## 2019-04-23 NOTE — H&P ADULT - PROBLEM SELECTOR PLAN 8
- A1C 9.6% in 10/2018, recheck in AM  - per family pt. takes Levemir 10 units at bedtime, on Allscripts ordered for 6 units  - will start with Lantus 6 units at bedtime, titrate as needed, FS and SSI qac/hs  - hold oral agents (Januvia 25mg daily)

## 2019-04-23 NOTE — CONSULT NOTE ADULT - ATTENDING COMMENTS
Multifactorial hyponatremia; Acute on chronic - was here in Oct 2018  Poor solute intake  Increased ADH state- ?SSRI  Increased free water intake  TSH slightly low and cortisol ok  Would free water restrict  Encourage po otherwise will likely need Na tabs vs urea  SSRI ? if that can be dc vs observe while on it

## 2019-04-23 NOTE — ED PROVIDER NOTE - CONSTITUTIONAL, MLM
normal... appears stated age. nontoxic appearing. awake, alert, oriented to person, but NOT place, time/situation and in no apparent distress.

## 2019-04-23 NOTE — CONSULT NOTE ADULT - ASSESSMENT
89 year old female with history of HTN, HLD, DM, Afib, Hypothyroidism, Dementia at baseline and declining mental status who presents to the hospital after lab results showed abnormal sodium.

## 2019-04-23 NOTE — H&P ADULT - PROBLEM SELECTOR PLAN 5
- CKD stage 3 (baseline SCr 1.1-1.3)  - currently at baseline   - monitor SCr, renally adjust medications

## 2019-04-23 NOTE — H&P ADULT - HISTORY OF PRESENT ILLNESS
History obtained from daughter at bedside as pt. has dementia, confused at baseline, answers simple questions.     89F hx of dementia (AAOx1), recurrent hyponatremia, hypothyroidism, afib on Eliquis, HTN, HLD, T2DM (A1C 9.6% 10/2018), asthma who presents with abnormal outpatient labs (hyponatremia 123). Pt. had routine outpatient labwork done on Friday and was noted to be hyponatremic to 123. Blood work repeated yesterday, Na confirmed to be 123 and advised to present to EAD for correction. Mental status is at baseline, no fevers, chills, respiratory distress, n/v/d, change in urinary frequency.     In the ED   VS T 97.8, HR 62, /65, RR 18, 98% on RA  Labs notable for WBC 10.7, Na 121, Serum Osm Low 264, Urine Na 52 History obtained from daughter at bedside as pt. has dementia, confused at baseline, answers simple questions.     89F hx of dementia (AAOx1, primarily bedbound, lives at home with 24 hour HHA ), recurrent hyponatremia, hypothyroidism, afib (no longer on Eliquis after dark stools 4 months ago), HTN, HLD, T2DM (A1C 9.6% 10/2018), asthma who presents with abnormal outpatient labs (hyponatremia 123). Pt. is part of [facility] program, had routine labs draw on Thursday 4/18, Na noted to be 126 and TSH elevated to 10. Pt. currently takes levothyroxine 112mcg daily, was advised to take increased dose of 125mcg however new prescription was never sent to pharmacy. No recommendations made for fluid intake. Of note, pt was hospitalized at [facility] in September for hyponatremia and was recommended for fluid restriction. Pts. son is a physician, recommended fluid restriction as well however patient's daughter who is a nurse asked HHA to give more PO fluids, after which pt. drank more water/gatorade (unclear how much). Labs were repeated today, Na noted to be 123 and pt. advised to come to ED for correction. Per daughter pt. has not had chills, respiratory distress, URI symptoms, abdominal pain, vomiting/diarrhea, change in urinary frequency.     VS T 97.8, HR 62, /65, RR 18, 98% on RA  Labs notable for WBC 10.7, Na 121, Serum Osm Low 264, Urine Na 52 History obtained from daughter at bedside as pt. has dementia, confused at baseline, answers simple questions.     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 Pt. is part of [facility] house calls program, had routine labs draw on Thursday 4/18, Na noted to be 126 and TSH elevated to 10. Pt. currently takes levothyroxine 112mcg daily, was advised to take increased dose of 125mcg however new prescription was never sent to pharmacy. No recommendations made for fluid intake. Of note, pt was hospitalized at [facility] in September for hyponatremia and was recommended for fluid restriction. Pts. son is a physician, recommended fluid restriction as well however patient's daughter who is a nurse asked HHA to give more PO fluids, after which pt. drank more water/gatorade (unclear how much). Labs were repeated today, Na noted to be 123 and pt. advised to come to ED for correction. Per daughter pt. has not had chills, respiratory distress, URI symptoms, abdominal pain, vomiting/diarrhea, change in urinary frequency.     VS T 97.8, HR 62, /65, RR 18, 98% on RA  Labs notable for WBC 10.7, Na 121, Serum Osm Low 264, Urine Na 52

## 2019-04-23 NOTE — H&P ADULT - NSHPREVIEWOFSYSTEMS_GEN_ALL_CORE
REVIEW OF SYSTEMS:    CONSTITUTIONAL: No weakness, fevers or chills  EYES/ENT: No visual changes;  No vertigo or throat pain   NECK: No pain or stiffness  RESPIRATORY: No cough, wheezing, hemoptysis; No shortness of breath  CARDIOVASCULAR: No chest pain or palpitations  GASTROINTESTINAL: No abdominal or epigastric pain. No nausea, vomiting, or hematemesis; No diarrhea or constipation. No melena or hematochezia.  GENITOURINARY: No dysuria, frequency or hematuria  NEUROLOGICAL: No numbness or weakness  SKIN: No itching, rashes unable to complete due to mental status, AAOx1, minimally verbal at baseline

## 2019-04-23 NOTE — ED PROVIDER NOTE - CLINICAL SUMMARY MEDICAL DECISION MAKING FREE TEXT BOX
Roxanna: Patient nonverbal at baseline here with hyponatremia found on outside labs. Per daughter at baseline, reports this has happened before and possibly related to over intake of fluids. no change in baseline per daughter. not more fatigue, not c/o anything to daugther. will get labs, urine lytes, reassess.

## 2019-04-23 NOTE — ED ADULT NURSE REASSESSMENT NOTE - NS ED NURSE REASSESS COMMENT FT1
Pt straight cathed using sterile technique. 2 RNs present to confirm sterility. Pt tolerated procedure well. Sterile specimen collected. UA and cup  sent as ordered. Approx 300cc yellow cloudy urine output noted to bag.

## 2019-04-23 NOTE — ED PROVIDER NOTE - OBJECTIVE STATEMENT
89F hx htn, hld, dm, hypothyroid, dementia, (axox1 at baseline) p/w abnl lab result. hx per daughter at bedside. Patient had routine outpatient bloodwork friday. Noted to be hyponatremic to 123 and had it confirmed with repeat bloodwork yesterday. Recommend for admission for correction. Denies recent illness, vomiting, diarrhea, fevers. Patient is at baseline mental status per daughter. Patient is very confused at baseline but able to answer simple questions. requires assistance with all ADLs.

## 2019-04-23 NOTE — ED ADULT NURSE NOTE - NSIMPLEMENTINTERV_GEN_ALL_ED
Implemented All Fall with Harm Risk Interventions:  [place] to call system. Call bell, personal items and telephone within reach. Instruct patient to call for assistance. Room bathroom lighting operational. Non-slip footwear when patient is off stretcher. Physically safe environment: no spills, clutter or unnecessary equipment. Stretcher in lowest position, wheels locked, appropriate side rails in place. Provide visual cue, wrist band, yellow gown, etc. Monitor gait and stability. Monitor for mental status changes and reorient to person, place, and time. Review medications for side effects contributing to fall risk. Reinforce activity limits and safety measures with patient and family. Provide visual clues: red socks.

## 2019-04-23 NOTE — H&P ADULT - PROBLEM SELECTOR PLAN 2
- TSH on 4/18 outpatient labs elevated to 10  - pt. currently takes levothyroxine 112mcg daily, was recommended to take increased dose however new Rx not sent per family  - will increase to 125mcg daily, repeat TFTs as outpatient in 4-6 weeks

## 2019-04-23 NOTE — H&P ADULT - PROBLEM SELECTOR PLAN 9
- pt. has three children (2 daughters and 1 son)  - pt. has dementia, son who is a physician is HCP, family will bring in documentation  - they do not have a living will or MOLST form, daughter at bedside will discuss with other siblings regarding code status but presumes code status to be DNR/DNI

## 2019-04-23 NOTE — CONSULT NOTE ADULT - SUBJECTIVE AND OBJECTIVE BOX
[facility] Division of Kidney Diseases & Hypertension  INITIAL CONSULT NOTE  [phone]--------------------------------------------------------------------------------  HPI: 89 year old female with history of HTN, HLD, DM, Afib, Hypothyroidism, Dementia at baseline and declining mental status who presents to the hospital after lab results showed abnormal sodium. Patient lives in [place] with home health aides and gets labs done and last week her sodium was noted to be 126. Patient had repeat labs done Monday which showed sodium of 121 and was directed to the ED for further management. Patient at baseline answers questions intermittently however has had declining mental function. Family who is at bedside states she has not worsening confusion, lethargy or acutely worsening mental status in the last week. Patient is not on diuretics, and not been taking excessive NSAIDs. Patient is on Lexapro. No heart failure or liver failure. Patient does have noted elevated creatinine and family states she has had "bad kidneys" for some time. Patient has not had decreased appetite or increased fluid intake. Otherwise patient has been at baseline health. Nephrology consulted for hyponatremia.      PAST HISTORY  --------------------------------------------------------------------------------  PAST MEDICAL & SURGICAL HISTORY:  DM (diabetes mellitus)  Hypothyroidism  HLD (hyperlipidemia)  HTN (hypertension)  Atrial fibrillation  Hypothyroid  Diabetes  Hypercholesteremia  Asthma  Hypertension  No significant past surgical history  No significant past surgical history    FAMILY HISTORY:  No pertinent family history in first degree relatives    PAST SOCIAL HISTORY:    ALLERGIES & MEDICATIONS  --------------------------------------------------------------------------------  Allergies    No Known Allergies    Intolerances      Standing Inpatient Medications  amLODIPine   Tablet 10 milliGRAM(s) Oral daily  aspirin  chewable 81 milliGRAM(s) Oral daily  atorvastatin 10 milliGRAM(s) Oral at bedtime  carvedilol 6.25 milliGRAM(s) Oral every 12 hours  dextrose 5%. 1000 milliLiter(s) IV Continuous <Continuous>  dextrose 50% Injectable 12.5 Gram(s) IV Push once  dextrose 50% Injectable 25 Gram(s) IV Push once  dextrose 50% Injectable 25 Gram(s) IV Push once  escitalopram 10 milliGRAM(s) Oral daily  famotidine    Tablet 20 milliGRAM(s) Oral daily  heparin  Injectable 5000 Unit(s) SubCutaneous every 8 hours  insulin glargine Injectable (LANTUS) 6 Unit(s) SubCutaneous at bedtime  insulin lispro (HumaLOG) corrective regimen sliding scale   SubCutaneous three times a day before meals  insulin lispro (HumaLOG) corrective regimen sliding scale   SubCutaneous at bedtime  levothyroxine 125 MICROGram(s) Oral daily  losartan 50 milliGRAM(s) Oral daily  montelukast 10 milliGRAM(s) Oral daily    PRN Inpatient Medications  ALBUTerol/ipratropium for Nebulization 3 milliLiter(s) Nebulizer every 6 hours PRN  dextrose 40% Gel 15 Gram(s) Oral once PRN  glucagon  Injectable 1 milliGRAM(s) IntraMuscular once PRN      REVIEW OF SYSTEMS: Unable to obtain 2/2 clinical condition.    VITALS/PHYSICAL EXAM  --------------------------------------------------------------------------------  T(C): 36.6 (04-23-19 @ 20:59), Max: 36.7 (04-23-19 @ 13:43)  HR: 62 (04-23-19 @ 20:59) (60 - 77)  BP: 159/75 (04-23-19 @ 20:59) (100/72 - 165/71)  RR: 17 (04-23-19 @ 20:59) (16 - 18)  SpO2: 99% (04-23-19 @ 20:59) (97% - 99%)  Wt(kg): --  Height (cm): 162.56 (04-23-19 @ 17:40)  Weight (kg): 72.3 (04-23-19 @ 17:40)  BMI (kg/m2): 27.4 (04-23-19 @ 17:40)  BSA (m2): 1.78 (04-23-19 @ 17:40)      04-23-19 @ 07:01  -  04-23-19 @ 22:07  --------------------------------------------------------  IN: 240 mL / OUT: 0 mL / NET: 240 mL      Physical Exam:  	Gen: NAD, intermittently interactive  	HEENT: Anicteric  	Pulm: CTA B/L  	CV:  S1S2  	Abd: +BS, soft   	Ext: No B/L Lower ext edema  	Neuro: No focal deficits  	Skin: Warm  	Vascular: No cyanosis    LABS/STUDIES  --------------------------------------------------------------------------------              12.3   10.7  >-----------<  308      [04-23-19 @ 13:59]              37.6     121  |  84  |  13  ----------------------------<  153      [04-23-19 @ 13:59]  4.8   |  26  |  1.30        Ca     9.3     [04-23-19 @ 13:59]    TPro  6.0  /  Alb  3.6  /  TBili  0.4  /  DBili  x   /  AST  16  /  ALT  11  /  AlkPhos  77  [04-23-19 @ 13:59]        Uric acid 3.4      [04-23-19 @ 13:59]  Serum Osmolality 264      [04-23-19 @ 14:47]    Creatinine Trend:  SCr 1.30 [04-23 @ 13:59]    Urinalysis - [04-23-19 @ 15:28]      Color Yellow / Appearance Turbid / SG 1.008 / pH 7.0      Gluc Negative / Ketone Negative  / Bili Negative / Urobili Negative       Blood Small / Protein 30 mg/dL / Leuk Est Large / Nitrite Negative      RBC 3-5 / WBC >50 / Hyaline 0 / Gran  / Sq Epi  / Non Sq Epi occ / Bacteria Few    Urine Creatinine 35      [04-23-19 @ 15:27]  Urine Sodium 52      [04-23-19 @ 15:27]  Urine Potassium 35      [04-23-19 @ 15:27]  Urine Osmolality 243      [04-23-19 @ 15:27]    TSH 8.49      [04-23-19 @ 17:28] [facility] Division of Kidney Diseases & Hypertension  INITIAL CONSULT NOTE  [phone]--------------------------------------------------------------------------------  HPI: 89 year old female with history of CKD Stage III, HTN, HLD, DM, Afib, Hypothyroidism, Dementia at baseline and declining mental status who presents to the hospital after lab results showed abnormal sodium. Patient lives in [place] with home health aides and gets labs done and last week her sodium was noted to be 126. Patient had repeat labs done Monday which showed sodium of 121 and was directed to the ED for further management. Patient at baseline answers questions intermittently however has had declining mental function. Family who is at bedside states she has not worsening confusion, lethargy or acutely worsening mental status in the last week. Patient is not on diuretics, and not been taking excessive NSAIDs. Patient is on Lexapro. No heart failure or liver failure. Patient does have noted elevated creatinine and family states she has had "bad kidneys" for some time. Patient has not had decreased appetite. Patient's family states she has been drinking more fluid a day, ~1-2L a day. Otherwise patient has been at baseline health. Nephrology consulted for hyponatremia.      PAST HISTORY  --------------------------------------------------------------------------------  PAST MEDICAL & SURGICAL HISTORY:  DM (diabetes mellitus)  Hypothyroidism  HLD (hyperlipidemia)  HTN (hypertension)  Atrial fibrillation  Hypothyroid  Diabetes  Hypercholesteremia  Asthma  Hypertension  No significant past surgical history  No significant past surgical history    FAMILY HISTORY:  No pertinent family history in first degree relatives    PAST SOCIAL HISTORY: Lives with A in [place].    ALLERGIES & MEDICATIONS  --------------------------------------------------------------------------------  Allergies    No Known Allergies    Intolerances      Standing Inpatient Medications  amLODIPine   Tablet 10 milliGRAM(s) Oral daily  aspirin  chewable 81 milliGRAM(s) Oral daily  atorvastatin 10 milliGRAM(s) Oral at bedtime  carvedilol 6.25 milliGRAM(s) Oral every 12 hours  dextrose 5%. 1000 milliLiter(s) IV Continuous <Continuous>  dextrose 50% Injectable 12.5 Gram(s) IV Push once  dextrose 50% Injectable 25 Gram(s) IV Push once  dextrose 50% Injectable 25 Gram(s) IV Push once  escitalopram 10 milliGRAM(s) Oral daily  famotidine    Tablet 20 milliGRAM(s) Oral daily  heparin  Injectable 5000 Unit(s) SubCutaneous every 8 hours  insulin glargine Injectable (LANTUS) 6 Unit(s) SubCutaneous at bedtime  insulin lispro (HumaLOG) corrective regimen sliding scale   SubCutaneous three times a day before meals  insulin lispro (HumaLOG) corrective regimen sliding scale   SubCutaneous at bedtime  levothyroxine 125 MICROGram(s) Oral daily  losartan 50 milliGRAM(s) Oral daily  montelukast 10 milliGRAM(s) Oral daily    PRN Inpatient Medications  ALBUTerol/ipratropium for Nebulization 3 milliLiter(s) Nebulizer every 6 hours PRN  dextrose 40% Gel 15 Gram(s) Oral once PRN  glucagon  Injectable 1 milliGRAM(s) IntraMuscular once PRN      REVIEW OF SYSTEMS: Unable to obtain 2/2 clinical condition.    VITALS/PHYSICAL EXAM  --------------------------------------------------------------------------------  T(C): 36.6 (04-23-19 @ 20:59), Max: 36.7 (04-23-19 @ 13:43)  HR: 62 (04-23-19 @ 20:59) (60 - 77)  BP: 159/75 (04-23-19 @ 20:59) (100/72 - 165/71)  RR: 17 (04-23-19 @ 20:59) (16 - 18)  SpO2: 99% (04-23-19 @ 20:59) (97% - 99%)  Wt(kg): --  Height (cm): 162.56 (04-23-19 @ 17:40)  Weight (kg): 72.3 (04-23-19 @ 17:40)  BMI (kg/m2): 27.4 (04-23-19 @ 17:40)  BSA (m2): 1.78 (04-23-19 @ 17:40)      04-23-19 @ 07:01  -  04-23-19 @ 22:07  --------------------------------------------------------  IN: 240 mL / OUT: 0 mL / NET: 240 mL      Physical Exam:  	Gen: NAD, intermittently interactive  	HEENT: Anicteric  	Pulm: CTA B/L  	CV:  S1S2  	Abd: +BS, soft   	Ext: No B/L Lower ext edema  	Neuro: No focal deficits  	Skin: Warm  	Vascular: No cyanosis    LABS/STUDIES  --------------------------------------------------------------------------------              12.3   10.7  >-----------<  308      [04-23-19 @ 13:59]              37.6     121  |  84  |  13  ----------------------------<  153      [04-23-19 @ 13:59]  4.8   |  26  |  1.30        Ca     9.3     [04-23-19 @ 13:59]    TPro  6.0  /  Alb  3.6  /  TBili  0.4  /  DBili  x   /  AST  16  /  ALT  11  /  AlkPhos  77  [04-23-19 @ 13:59]        Uric acid 3.4      [04-23-19 @ 13:59]  Serum Osmolality 264      [04-23-19 @ 14:47]    Creatinine Trend:  SCr 1.30 [04-23 @ 13:59]    Urinalysis - [04-23-19 @ 15:28]      Color Yellow / Appearance Turbid / SG 1.008 / pH 7.0      Gluc Negative / Ketone Negative  / Bili Negative / Urobili Negative       Blood Small / Protein 30 mg/dL / Leuk Est Large / Nitrite Negative      RBC 3-5 / WBC >50 / Hyaline 0 / Gran  / Sq Epi  / Non Sq Epi occ / Bacteria Few    Urine Creatinine 35      [04-23-19 @ 15:27]  Urine Sodium 52      [04-23-19 @ 15:27]  Urine Potassium 35      [04-23-19 @ 15:27]  Urine Osmolality 243      [04-23-19 @ 15:27]    TSH 8.49      [04-23-19 @ 17:28]

## 2019-04-23 NOTE — H&P ADULT - ATTENDING COMMENTS
I have discussed the plan and findings with the resident and I agree with above plan.  patient is currently presenting with asymptomatic Hyponatremia so there is no urgent need for Aggressive sodium correction. 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  For now ill fluid restrict.  Renal eval placed and their recs are appreciated.  Serial BMPs. If acute changes in mental status then pt will require hypertonic saline and MICU evaluation.    I will see patient in the AM and provider any further changes in the plan at that time.

## 2019-04-23 NOTE — H&P ADULT - ASSESSMENT
89F hx of dementia (AAOx1), recurrent hyponatremia, hypothyroidism, afib on Eliquis, HTN, HLD, T2DM (A1C 9.6% 10/2018), asthma who presents with abnormal outpatient labs (hyponatremia 123). 89F hx of dementia (AAOx1, primarily bedbound, lives at home with 24 hour HHA ), recurrent hyponatremia, hypothyroidism, afib (no longer on Eliquis after dark stools 4 months ago), HTN, HLD, T2DM (A1C 9.6% 10/2018), asthma who presents with abnormal outpatient labs (hyponatremia 123). 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

## 2019-04-23 NOTE — H&P ADULT - NSICDXPASTMEDICALHX_GEN_ALL_CORE_FT
PAST MEDICAL HISTORY:  Asthma     Atrial fibrillation     Diabetes     DM (diabetes mellitus)     HLD (hyperlipidemia)     HTN (hypertension)     Hypercholesteremia     Hypertension     Hypothyroid     Hypothyroidism

## 2019-04-23 NOTE — ED PROVIDER NOTE - NEUROLOGICAL, MLM
Alert and oriented, no focal deficits, no motor or sensory deficits. following commands intermittently.

## 2019-04-23 NOTE — CONSULT NOTE ADULT - PROBLEM SELECTOR RECOMMENDATION 9
Patient has chronic asymptomatic? hypoosmolar euvolemic hyponatremia. This is likely secondary to poor PO intake, increased free water intake. It is possible the patient may have SIADH as well however urine osmolality being low goes against it. At this time there is no role for hyper tonic saline however would free water restrict the patient for now and encourage salt/protein intake. Check sodium q4-q6h. Correct no greater than 6 MeQ/day. Nephrology to continue following.

## 2019-04-23 NOTE — ED ADULT NURSE NOTE - CHPI ED NUR SYMPTOMS NEG
no decreased eating/drinking/no chills/no nausea/no pain/no weakness/no tingling/no fever/no dizziness/no vomiting

## 2019-04-23 NOTE — H&P ADULT - PROBLEM SELECTOR PLAN 4
- [id number], Right ABELARDO cerebral infarct 07/2018  - was previously on Eliquis, discontinued 4 months ago due to dark stools   - c/w rate control coreg 6.25mg BID

## 2019-04-23 NOTE — H&P ADULT - NSHPPHYSICALEXAM_GEN_ALL_CORE
Vital Signs Last 24 Hrs  T(C): 36.7 (23 Apr 2019 13:43), Max: 36.7 (23 Apr 2019 13:43)  T(F): 98.1 (23 Apr 2019 13:43), Max: 98.1 (23 Apr 2019 13:43)  HR: 77 (23 Apr 2019 13:43) (62 - 77)  BP: 165/71 (23 Apr 2019 13:43) (149/65 - 165/71)  RR: 17 (23 Apr 2019 13:43) (17 - 18)  SpO2: 97% (23 Apr 2019 13:43) (97% - 98%)    General: WN/WD NAD  Neurology: A&Ox3, nonfocal, SILVER x 4  Eyes: PERRLA/ EOMI, Gross vision intact  ENT/Neck: Neck supple, trachea midline, No JVD, Gross hearing intact  Respiratory: CTA B/L, No wheezing, rales, rhonchi  CV: RRR, S1S2, no murmurs, rubs or gallops  Abdominal: Soft, NT, ND +BS,   Extremities: No edema, + peripheral pulses  Skin: No Rashes, Hematoma, Ecchymosis

## 2019-04-23 NOTE — ED PROVIDER NOTE - MUSCULOSKELETAL, MLM
Spine appears normal, range of motion is not limited, no muscle or joint tenderness. moving all extremities without obvious limitation.

## 2019-04-24 ENCOUNTER — APPOINTMENT (OUTPATIENT)
Dept: HOME HEALTH SERVICES | Facility: HOME HEALTH | Age: 84
End: 2019-04-24

## 2019-04-24 DIAGNOSIS — E87.1 HYPO-OSMOLALITY AND HYPONATREMIA: ICD-10-CM

## 2019-04-24 LAB
ANION GAP SERPL CALC-SCNC: 9 MMOL/L — SIGNIFICANT CHANGE UP (ref 5–17)
BUN SERPL-MCNC: 12 MG/DL — SIGNIFICANT CHANGE UP (ref 7–23)
BUN SERPL-MCNC: 12 MG/DL — SIGNIFICANT CHANGE UP (ref 7–23)
BUN SERPL-MCNC: 13 MG/DL — SIGNIFICANT CHANGE UP (ref 7–23)
CALCIUM SERPL-MCNC: 9.1 MG/DL — SIGNIFICANT CHANGE UP (ref 8.4–10.5)
CALCIUM SERPL-MCNC: 9.3 MG/DL — SIGNIFICANT CHANGE UP (ref 8.4–10.5)
CALCIUM SERPL-MCNC: 9.6 MG/DL — SIGNIFICANT CHANGE UP (ref 8.4–10.5)
CHLORIDE SERPL-SCNC: 86 MMOL/L — LOW (ref 96–108)
CHLORIDE SERPL-SCNC: 87 MMOL/L — LOW (ref 96–108)
CHLORIDE SERPL-SCNC: 88 MMOL/L — LOW (ref 96–108)
CO2 SERPL-SCNC: 26 MMOL/L — SIGNIFICANT CHANGE UP (ref 22–31)
CO2 SERPL-SCNC: 27 MMOL/L — SIGNIFICANT CHANGE UP (ref 22–31)
CO2 SERPL-SCNC: 27 MMOL/L — SIGNIFICANT CHANGE UP (ref 22–31)
CORTIS AM PEAK SERPL-MCNC: 9.4 UG/DL — SIGNIFICANT CHANGE UP (ref 6–18.4)
CREAT SERPL-MCNC: 1.17 MG/DL — SIGNIFICANT CHANGE UP (ref 0.5–1.3)
CREAT SERPL-MCNC: 1.23 MG/DL — SIGNIFICANT CHANGE UP (ref 0.5–1.3)
CREAT SERPL-MCNC: 1.26 MG/DL — SIGNIFICANT CHANGE UP (ref 0.5–1.3)
GLUCOSE SERPL-MCNC: 126 MG/DL — HIGH (ref 70–99)
GLUCOSE SERPL-MCNC: 54 MG/DL — LOW (ref 70–99)
GLUCOSE SERPL-MCNC: 91 MG/DL — SIGNIFICANT CHANGE UP (ref 70–99)
HBA1C BLD-MCNC: 6.1 % — HIGH (ref 4–5.6)
MAGNESIUM SERPL-MCNC: 2 MG/DL — SIGNIFICANT CHANGE UP (ref 1.6–2.6)
OSMOLALITY SERPL: 259 MOSM/KG
PHOSPHATE SERPL-MCNC: 3.5 MG/DL — SIGNIFICANT CHANGE UP (ref 2.5–4.5)
POTASSIUM SERPL-MCNC: 4.3 MMOL/L — SIGNIFICANT CHANGE UP (ref 3.5–5.3)
POTASSIUM SERPL-MCNC: 4.5 MMOL/L — SIGNIFICANT CHANGE UP (ref 3.5–5.3)
POTASSIUM SERPL-MCNC: 4.5 MMOL/L — SIGNIFICANT CHANGE UP (ref 3.5–5.3)
POTASSIUM SERPL-SCNC: 4.3 MMOL/L — SIGNIFICANT CHANGE UP (ref 3.5–5.3)
POTASSIUM SERPL-SCNC: 4.5 MMOL/L — SIGNIFICANT CHANGE UP (ref 3.5–5.3)
POTASSIUM SERPL-SCNC: 4.5 MMOL/L — SIGNIFICANT CHANGE UP (ref 3.5–5.3)
SODIUM SERPL-SCNC: 122 MMOL/L — LOW (ref 135–145)
SODIUM SERPL-SCNC: 122 MMOL/L — LOW (ref 135–145)
SODIUM SERPL-SCNC: 124 MMOL/L — LOW (ref 135–145)
T3 SERPL-MCNC: 41 NG/DL — LOW (ref 80–200)
T4 AB SER-ACNC: 7.8 UG/DL — SIGNIFICANT CHANGE UP (ref 4.6–12)

## 2019-04-24 PROCEDURE — 99223 1ST HOSP IP/OBS HIGH 75: CPT | Mod: GC

## 2019-04-24 RX ORDER — INSULIN GLARGINE 100 [IU]/ML
5 INJECTION, SOLUTION SUBCUTANEOUS AT BEDTIME
Qty: 0 | Refills: 0 | Status: DISCONTINUED | OUTPATIENT
Start: 2019-04-24 | End: 2019-04-28

## 2019-04-24 RX ORDER — DEXTROSE 50 % IN WATER 50 %
15 SYRINGE (ML) INTRAVENOUS ONCE
Qty: 0 | Refills: 0 | Status: COMPLETED | OUTPATIENT
Start: 2019-04-24 | End: 2019-04-24

## 2019-04-24 RX ORDER — ESCITALOPRAM OXALATE 10 MG/1
5 TABLET, FILM COATED ORAL DAILY
Qty: 0 | Refills: 0 | Status: DISCONTINUED | OUTPATIENT
Start: 2019-04-24 | End: 2019-04-28

## 2019-04-24 RX ADMIN — AMLODIPINE BESYLATE 10 MILLIGRAM(S): 2.5 TABLET ORAL at 22:56

## 2019-04-24 RX ADMIN — INSULIN GLARGINE 5 UNIT(S): 100 INJECTION, SOLUTION SUBCUTANEOUS at 22:55

## 2019-04-24 RX ADMIN — ESCITALOPRAM OXALATE 10 MILLIGRAM(S): 10 TABLET, FILM COATED ORAL at 12:04

## 2019-04-24 RX ADMIN — HEPARIN SODIUM 5000 UNIT(S): 5000 INJECTION INTRAVENOUS; SUBCUTANEOUS at 22:56

## 2019-04-24 RX ADMIN — Medication 81 MILLIGRAM(S): at 12:04

## 2019-04-24 RX ADMIN — FAMOTIDINE 20 MILLIGRAM(S): 10 INJECTION INTRAVENOUS at 12:04

## 2019-04-24 RX ADMIN — CARVEDILOL PHOSPHATE 6.25 MILLIGRAM(S): 80 CAPSULE, EXTENDED RELEASE ORAL at 06:36

## 2019-04-24 RX ADMIN — LOSARTAN POTASSIUM 50 MILLIGRAM(S): 100 TABLET, FILM COATED ORAL at 06:36

## 2019-04-24 RX ADMIN — Medication 125 MICROGRAM(S): at 06:36

## 2019-04-24 RX ADMIN — CARVEDILOL PHOSPHATE 6.25 MILLIGRAM(S): 80 CAPSULE, EXTENDED RELEASE ORAL at 18:10

## 2019-04-24 RX ADMIN — MONTELUKAST 10 MILLIGRAM(S): 4 TABLET, CHEWABLE ORAL at 22:56

## 2019-04-24 RX ADMIN — HEPARIN SODIUM 5000 UNIT(S): 5000 INJECTION INTRAVENOUS; SUBCUTANEOUS at 13:23

## 2019-04-24 RX ADMIN — Medication 15 GRAM(S): at 09:27

## 2019-04-24 RX ADMIN — HEPARIN SODIUM 5000 UNIT(S): 5000 INJECTION INTRAVENOUS; SUBCUTANEOUS at 06:36

## 2019-04-24 RX ADMIN — ATORVASTATIN CALCIUM 10 MILLIGRAM(S): 80 TABLET, FILM COATED ORAL at 22:56

## 2019-04-24 NOTE — PROGRESS NOTE ADULT - ASSESSMENT
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

## 2019-04-24 NOTE — PROGRESS NOTE ADULT - PROBLEM SELECTOR PLAN 10
- DVT ppx: HSQ TID  - DIET: Carbohydrate Restricted / Pureed + Fluid restriction 1600cc - DVT ppx: HSQ TID  - DIET: Carbohydrate Restricted / Pureed + Fluid restriction 1000cc

## 2019-04-24 NOTE — PROGRESS NOTE ADULT - PROBLEM SELECTOR PLAN 1
- Na 121, euvolemic on exam  - outpatient Na 126 --> 123 after increased fluid intake, TSH on [employer] 10.7  - serum osm 264 > urine osm 243, urine sodium 52, Urine Specific gravity wnl.   - likely euvolemic hyponatremia 2/2 SIADH  - d/w renal fellow, will fluid restrict, monitor BMP [id number] or sooner if any neurologic abnormalities (at baseline AAOx1, follows commands, minimally verbal)  - check AM cortisol and TFTs  - f/u renal recommendations - Na 121 on admission, euvolemic on exam  - outpatient Na 126 --> 123 after increased fluid intake, TSH on [employer] 10.7  - serum osm 264 > urine osm 243, urine sodium 52, Urine Specific gravity wnl.   - likely euvolemic hyponatremia 2/2 SIADH vs increased fluid intake w/o solute intake  - d/w renal fellow, will fluid restrict, monitor BMP [id number] or sooner if any neurologic abnormalities (at baseline AAOx1, follows commands, minimally verbal)  - check AM cortisol and TFTs  - f/u renal recommendations

## 2019-04-24 NOTE — PROGRESS NOTE ADULT - SUBJECTIVE AND OBJECTIVE BOX
Patient is a 89y old  Female who presents with a chief complaint of Hyponatremia (2019 22:05)      SUBJECTIVE / OVERNIGHT EVENTS:    MEDICATIONS  (STANDING):  amLODIPine   Tablet 10 milliGRAM(s) Oral daily  aspirin  chewable 81 milliGRAM(s) Oral daily  atorvastatin 10 milliGRAM(s) Oral at bedtime  carvedilol 6.25 milliGRAM(s) Oral every 12 hours  dextrose 5%. 1000 milliLiter(s) (50 mL/Hr) IV Continuous <Continuous>  dextrose 50% Injectable 12.5 Gram(s) IV Push once  dextrose 50% Injectable 25 Gram(s) IV Push once  dextrose 50% Injectable 25 Gram(s) IV Push once  escitalopram 10 milliGRAM(s) Oral daily  famotidine    Tablet 20 milliGRAM(s) Oral daily  heparin  Injectable 5000 Unit(s) SubCutaneous every 8 hours  insulin glargine Injectable (LANTUS) 6 Unit(s) SubCutaneous at bedtime  insulin lispro (HumaLOG) corrective regimen sliding scale   SubCutaneous three times a day before meals  insulin lispro (HumaLOG) corrective regimen sliding scale   SubCutaneous at bedtime  levothyroxine 125 MICROGram(s) Oral daily  losartan 50 milliGRAM(s) Oral daily  montelukast 10 milliGRAM(s) Oral daily    MEDICATIONS  (PRN):  ALBUTerol/ipratropium for Nebulization 3 milliLiter(s) Nebulizer every 6 hours PRN Shortness of Breath and/or Wheezing  dextrose 40% Gel 15 Gram(s) Oral once PRN Blood Glucose LESS THAN 70 milliGRAM(s)/deciliter  glucagon  Injectable 1 milliGRAM(s) IntraMuscular once PRN Glucose LESS THAN 70 milligrams/deciliter      Vital Signs Last 24 Hrs  T(C): 36.6 (2019 06:31), Max: 36.9 (2019 02:22)  T(F): 97.9 (2019 06:31), Max: 98.4 (2019 02:22)  HR: 62 (2019 06:31) (60 - 77)  BP: 160/72 (2019 06:31) (100/72 - 165/71)  BP(mean): --  RR: 18 (2019 06:31) (16 - 18)  SpO2: 98% (2019 06:31) (97% - 99%)  CAPILLARY BLOOD GLUCOSE      POCT Blood Glucose.: 111 mg/dL (2019 21:53)  POCT Blood Glucose.: 95 mg/dL (2019 19:08)  POCT Blood Glucose.: 94 mg/dL (2019 18:51)  POCT Blood Glucose.: 80 mg/dL (2019 18:32)  POCT Blood Glucose.: 68 mg/dL (2019 18:15)  POCT Blood Glucose.: 66 mg/dL (2019 18:13)    I&O's Summary    2019 07:01  -  2019 07:00  --------------------------------------------------------  IN: 240 mL / OUT: 0 mL / NET: 240 mL                    LABS:                        12.3   10.7  )-----------( 308      ( 2019 13:59 )             37.6     04-23    121<L>  |  85<L>  |  13  ----------------------------<  102<H>  4.6   |  26  |  1.21    Ca    9.1      2019 22:59    TPro  6.0  /  Alb  3.6  /  TBili  0.4  /  DBili  x   /  AST  16  /  ALT  11  /  AlkPhos  77  04-23          Urinalysis Basic - ( 2019 15:28 )    Color: Yellow / Appearance: Turbid / S.008 / pH: x  Gluc: x / Ketone: Negative  / Bili: Negative / Urobili: Negative   Blood: x / Protein: 30 mg/dL / Nitrite: Negative   Leuk Esterase: Large / RBC: 3-5 /hpf / WBC >50 /HPF   Sq Epi: x / Non Sq Epi: occ / Bacteria: Few Patient is a 89y old  Female who presents with a chief complaint of Hyponatremia (2019 22:05)      SUBJECTIVE / OVERNIGHT EVENTS: NO events reported. Earlier this morning pt is very sleepy, arousable. Later on is more alert and at baseline mental status.     MEDICATIONS  (STANDING):  amLODIPine   Tablet 10 milliGRAM(s) Oral daily  aspirin  chewable 81 milliGRAM(s) Oral daily  atorvastatin 10 milliGRAM(s) Oral at bedtime  carvedilol 6.25 milliGRAM(s) Oral every 12 hours  dextrose 5%. 1000 milliLiter(s) (50 mL/Hr) IV Continuous <Continuous>  dextrose 50% Injectable 12.5 Gram(s) IV Push once  dextrose 50% Injectable 25 Gram(s) IV Push once  dextrose 50% Injectable 25 Gram(s) IV Push once  escitalopram 10 milliGRAM(s) Oral daily  famotidine    Tablet 20 milliGRAM(s) Oral daily  heparin  Injectable 5000 Unit(s) SubCutaneous every 8 hours  insulin glargine Injectable (LANTUS) 6 Unit(s) SubCutaneous at bedtime  insulin lispro (HumaLOG) corrective regimen sliding scale   SubCutaneous three times a day before meals  insulin lispro (HumaLOG) corrective regimen sliding scale   SubCutaneous at bedtime  levothyroxine 125 MICROGram(s) Oral daily  losartan 50 milliGRAM(s) Oral daily  montelukast 10 milliGRAM(s) Oral daily    MEDICATIONS  (PRN):  ALBUTerol/ipratropium for Nebulization 3 milliLiter(s) Nebulizer every 6 hours PRN Shortness of Breath and/or Wheezing  dextrose 40% Gel 15 Gram(s) Oral once PRN Blood Glucose LESS THAN 70 milliGRAM(s)/deciliter  glucagon  Injectable 1 milliGRAM(s) IntraMuscular once PRN Glucose LESS THAN 70 milligrams/deciliter      Vital Signs Last 24 Hrs  T(C): 36.6 (2019 06:31), Max: 36.9 (2019 02:22)  T(F): 97.9 (2019 06:31), Max: 98.4 (2019 02:22)  HR: 62 (2019 06:31) (60 - 77)  BP: 160/72 (2019 06:31) (100/72 - 165/71)  BP(mean): --  RR: 18 (2019 06:31) (16 - 18)  SpO2: 98% (2019 06:31) (97% - 99%)  CAPILLARY BLOOD GLUCOSE      POCT Blood Glucose.: 111 mg/dL (2019 21:53)  POCT Blood Glucose.: 95 mg/dL (2019 19:08)  POCT Blood Glucose.: 94 mg/dL (2019 18:51)  POCT Blood Glucose.: 80 mg/dL (2019 18:32)  POCT Blood Glucose.: 68 mg/dL (2019 18:15)  POCT Blood Glucose.: 66 mg/dL (2019 18:13)    I&O's Summary    2019 07:01  -  2019 07:00  --------------------------------------------------------  IN: 240 mL / OUT: 0 mL / NET: 240 mL        	General: WN/WD NAD  	Neurology: A&Ox1, nonfocal  	Eyes: PERRLA/ EOMI, Gross vision intact  	ENT/Neck: Neck supple, trachea midline, No JVD, Gross hearing intact  	Respiratory: CTA B/L, No wheezing, rales, rhonchi  	CV: RRR, S1S2, no murmurs, rubs or gallops  	Abdominal: Soft, NT, ND +BS,   	Extremities: No edema, + peripheral pulses  Skin: No Rashes, Hematoma, Ecchymosis        LABS:                        12.3   10.7  )-----------( 308      ( 2019 13:59 )             37.6     04-23    121<L>  |  85<L>  |  13  ----------------------------<  102<H>  4.6   |  26  |  1.21    Ca    9.1      2019 22:59    TPro  6.0  /  Alb  3.6  /  TBili  0.4  /  DBili  x   /  AST  16  /  ALT  11  /  AlkPhos  77  04-23          Urinalysis Basic - ( 2019 15:28 )    Color: Yellow / Appearance: Turbid / S.008 / pH: x  Gluc: x / Ketone: Negative  / Bili: Negative / Urobili: Negative   Blood: x / Protein: 30 mg/dL / Nitrite: Negative   Leuk Esterase: Large / RBC: 3-5 /hpf / WBC >50 /HPF   Sq Epi: x / Non Sq Epi: occ / Bacteria: Few

## 2019-04-24 NOTE — PROGRESS NOTE ADULT - PROBLEM SELECTOR PLAN 4
- [id number], Right ABELARDO cerebral infarct 07/2018  - was previously on Eliquis, discontinued 4 months ago due to dark stools   - c/w rate control coreg 6.25mg BID

## 2019-04-25 LAB
ANION GAP SERPL CALC-SCNC: 10 MMOL/L — SIGNIFICANT CHANGE UP (ref 5–17)
ANION GAP SERPL CALC-SCNC: 12 MMOL/L — SIGNIFICANT CHANGE UP (ref 5–17)
BUN SERPL-MCNC: 12 MG/DL — SIGNIFICANT CHANGE UP (ref 7–23)
BUN SERPL-MCNC: 13 MG/DL — SIGNIFICANT CHANGE UP (ref 7–23)
CALCIUM SERPL-MCNC: 9 MG/DL — SIGNIFICANT CHANGE UP (ref 8.4–10.5)
CALCIUM SERPL-MCNC: 9.7 MG/DL — SIGNIFICANT CHANGE UP (ref 8.4–10.5)
CHLORIDE SERPL-SCNC: 87 MMOL/L — LOW (ref 96–108)
CHLORIDE SERPL-SCNC: 89 MMOL/L — LOW (ref 96–108)
CO2 SERPL-SCNC: 26 MMOL/L — SIGNIFICANT CHANGE UP (ref 22–31)
CO2 SERPL-SCNC: 26 MMOL/L — SIGNIFICANT CHANGE UP (ref 22–31)
CREAT SERPL-MCNC: 1.21 MG/DL — SIGNIFICANT CHANGE UP (ref 0.5–1.3)
CREAT SERPL-MCNC: 1.27 MG/DL — SIGNIFICANT CHANGE UP (ref 0.5–1.3)
GLUCOSE SERPL-MCNC: 105 MG/DL — HIGH (ref 70–99)
GLUCOSE SERPL-MCNC: 70 MG/DL — SIGNIFICANT CHANGE UP (ref 70–99)
HCT VFR BLD CALC: 36.4 % — SIGNIFICANT CHANGE UP (ref 34.5–45)
HGB BLD-MCNC: 11.8 G/DL — SIGNIFICANT CHANGE UP (ref 11.5–15.5)
MAGNESIUM SERPL-MCNC: 2 MG/DL — SIGNIFICANT CHANGE UP (ref 1.6–2.6)
MCHC RBC-ENTMCNC: 26.8 PG — LOW (ref 27–34)
MCHC RBC-ENTMCNC: 32.4 GM/DL — SIGNIFICANT CHANGE UP (ref 32–36)
MCV RBC AUTO: 82.7 FL — SIGNIFICANT CHANGE UP (ref 80–100)
PHOSPHATE SERPL-MCNC: 3.6 MG/DL — SIGNIFICANT CHANGE UP (ref 2.5–4.5)
PLATELET # BLD AUTO: 308 K/UL — SIGNIFICANT CHANGE UP (ref 150–400)
POTASSIUM SERPL-MCNC: 4.4 MMOL/L — SIGNIFICANT CHANGE UP (ref 3.5–5.3)
POTASSIUM SERPL-MCNC: 4.5 MMOL/L — SIGNIFICANT CHANGE UP (ref 3.5–5.3)
POTASSIUM SERPL-SCNC: 4.4 MMOL/L — SIGNIFICANT CHANGE UP (ref 3.5–5.3)
POTASSIUM SERPL-SCNC: 4.5 MMOL/L — SIGNIFICANT CHANGE UP (ref 3.5–5.3)
RBC # BLD: 4.4 M/UL — SIGNIFICANT CHANGE UP (ref 3.8–5.2)
RBC # FLD: 14 % — SIGNIFICANT CHANGE UP (ref 10.3–14.5)
SODIUM SERPL-SCNC: 125 MMOL/L — LOW (ref 135–145)
SODIUM SERPL-SCNC: 125 MMOL/L — LOW (ref 135–145)
WBC # BLD: 9.5 K/UL — SIGNIFICANT CHANGE UP (ref 3.8–10.5)
WBC # FLD AUTO: 9.5 K/UL — SIGNIFICANT CHANGE UP (ref 3.8–10.5)

## 2019-04-25 PROCEDURE — 99232 SBSQ HOSP IP/OBS MODERATE 35: CPT | Mod: GC

## 2019-04-25 PROCEDURE — 99233 SBSQ HOSP IP/OBS HIGH 50: CPT | Mod: GC

## 2019-04-25 RX ORDER — SODIUM CHLORIDE 9 MG/ML
1 INJECTION INTRAMUSCULAR; INTRAVENOUS; SUBCUTANEOUS
Qty: 0 | Refills: 0 | Status: DISCONTINUED | OUTPATIENT
Start: 2019-04-25 | End: 2019-04-26

## 2019-04-25 RX ADMIN — HEPARIN SODIUM 5000 UNIT(S): 5000 INJECTION INTRAVENOUS; SUBCUTANEOUS at 22:23

## 2019-04-25 RX ADMIN — Medication 125 MICROGRAM(S): at 06:06

## 2019-04-25 RX ADMIN — SODIUM CHLORIDE 1 GRAM(S): 9 INJECTION INTRAMUSCULAR; INTRAVENOUS; SUBCUTANEOUS at 17:16

## 2019-04-25 RX ADMIN — Medication 81 MILLIGRAM(S): at 11:55

## 2019-04-25 RX ADMIN — INSULIN GLARGINE 5 UNIT(S): 100 INJECTION, SOLUTION SUBCUTANEOUS at 22:22

## 2019-04-25 RX ADMIN — LOSARTAN POTASSIUM 50 MILLIGRAM(S): 100 TABLET, FILM COATED ORAL at 06:06

## 2019-04-25 RX ADMIN — AMLODIPINE BESYLATE 10 MILLIGRAM(S): 2.5 TABLET ORAL at 22:23

## 2019-04-25 RX ADMIN — ATORVASTATIN CALCIUM 10 MILLIGRAM(S): 80 TABLET, FILM COATED ORAL at 22:23

## 2019-04-25 RX ADMIN — ESCITALOPRAM OXALATE 5 MILLIGRAM(S): 10 TABLET, FILM COATED ORAL at 11:55

## 2019-04-25 RX ADMIN — CARVEDILOL PHOSPHATE 6.25 MILLIGRAM(S): 80 CAPSULE, EXTENDED RELEASE ORAL at 17:17

## 2019-04-25 RX ADMIN — HEPARIN SODIUM 5000 UNIT(S): 5000 INJECTION INTRAVENOUS; SUBCUTANEOUS at 06:06

## 2019-04-25 RX ADMIN — MONTELUKAST 10 MILLIGRAM(S): 4 TABLET, CHEWABLE ORAL at 22:23

## 2019-04-25 RX ADMIN — FAMOTIDINE 20 MILLIGRAM(S): 10 INJECTION INTRAVENOUS at 11:55

## 2019-04-25 RX ADMIN — CARVEDILOL PHOSPHATE 6.25 MILLIGRAM(S): 80 CAPSULE, EXTENDED RELEASE ORAL at 06:06

## 2019-04-25 RX ADMIN — HEPARIN SODIUM 5000 UNIT(S): 5000 INJECTION INTRAVENOUS; SUBCUTANEOUS at 15:13

## 2019-04-25 NOTE — PROGRESS NOTE ADULT - PROBLEM SELECTOR PLAN 1
- Na 121 on admission, euvolemic on exam  - outpatient Na 126 --> 123 after increased fluid intake, TSH on [employer] 10.7  - serum osm 264 > urine osm 243, urine sodium 52, Urine Specific gravity wnl.   - likely euvolemic hyponatremia 2/2 SIADH vs increased fluid intake w/o solute intake  - d/w renal fellow, will fluid restrict, monitor BMP [id number] or sooner if any neurologic abnormalities (at baseline AAOx1, follows commands, minimally verbal)  - check AM cortisol and TFTs  - f/u renal recommendations - Na 121 on admission, euvolemic on exam  - outpatient Na 126 --> 123 after increased fluid intake, TSH on [employer] 10.7  - serum osm 264 > urine osm 243, urine sodium 52, Urine Specific gravity wnl.   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   - likely euvolemic hyponatremia 2/2 SIADH vs increased fluid intake w/o solute intake  - d/w renal fellow, will fluid restrict, monitor BMP [id number] or sooner if any neurologic abnormalities (at baseline AAOx1, follows commands, minimally verbal)  - Renal following.  - will titrate off SSRI  - will give a dose of NaCl given that Na is still below our goal of 130.

## 2019-04-25 NOTE — PROGRESS NOTE ADULT - ASSESSMENT
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

## 2019-04-25 NOTE — PROGRESS NOTE ADULT - SUBJECTIVE AND OBJECTIVE BOX
[facility] DIVISION OF KIDNEY DISEASES AND HYPERTENSION -- FOLLOW UP NOTE  --------------------------------------------------------------------------------  Chief Complaint: Hyponatremia    24 hour events/subjective:  Pt awake and responsive. Denies headache, blurry vision, dizziness today.     PAST HISTORY  --------------------------------------------------------------------------------  No significant changes to PMH, PSH, FHx, SHx, unless otherwise noted    ALLERGIES & MEDICATIONS  --------------------------------------------------------------------------------  Allergies    No Known Allergies    Intolerances      Standing Inpatient Medications  amLODIPine   Tablet 10 milliGRAM(s) Oral daily  aspirin  chewable 81 milliGRAM(s) Oral daily  atorvastatin 10 milliGRAM(s) Oral at bedtime  carvedilol 6.25 milliGRAM(s) Oral every 12 hours  dextrose 5%. 1000 milliLiter(s) IV Continuous <Continuous>  dextrose 50% Injectable 12.5 Gram(s) IV Push once  dextrose 50% Injectable 25 Gram(s) IV Push once  dextrose 50% Injectable 25 Gram(s) IV Push once  escitalopram 5 milliGRAM(s) Oral daily  famotidine    Tablet 20 milliGRAM(s) Oral daily  heparin  Injectable 5000 Unit(s) SubCutaneous every 8 hours  insulin glargine Injectable (LANTUS) 5 Unit(s) SubCutaneous at bedtime  insulin lispro (HumaLOG) corrective regimen sliding scale   SubCutaneous three times a day before meals  insulin lispro (HumaLOG) corrective regimen sliding scale   SubCutaneous at bedtime  levothyroxine 125 MICROGram(s) Oral daily  losartan 50 milliGRAM(s) Oral daily  montelukast 10 milliGRAM(s) Oral daily    PRN Inpatient Medications  ALBUTerol/ipratropium for Nebulization 3 milliLiter(s) Nebulizer every 6 hours PRN  dextrose 40% Gel 15 Gram(s) Oral once PRN  glucagon  Injectable 1 milliGRAM(s) IntraMuscular once PRN      REVIEW OF SYSTEMS  --------------------------------------------------------------------------------  Gen: No weight changes, fatigue, fevers/chills, weakness  Skin: No rashes  Head/Eyes/Ears/Mouth: No headache  Respiratory: No dyspnea  CV: No chest pain, PND, orthopnea  GI: No abdominal pain, diarrhea, constipation, nausea, vomiting  : No increased frequency, dysuria, hematuria, nocturia  MSK: No joint pain/swelling; no back pain; no edema  Neuro: No dizziness/lightheadedness    All other systems were reviewed and are negative, except as noted.    VITALS/PHYSICAL EXAM  --------------------------------------------------------------------------------  T(C): 36.4 (04-25-19 @ 04:26), Max: 37.1 (04-25-19 @ 00:49)  HR: 58 (04-25-19 @ 04:26) (55 - 64)  BP: 150/80 (04-25-19 @ 04:26) (150/80 - 169/73)  RR: 18 (04-25-19 @ 04:26) (18 - 18)  SpO2: 98% (04-25-19 @ 04:26) (97% - 100%)  Wt(kg): --  Height (cm): 162.56 (04-23-19 @ 17:40)  Weight (kg): 72.3 (04-23-19 @ 17:40)  BMI (kg/m2): 27.4 (04-23-19 @ 17:40)  BSA (m2): 1.78 (04-23-19 @ 17:40)    Physical Exam:  	Gen: NAD  	HEENT: Anicteric  	Pulm: CTA B/L  	CV:  S1S2  	Abd: +BS, soft   	Ext: No B/L Lower ext edema  	Neuro: No focal deficits  	Skin: Warm  	Vascular: No cyanosis    LABS/STUDIES  --------------------------------------------------------------------------------              11.8   9.5   >-----------<  308      [04-25-19 @ 07:03]              36.4     125  |  87  |  12  ----------------------------<  70      [04-25-19 @ 07:01]  4.4   |  26  |  1.27        Ca     9.7     [04-25-19 @ 07:01]      Mg     2.0     [04-25-19 @ 07:01]      Phos  3.6     [04-25-19 @ 07:01]    TPro  6.0  /  Alb  3.6  /  TBili  0.4  /  DBili  x   /  AST  16  /  ALT  11  /  AlkPhos  77  [04-23-19 @ 13:59]    Uric acid 3.4      [04-23-19 @ 13:59]  Serum Osmolality 264      [04-23-19 @ 14:47]    Creatinine Trend:  SCr 1.27 [04-25 @ 07:01]  SCr 1.17 [04-24 @ 22:30]  SCr 1.26 [04-24 @ 15:37]  SCr 1.23 [04-24 @ 07:13]  SCr 1.21 [04-23 @ 22:59]    Urinalysis - [04-23-19 @ 15:28]      Color Yellow / Appearance Turbid / SG 1.008 / pH 7.0      Gluc Negative / Ketone Negative  / Bili Negative / Urobili Negative       Blood Small / Protein 30 mg/dL / Leuk Est Large / Nitrite Negative      RBC 3-5 / WBC >50 / Hyaline 0 / Gran  / Sq Epi  / Non Sq Epi occ / Bacteria Few    Urine Creatinine 35      [04-23-19 @ 15:27]  Urine Sodium 52      [04-23-19 @ 15:27]  Urine Potassium 35      [04-23-19 @ 15:27]  Urine Osmolality 243      [04-23-19 @ 15:27]    Iron 42, TIBC 216, %sat 19      [10-16-18 @ 09:40]  Ferritin 482      [10-16-18 @ 08:17]  HbA1c 6.1      [04-24-19 @ 10:49]  TSH 8.49      [04-23-19 @ 17:28]  Lipid: chol 186, , HDL 48,       [10-16-18 @ 12:54]

## 2019-04-25 NOTE — PROGRESS NOTE ADULT - PROBLEM SELECTOR PLAN 4
- [id number] 7, Right ABELARDO cerebral infarct 07/2018  - was previously on Eliquis, discontinued 4 months ago due to dark stools   - c/w rate control coreg 6.25mg BID

## 2019-04-25 NOTE — PROGRESS NOTE ADULT - SUBJECTIVE AND OBJECTIVE BOX
Patient is a 89y old  Female who presents with a chief complaint of Hyponatremia (2019 07:16)      SUBJECTIVE / OVERNIGHT EVENTS:    MEDICATIONS  (STANDING):  amLODIPine   Tablet 10 milliGRAM(s) Oral daily  aspirin  chewable 81 milliGRAM(s) Oral daily  atorvastatin 10 milliGRAM(s) Oral at bedtime  carvedilol 6.25 milliGRAM(s) Oral every 12 hours  dextrose 5%. 1000 milliLiter(s) (50 mL/Hr) IV Continuous <Continuous>  dextrose 50% Injectable 12.5 Gram(s) IV Push once  dextrose 50% Injectable 25 Gram(s) IV Push once  dextrose 50% Injectable 25 Gram(s) IV Push once  escitalopram 5 milliGRAM(s) Oral daily  famotidine    Tablet 20 milliGRAM(s) Oral daily  heparin  Injectable 5000 Unit(s) SubCutaneous every 8 hours  insulin glargine Injectable (LANTUS) 5 Unit(s) SubCutaneous at bedtime  insulin lispro (HumaLOG) corrective regimen sliding scale   SubCutaneous three times a day before meals  insulin lispro (HumaLOG) corrective regimen sliding scale   SubCutaneous at bedtime  levothyroxine 125 MICROGram(s) Oral daily  losartan 50 milliGRAM(s) Oral daily  montelukast 10 milliGRAM(s) Oral daily    MEDICATIONS  (PRN):  ALBUTerol/ipratropium for Nebulization 3 milliLiter(s) Nebulizer every 6 hours PRN Shortness of Breath and/or Wheezing  dextrose 40% Gel 15 Gram(s) Oral once PRN Blood Glucose LESS THAN 70 milliGRAM(s)/deciliter  glucagon  Injectable 1 milliGRAM(s) IntraMuscular once PRN Glucose LESS THAN 70 milligrams/deciliter      Vital Signs Last 24 Hrs  T(C): 36.4 (2019 04:26), Max: 37.1 (2019 00:49)  T(F): 97.6 (2019 04:26), Max: 98.8 (2019 00:49)  HR: 58 (2019 04:26) (55 - 64)  BP: 150/80 (2019 04:26) (150/80 - 169/73)  BP(mean): --  RR: 18 (2019 04:26) (18 - 18)  SpO2: 98% (2019 04:26) (97% - 100%)  CAPILLARY BLOOD GLUCOSE      POCT Blood Glucose.: 92 mg/dL (2019 22:54)  POCT Blood Glucose.: 96 mg/dL (2019 21:29)  POCT Blood Glucose.: 114 mg/dL (2019 17:55)  POCT Blood Glucose.: 149 mg/dL (2019 13:32)  POCT Blood Glucose.: 107 mg/dL (2019 10:01)  POCT Blood Glucose.: 94 mg/dL (2019 09:50)  POCT Blood Glucose.: 64 mg/dL (2019 09:24)  POCT Blood Glucose.: 61 mg/dL (2019 09:22)    I&O's Summary      PHYSICAL EXAM:            LABS:                        12.3   10.7  )-----------( 308      ( 2019 13:59 )             37.6     04-24    124<L>  |  88<L>  |  12  ----------------------------<  91  4.5   |  27  |  1.17    Ca    9.1      2019 22:30  Phos  3.5     -24  Mg     2.0     -24    TPro  6.0  /  Alb  3.6  /  TBili  0.4  /  DBili  x   /  AST  16  /  ALT  11  /  AlkPhos  77  04-23          Urinalysis Basic - ( 2019 15:28 )    Color: Yellow / Appearance: Turbid / S.008 / pH: x  Gluc: x / Ketone: Negative  / Bili: Negative / Urobili: Negative   Blood: x / Protein: 30 mg/dL / Nitrite: Negative   Leuk Esterase: Large / RBC: 3-5 /hpf / WBC >50 /HPF   Sq Epi: x / Non Sq Epi: occ / Bacteria: Few Patient is a 89y old  Female who presents with a chief complaint of Hyponatremia (2019 07:16)      SUBJECTIVE / OVERNIGHT EVENTS: Patient feels well, endorses overall pain but no focal areas of discomfort or pain. Is not in distress.     MEDICATIONS  (STANDING):  amLODIPine   Tablet 10 milliGRAM(s) Oral daily  aspirin  chewable 81 milliGRAM(s) Oral daily  atorvastatin 10 milliGRAM(s) Oral at bedtime  carvedilol 6.25 milliGRAM(s) Oral every 12 hours  dextrose 5%. 1000 milliLiter(s) (50 mL/Hr) IV Continuous <Continuous>  dextrose 50% Injectable 12.5 Gram(s) IV Push once  dextrose 50% Injectable 25 Gram(s) IV Push once  dextrose 50% Injectable 25 Gram(s) IV Push once  escitalopram 5 milliGRAM(s) Oral daily  famotidine    Tablet 20 milliGRAM(s) Oral daily  heparin  Injectable 5000 Unit(s) SubCutaneous every 8 hours  insulin glargine Injectable (LANTUS) 5 Unit(s) SubCutaneous at bedtime  insulin lispro (HumaLOG) corrective regimen sliding scale   SubCutaneous three times a day before meals  insulin lispro (HumaLOG) corrective regimen sliding scale   SubCutaneous at bedtime  levothyroxine 125 MICROGram(s) Oral daily  losartan 50 milliGRAM(s) Oral daily  montelukast 10 milliGRAM(s) Oral daily    MEDICATIONS  (PRN):  ALBUTerol/ipratropium for Nebulization 3 milliLiter(s) Nebulizer every 6 hours PRN Shortness of Breath and/or Wheezing  dextrose 40% Gel 15 Gram(s) Oral once PRN Blood Glucose LESS THAN 70 milliGRAM(s)/deciliter  glucagon  Injectable 1 milliGRAM(s) IntraMuscular once PRN Glucose LESS THAN 70 milligrams/deciliter      Vital Signs Last 24 Hrs  T(C): 36.4 (2019 04:26), Max: 37.1 (2019 00:49)  T(F): 97.6 (2019 04:26), Max: 98.8 (2019 00:49)  HR: 58 (2019 04:26) (55 - 64)  BP: 150/80 (2019 04:26) (150/80 - 169/73)  BP(mean): --  RR: 18 (2019 04:26) (18 - 18)  SpO2: 98% (2019 04:26) (97% - 100%)  CAPILLARY BLOOD GLUCOSE      POCT Blood Glucose.: 92 mg/dL (2019 22:54)  POCT Blood Glucose.: 96 mg/dL (2019 21:29)  POCT Blood Glucose.: 114 mg/dL (2019 17:55)  POCT Blood Glucose.: 149 mg/dL (2019 13:32)  POCT Blood Glucose.: 107 mg/dL (2019 10:01)  POCT Blood Glucose.: 94 mg/dL (2019 09:50)  POCT Blood Glucose.: 64 mg/dL (2019 09:24)  POCT Blood Glucose.: 61 mg/dL (2019 09:22)    I&O's Summary      PHYSICAL EXAM:    	General: WN/WD NAD  	Neurology: A&Ox1, nonfocal  	Eyes: PERRLA/ EOMI, Gross vision intact  	ENT/Neck: Neck supple, trachea midline, No JVD, Gross hearing intact  	Respiratory: CTA B/L, No wheezing, rales, rhonchi  	CV: RRR, S1S2, no murmurs, rubs or gallops  	Abdominal: Soft, NT, ND +BS,   	Extremities: No edema, + peripheral pulses  Skin: No Rashes, Hematoma, Ecchymosis          LABS:                        12.3   10.7  )-----------( 308      ( 2019 13:59 )             37.6     04-24    124<L>  |  88<L>  |  12  ----------------------------<  91  4.5   |  27  |  1.17    Ca    9.1      2019 22:30  Phos  3.5     04-24  Mg     2.0     -24    TPro  6.0  /  Alb  3.6  /  TBili  0.4  /  DBili  x   /  AST  16  /  ALT  11  /  AlkPhos  77  04-23          Urinalysis Basic - ( 2019 15:28 )    Color: Yellow / Appearance: Turbid / S.008 / pH: x  Gluc: x / Ketone: Negative  / Bili: Negative / Urobili: Negative   Blood: x / Protein: 30 mg/dL / Nitrite: Negative   Leuk Esterase: Large / RBC: 3-5 /hpf / WBC >50 /HPF   Sq Epi: x / Non Sq Epi: occ / Bacteria: Few

## 2019-04-25 NOTE — PROGRESS NOTE ADULT - PROBLEM SELECTOR PLAN 2
- TSH on 4/18 outpatient labs elevated to 10  - pt. currently takes levothyroxine 112mcg daily, was recommended to take increased dose however new Rx not sent per family  - will increase to 125mcg daily, repeat TFTs as outpatient in 4-6 weeks - TSH on 4/18 outpatient labs elevated to 10  - pt. currently takes levothyroxine 112mcg daily, was recommended to take increased dose however new Rx not sent per family, increased now to 125mcg  - repeat TFTs as outpatient in 4-6 weeks

## 2019-04-25 NOTE — PROGRESS NOTE ADULT - PROBLEM SELECTOR PLAN 1
Patient has chronic asymptomatic? Euvolemic hyponatremia. This is likely secondary to poor PO intake, increased free water intake. Free water restrict the patient for now and encourage salt/protein intake.   Check sodium q12h. Serum sodium improved to 125 today.  Consider adjusting/stopping SSRI if hyponatremia is non-resolving.

## 2019-04-26 DIAGNOSIS — R53.2 FUNCTIONAL QUADRIPLEGIA: ICD-10-CM

## 2019-04-26 LAB
ANION GAP SERPL CALC-SCNC: 10 MMOL/L — SIGNIFICANT CHANGE UP (ref 5–17)
ANION GAP SERPL CALC-SCNC: 11 MMOL/L — SIGNIFICANT CHANGE UP (ref 5–17)
BUN SERPL-MCNC: 13 MG/DL — SIGNIFICANT CHANGE UP (ref 7–23)
BUN SERPL-MCNC: 15 MG/DL — SIGNIFICANT CHANGE UP (ref 7–23)
CALCIUM SERPL-MCNC: 9.4 MG/DL — SIGNIFICANT CHANGE UP (ref 8.4–10.5)
CALCIUM SERPL-MCNC: 9.4 MG/DL — SIGNIFICANT CHANGE UP (ref 8.4–10.5)
CHLORIDE SERPL-SCNC: 90 MMOL/L — LOW (ref 96–108)
CHLORIDE SERPL-SCNC: 90 MMOL/L — LOW (ref 96–108)
CO2 SERPL-SCNC: 25 MMOL/L — SIGNIFICANT CHANGE UP (ref 22–31)
CO2 SERPL-SCNC: 28 MMOL/L — SIGNIFICANT CHANGE UP (ref 22–31)
CREAT SERPL-MCNC: 1.22 MG/DL — SIGNIFICANT CHANGE UP (ref 0.5–1.3)
CREAT SERPL-MCNC: 1.38 MG/DL — HIGH (ref 0.5–1.3)
GLUCOSE SERPL-MCNC: 67 MG/DL — LOW (ref 70–99)
GLUCOSE SERPL-MCNC: 81 MG/DL — SIGNIFICANT CHANGE UP (ref 70–99)
MAGNESIUM SERPL-MCNC: 2 MG/DL — SIGNIFICANT CHANGE UP (ref 1.6–2.6)
PHOSPHATE SERPL-MCNC: 3.3 MG/DL — SIGNIFICANT CHANGE UP (ref 2.5–4.5)
POTASSIUM SERPL-MCNC: 4.2 MMOL/L — SIGNIFICANT CHANGE UP (ref 3.5–5.3)
POTASSIUM SERPL-MCNC: 4.6 MMOL/L — SIGNIFICANT CHANGE UP (ref 3.5–5.3)
POTASSIUM SERPL-SCNC: 4.2 MMOL/L — SIGNIFICANT CHANGE UP (ref 3.5–5.3)
POTASSIUM SERPL-SCNC: 4.6 MMOL/L — SIGNIFICANT CHANGE UP (ref 3.5–5.3)
SODIUM SERPL-SCNC: 126 MMOL/L — LOW (ref 135–145)
SODIUM SERPL-SCNC: 128 MMOL/L — LOW (ref 135–145)

## 2019-04-26 PROCEDURE — 99232 SBSQ HOSP IP/OBS MODERATE 35: CPT | Mod: GC

## 2019-04-26 RX ORDER — SODIUM CHLORIDE 9 MG/ML
1 INJECTION INTRAMUSCULAR; INTRAVENOUS; SUBCUTANEOUS ONCE
Qty: 0 | Refills: 0 | Status: COMPLETED | OUTPATIENT
Start: 2019-04-26 | End: 2019-04-26

## 2019-04-26 RX ORDER — SODIUM CHLORIDE 9 MG/ML
2 INJECTION INTRAMUSCULAR; INTRAVENOUS; SUBCUTANEOUS
Qty: 0 | Refills: 0 | Status: DISCONTINUED | OUTPATIENT
Start: 2019-04-26 | End: 2019-04-27

## 2019-04-26 RX ORDER — CARVEDILOL PHOSPHATE 80 MG/1
3.12 CAPSULE, EXTENDED RELEASE ORAL EVERY 12 HOURS
Qty: 0 | Refills: 0 | Status: DISCONTINUED | OUTPATIENT
Start: 2019-04-26 | End: 2019-04-28

## 2019-04-26 RX ADMIN — SODIUM CHLORIDE 1 GRAM(S): 9 INJECTION INTRAMUSCULAR; INTRAVENOUS; SUBCUTANEOUS at 05:01

## 2019-04-26 RX ADMIN — AMLODIPINE BESYLATE 10 MILLIGRAM(S): 2.5 TABLET ORAL at 22:38

## 2019-04-26 RX ADMIN — ESCITALOPRAM OXALATE 5 MILLIGRAM(S): 10 TABLET, FILM COATED ORAL at 11:33

## 2019-04-26 RX ADMIN — INSULIN GLARGINE 5 UNIT(S): 100 INJECTION, SOLUTION SUBCUTANEOUS at 22:39

## 2019-04-26 RX ADMIN — Medication 125 MICROGRAM(S): at 05:01

## 2019-04-26 RX ADMIN — FAMOTIDINE 20 MILLIGRAM(S): 10 INJECTION INTRAVENOUS at 11:33

## 2019-04-26 RX ADMIN — ATORVASTATIN CALCIUM 10 MILLIGRAM(S): 80 TABLET, FILM COATED ORAL at 22:38

## 2019-04-26 RX ADMIN — SODIUM CHLORIDE 1 GRAM(S): 9 INJECTION INTRAMUSCULAR; INTRAVENOUS; SUBCUTANEOUS at 10:35

## 2019-04-26 RX ADMIN — CARVEDILOL PHOSPHATE 6.25 MILLIGRAM(S): 80 CAPSULE, EXTENDED RELEASE ORAL at 05:01

## 2019-04-26 RX ADMIN — HEPARIN SODIUM 5000 UNIT(S): 5000 INJECTION INTRAVENOUS; SUBCUTANEOUS at 22:39

## 2019-04-26 RX ADMIN — SODIUM CHLORIDE 2 GRAM(S): 9 INJECTION INTRAMUSCULAR; INTRAVENOUS; SUBCUTANEOUS at 17:59

## 2019-04-26 RX ADMIN — LOSARTAN POTASSIUM 50 MILLIGRAM(S): 100 TABLET, FILM COATED ORAL at 05:01

## 2019-04-26 RX ADMIN — MONTELUKAST 10 MILLIGRAM(S): 4 TABLET, CHEWABLE ORAL at 22:38

## 2019-04-26 RX ADMIN — HEPARIN SODIUM 5000 UNIT(S): 5000 INJECTION INTRAVENOUS; SUBCUTANEOUS at 14:09

## 2019-04-26 RX ADMIN — Medication 81 MILLIGRAM(S): at 11:33

## 2019-04-26 RX ADMIN — HEPARIN SODIUM 5000 UNIT(S): 5000 INJECTION INTRAVENOUS; SUBCUTANEOUS at 05:01

## 2019-04-26 NOTE — PROGRESS NOTE ADULT - PROBLEM SELECTOR PLAN 1
Patient has chronic asymptomatic? Euvolemic hyponatremia. This is likely secondary to poor PO intake, increased free water intake. Free water restrict the patient for now and encourage salt/protein intake.   Serum sodium improved to 126 today. Recommend start salt tabs 1g BID today if pt remains eating poorly. Monitor BP while on salt tabs.   Consider adjusting/stopping SSRI if hyponatremia is non-resolving.

## 2019-04-26 NOTE — PROGRESS NOTE ADULT - PROBLEM SELECTOR PLAN 2
- TSH on 4/18 outpatient labs elevated to 10  - pt. currently takes levothyroxine 112mcg daily, was recommended to take increased dose however new Rx not sent per family, increased now to 125mcg  - repeat TFTs as outpatient in 4-6 weeks - levothyroxine increased to 125mcg  - repeat TFTs as outpatient in 4-6 weeks

## 2019-04-26 NOTE — PROGRESS NOTE ADULT - PROBLEM SELECTOR PLAN 4
- [id number] 7, Right ABELARDO cerebral infarct 07/2018  - was previously on Eliquis, discontinued 4 months ago due to dark stools   - c/w rate control coreg 6.25mg BID - [id number] 7, Right ABELARDO cerebral infarct 07/2018  - was previously on Eliquis, discontinued 4 months ago due to dark stools   - c/w rate control coreg 3.125 mg BID

## 2019-04-26 NOTE — PROGRESS NOTE ADULT - PROBLEM SELECTOR PLAN 1
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   - likely euvolemic hyponatremia 2/2 SIADH vs increased fluid intake w/o solute intake  - d/w renal fellow, will fluid restrict, monitor BMP [id number] or sooner if any neurologic abnormalities (at baseline AAOx1, follows commands, minimally verbal)  - Renal following.  - will titrate off SSRI  - will give a dose of NaCl given that Na is still below our goal of 130. Likely in the setting of less PO intake of solute and SIADH  - serum osm 264 > urine osm 243, urine sodium 52, Urine Specific gravity wnl.   - likely euvolemic hyponatremia 2/2 SIADH vs increased fluid intake w/o solute intake  - on fluid restriction +_ salt tabs  - Renal following, appreciate recs  - will titrate off SSRI presumed to be euvolemic hyponatremia 2/2 SIADH vs increased fluid intake w/o solute intake  - Sodium with mild improvement but not to the goal of 130  - on fluid restriction + salt tabs, will need to increase salt tabs  - Renal following, appreciate recs  - will titrate off SSRI

## 2019-04-26 NOTE — PROGRESS NOTE ADULT - PROBLEM SELECTOR PLAN 8
- A1C 9.6% in 10/2018, recheck in AM  - per family pt. takes Levemir 10 units at bedtime, on [employer] ordered for 6 units  - will start with Lantus 6 units at bedtime, titrate as needed, FS and SSI qac/hs  - hold oral agents (Januvia 25mg daily) - A1C - 6.1%  - Blood glucose is acceptable  - c/w Lantus 5 units at bedtime, titrate as needed, FS and SSI qac/hs  - hold oral agents (Januvia 25mg daily)

## 2019-04-26 NOTE — PROGRESS NOTE ADULT - SUBJECTIVE AND OBJECTIVE BOX
Patient is a 89y old  Female who presents with a chief complaint of Hyponatremia (25 Apr 2019 10:57)      SUBJECTIVE / OVERNIGHT EVENTS:    MEDICATIONS  (STANDING):  amLODIPine   Tablet 10 milliGRAM(s) Oral daily  aspirin  chewable 81 milliGRAM(s) Oral daily  atorvastatin 10 milliGRAM(s) Oral at bedtime  carvedilol 6.25 milliGRAM(s) Oral every 12 hours  dextrose 5%. 1000 milliLiter(s) (50 mL/Hr) IV Continuous <Continuous>  dextrose 50% Injectable 12.5 Gram(s) IV Push once  dextrose 50% Injectable 25 Gram(s) IV Push once  dextrose 50% Injectable 25 Gram(s) IV Push once  escitalopram 5 milliGRAM(s) Oral daily  famotidine    Tablet 20 milliGRAM(s) Oral daily  heparin  Injectable 5000 Unit(s) SubCutaneous every 8 hours  insulin glargine Injectable (LANTUS) 5 Unit(s) SubCutaneous at bedtime  insulin lispro (HumaLOG) corrective regimen sliding scale   SubCutaneous three times a day before meals  insulin lispro (HumaLOG) corrective regimen sliding scale   SubCutaneous at bedtime  levothyroxine 125 MICROGram(s) Oral daily  losartan 50 milliGRAM(s) Oral daily  montelukast 10 milliGRAM(s) Oral daily  sodium chloride 1 Gram(s) Oral two times a day    MEDICATIONS  (PRN):  ALBUTerol/ipratropium for Nebulization 3 milliLiter(s) Nebulizer every 6 hours PRN Shortness of Breath and/or Wheezing  dextrose 40% Gel 15 Gram(s) Oral once PRN Blood Glucose LESS THAN 70 milliGRAM(s)/deciliter  glucagon  Injectable 1 milliGRAM(s) IntraMuscular once PRN Glucose LESS THAN 70 milligrams/deciliter      Vital Signs Last 24 Hrs  T(C): 36.4 (26 Apr 2019 04:27), Max: 37.4 (25 Apr 2019 17:12)  T(F): 97.5 (26 Apr 2019 04:27), Max: 99.3 (25 Apr 2019 17:12)  HR: 61 (26 Apr 2019 04:27) (60 - 74)  BP: 158/68 (26 Apr 2019 04:27) (146/59 - 160/73)  BP(mean): --  RR: 18 (26 Apr 2019 04:27) (17 - 18)  SpO2: 97% (26 Apr 2019 04:27) (97% - 100%)  CAPILLARY BLOOD GLUCOSE      POCT Blood Glucose.: 126 mg/dL (25 Apr 2019 21:36)  POCT Blood Glucose.: 118 mg/dL (25 Apr 2019 17:28)  POCT Blood Glucose.: 131 mg/dL (25 Apr 2019 12:30)  POCT Blood Glucose.: 94 mg/dL (25 Apr 2019 08:40)    I&O's Summary    25 Apr 2019 07:01  -  26 Apr 2019 07:00  --------------------------------------------------------  IN: 0 mL / OUT: 1250 mL / NET: -1250 mL        PHYSICAL EXAM:                                11.8   9.5   )-----------( 308      ( 25 Apr 2019 07:03 )             36.4     04-26    126<L>  |  90<L>  |  13  ----------------------------<  67<L>  4.2   |  25  |  1.22    Ca    9.4      26 Apr 2019 06:23  Phos  3.3     04-26  Mg     2.0     04-26 Patient is a 89y old  Female who presents with a chief complaint of Hyponatremia (25 Apr 2019 10:57)      SUBJECTIVE / OVERNIGHT EVENTS: Pt feels okay, has no new complaints.     MEDICATIONS  (STANDING):  amLODIPine   Tablet 10 milliGRAM(s) Oral daily  aspirin  chewable 81 milliGRAM(s) Oral daily  atorvastatin 10 milliGRAM(s) Oral at bedtime  carvedilol 6.25 milliGRAM(s) Oral every 12 hours  dextrose 5%. 1000 milliLiter(s) (50 mL/Hr) IV Continuous <Continuous>  dextrose 50% Injectable 12.5 Gram(s) IV Push once  dextrose 50% Injectable 25 Gram(s) IV Push once  dextrose 50% Injectable 25 Gram(s) IV Push once  escitalopram 5 milliGRAM(s) Oral daily  famotidine    Tablet 20 milliGRAM(s) Oral daily  heparin  Injectable 5000 Unit(s) SubCutaneous every 8 hours  insulin glargine Injectable (LANTUS) 5 Unit(s) SubCutaneous at bedtime  insulin lispro (HumaLOG) corrective regimen sliding scale   SubCutaneous three times a day before meals  insulin lispro (HumaLOG) corrective regimen sliding scale   SubCutaneous at bedtime  levothyroxine 125 MICROGram(s) Oral daily  losartan 50 milliGRAM(s) Oral daily  montelukast 10 milliGRAM(s) Oral daily  sodium chloride 1 Gram(s) Oral two times a day    MEDICATIONS  (PRN):  ALBUTerol/ipratropium for Nebulization 3 milliLiter(s) Nebulizer every 6 hours PRN Shortness of Breath and/or Wheezing  dextrose 40% Gel 15 Gram(s) Oral once PRN Blood Glucose LESS THAN 70 milliGRAM(s)/deciliter  glucagon  Injectable 1 milliGRAM(s) IntraMuscular once PRN Glucose LESS THAN 70 milligrams/deciliter      Vital Signs Last 24 Hrs  T(C): 36.4 (26 Apr 2019 04:27), Max: 37.4 (25 Apr 2019 17:12)  T(F): 97.5 (26 Apr 2019 04:27), Max: 99.3 (25 Apr 2019 17:12)  HR: 61 (26 Apr 2019 04:27) (60 - 74)  BP: 158/68 (26 Apr 2019 04:27) (146/59 - 160/73)  BP(mean): --  RR: 18 (26 Apr 2019 04:27) (17 - 18)  SpO2: 97% (26 Apr 2019 04:27) (97% - 100%)  CAPILLARY BLOOD GLUCOSE      POCT Blood Glucose.: 126 mg/dL (25 Apr 2019 21:36)  POCT Blood Glucose.: 118 mg/dL (25 Apr 2019 17:28)  POCT Blood Glucose.: 131 mg/dL (25 Apr 2019 12:30)  POCT Blood Glucose.: 94 mg/dL (25 Apr 2019 08:40)    I&O's Summary    25 Apr 2019 07:01  -  26 Apr 2019 07:00  --------------------------------------------------------  IN: 0 mL / OUT: 1250 mL / NET: -1250 mL        PHYSICAL EXAM:  	General: WN/WD NAD  	Neurology: A&Ox1, nonfocal  	Eyes: PERRLA/ EOMI, Gross vision intact  	ENT/Neck: Neck supple, trachea midline, No JVD, Gross hearing intact  	Respiratory: CTA B/L, No wheezing, rales, rhonchi  	CV: RRR, S1S2, no murmurs, rubs or gallops  	Abdominal: Soft, NT, ND +BS,   	Extremities: No edema, + peripheral pulses  Skin: No Rashes, Hematoma, Ecchymosis                            11.8   9.5   )-----------( 308      ( 25 Apr 2019 07:03 )             36.4     04-26    126<L>  |  90<L>  |  13  ----------------------------<  67<L>  4.2   |  25  |  1.22    Ca    9.4      26 Apr 2019 06:23  Phos  3.3     04-26  Mg     2.0     04-26

## 2019-04-26 NOTE — PROGRESS NOTE ADULT - PROBLEM SELECTOR PLAN 10
- DVT ppx: HSQ TID  - DIET: Carbohydrate Restricted / Pureed + Fluid restriction 1000cc - DVT ppx: HSQ   - DIET: Carbohydrate Restricted / Pureed + Fluid restriction 1000cc with salt tabs

## 2019-04-26 NOTE — PROGRESS NOTE ADULT - SUBJECTIVE AND OBJECTIVE BOX
[facility] DIVISION OF KIDNEY DISEASES AND HYPERTENSION -- FOLLOW UP NOTE  --------------------------------------------------------------------------------  Chief Complaint: Hyponatremia    24 hour events/subjective:  Pt awake. States she is not eating.     PAST HISTORY  --------------------------------------------------------------------------------  No significant changes to PMH, PSH, FHx, SHx, unless otherwise noted    ALLERGIES & MEDICATIONS  --------------------------------------------------------------------------------  Allergies    No Known Allergies    Intolerances      Standing Inpatient Medications  amLODIPine   Tablet 10 milliGRAM(s) Oral daily  aspirin  chewable 81 milliGRAM(s) Oral daily  atorvastatin 10 milliGRAM(s) Oral at bedtime  carvedilol 3.125 milliGRAM(s) Oral every 12 hours  dextrose 5%. 1000 milliLiter(s) IV Continuous <Continuous>  dextrose 50% Injectable 12.5 Gram(s) IV Push once  dextrose 50% Injectable 25 Gram(s) IV Push once  dextrose 50% Injectable 25 Gram(s) IV Push once  escitalopram 5 milliGRAM(s) Oral daily  famotidine    Tablet 20 milliGRAM(s) Oral daily  heparin  Injectable 5000 Unit(s) SubCutaneous every 8 hours  insulin glargine Injectable (LANTUS) 5 Unit(s) SubCutaneous at bedtime  insulin lispro (HumaLOG) corrective regimen sliding scale   SubCutaneous three times a day before meals  insulin lispro (HumaLOG) corrective regimen sliding scale   SubCutaneous at bedtime  levothyroxine 125 MICROGram(s) Oral daily  losartan 50 milliGRAM(s) Oral daily  montelukast 10 milliGRAM(s) Oral daily  sodium chloride 1 Gram(s) Oral two times a day  sodium chloride 1 Gram(s) Oral once    PRN Inpatient Medications  ALBUTerol/ipratropium for Nebulization 3 milliLiter(s) Nebulizer every 6 hours PRN  dextrose 40% Gel 15 Gram(s) Oral once PRN  glucagon  Injectable 1 milliGRAM(s) IntraMuscular once PRN      REVIEW OF SYSTEMS  --------------------------------------------------------------------------------  Gen: No weight changes, fatigue, fevers/chills, weakness  Skin: No rashes  Head/Eyes/Ears/Mouth: No headache  Respiratory: No dyspnea  CV: No chest pain, PND, orthopnea  GI: No abdominal pain, diarrhea, constipation, nausea, vomiting  : No increased frequency, dysuria, hematuria, nocturia  MSK: No joint pain/swelling; no back pain; no edema  Neuro: No dizziness/lightheadedness    All other systems were reviewed and are negative, except as noted.    VITALS/PHYSICAL EXAM  --------------------------------------------------------------------------------  T(C): 37.2 (04-26-19 @ 08:24), Max: 37.4 (04-25-19 @ 17:12)  HR: 58 (04-26-19 @ 08:24) (58 - 74)  BP: 131/70 (04-26-19 @ 08:24) (131/70 - 160/73)  RR: 15 (04-26-19 @ 08:24) (15 - 18)  SpO2: 98% (04-26-19 @ 08:24) (97% - 100%)  Wt(kg): --      04-25-19 @ 07:01  -  04-26-19 @ 07:00  --------------------------------------------------------  IN: 0 mL / OUT: 1250 mL / NET: -1250 mL    Physical Exam:  	Gen: NAD  	HEENT: Anicteric  	Pulm: CTA B/L  	CV:  S1S2  	Abd: +BS, soft   	Ext: No B/L Lower ext edema  	Skin: Warm  	Vascular: No cyanosis    LABS/STUDIES  --------------------------------------------------------------------------------              11.8   9.5   >-----------<  308      [04-25-19 @ 07:03]              36.4     126  |  90  |  13  ----------------------------<  67      [04-26-19 @ 06:23]  4.2   |  25  |  1.22        Ca     9.4     [04-26-19 @ 06:23]      Mg     2.0     [04-26-19 @ 06:23]      Phos  3.3     [04-26-19 @ 06:23]    Creatinine Trend:  SCr 1.22 [04-26 @ 06:23]  SCr 1.21 [04-25 @ 14:22]  SCr 1.27 [04-25 @ 07:01]  SCr 1.17 [04-24 @ 22:30]  SCr 1.26 [04-24 @ 15:37]    Urinalysis - [04-23-19 @ 15:28]      Color Yellow / Appearance Turbid / SG 1.008 / pH 7.0      Gluc Negative / Ketone Negative  / Bili Negative / Urobili Negative       Blood Small / Protein 30 mg/dL / Leuk Est Large / Nitrite Negative      RBC 3-5 / WBC >50 / Hyaline 0 / Gran  / Sq Epi  / Non Sq Epi occ / Bacteria Few    Urine Creatinine 35      [04-23-19 @ 15:27]  Urine Sodium 52      [04-23-19 @ 15:27]  Urine Potassium 35      [04-23-19 @ 15:27]  Urine Osmolality 243      [04-23-19 @ 15:27]    Iron 42, TIBC 216, %sat 19      [10-16-18 @ 09:40]  Ferritin 482      [10-16-18 @ 08:17]  HbA1c 6.1      [04-24-19 @ 10:49]  TSH 8.49      [04-23-19 @ 17:28]  Lipid: chol 186, , HDL 48,       [10-16-18 @ 12:54]

## 2019-04-26 NOTE — PROGRESS NOTE ADULT - PROBLEM SELECTOR PLAN 3
- mildly elevated 10.7  - no signs or symptoms of infection, afebrile  - trend CBC, monitor off abx resolved

## 2019-04-26 NOTE — PROGRESS NOTE ADULT - PROBLEM SELECTOR PLAN 9
- pt. has three children (2 daughters and 1 son)  - pt. has dementia, son who is a physician is HCP, family will bring in documentation  - they do not have a living will or MOLST form, daughter at bedside will discuss with other siblings regarding code status but presumes code status to be DNR/DNI Patient requires full assistance with all ADLs

## 2019-04-27 ENCOUNTER — TRANSCRIPTION ENCOUNTER (OUTPATIENT)
Age: 84
End: 2019-04-27

## 2019-04-27 LAB
ANION GAP SERPL CALC-SCNC: 14 MMOL/L — SIGNIFICANT CHANGE UP (ref 5–17)
BUN SERPL-MCNC: 16 MG/DL — SIGNIFICANT CHANGE UP (ref 7–23)
CALCIUM SERPL-MCNC: 9.6 MG/DL — SIGNIFICANT CHANGE UP (ref 8.4–10.5)
CHLORIDE SERPL-SCNC: 94 MMOL/L — LOW (ref 96–108)
CO2 SERPL-SCNC: 24 MMOL/L — SIGNIFICANT CHANGE UP (ref 22–31)
CREAT SERPL-MCNC: 1.39 MG/DL — HIGH (ref 0.5–1.3)
GLUCOSE SERPL-MCNC: 82 MG/DL — SIGNIFICANT CHANGE UP (ref 70–99)
MAGNESIUM SERPL-MCNC: 2.1 MG/DL — SIGNIFICANT CHANGE UP (ref 1.6–2.6)
PHOSPHATE SERPL-MCNC: 3.4 MG/DL — SIGNIFICANT CHANGE UP (ref 2.5–4.5)
POTASSIUM SERPL-MCNC: 4.5 MMOL/L — SIGNIFICANT CHANGE UP (ref 3.5–5.3)
POTASSIUM SERPL-SCNC: 4.5 MMOL/L — SIGNIFICANT CHANGE UP (ref 3.5–5.3)
SODIUM SERPL-SCNC: 132 MMOL/L — LOW (ref 135–145)

## 2019-04-27 PROCEDURE — 99232 SBSQ HOSP IP/OBS MODERATE 35: CPT | Mod: GC

## 2019-04-27 RX ORDER — SODIUM CHLORIDE 9 MG/ML
1 INJECTION INTRAMUSCULAR; INTRAVENOUS; SUBCUTANEOUS
Qty: 0 | Refills: 0 | Status: DISCONTINUED | OUTPATIENT
Start: 2019-04-27 | End: 2019-04-28

## 2019-04-27 RX ORDER — ESCITALOPRAM OXALATE 10 MG/1
1 TABLET, FILM COATED ORAL
Qty: 0 | Refills: 0 | COMMUNITY

## 2019-04-27 RX ORDER — LEVOTHYROXINE SODIUM 125 MCG
1 TABLET ORAL
Qty: 0 | Refills: 0 | COMMUNITY

## 2019-04-27 RX ORDER — LEVOTHYROXINE SODIUM 125 MCG
1 TABLET ORAL
Qty: 30 | Refills: 0
Start: 2019-04-27 | End: 2019-05-26

## 2019-04-27 RX ORDER — ESCITALOPRAM OXALATE 10 MG/1
1 TABLET, FILM COATED ORAL
Qty: 0 | Refills: 0 | COMMUNITY
Start: 2019-04-27

## 2019-04-27 RX ORDER — CARVEDILOL PHOSPHATE 80 MG/1
2 CAPSULE, EXTENDED RELEASE ORAL
Qty: 0 | Refills: 0 | COMMUNITY

## 2019-04-27 RX ORDER — CARVEDILOL PHOSPHATE 80 MG/1
1 CAPSULE, EXTENDED RELEASE ORAL
Qty: 0 | Refills: 0 | DISCHARGE
Start: 2019-04-27

## 2019-04-27 RX ORDER — LEVOTHYROXINE SODIUM 125 MCG
1 TABLET ORAL
Qty: 0 | Refills: 0 | COMMUNITY
Start: 2019-04-27

## 2019-04-27 RX ORDER — ESCITALOPRAM OXALATE 10 MG/1
1 TABLET, FILM COATED ORAL
Qty: 30 | Refills: 0 | OUTPATIENT
Start: 2019-04-27 | End: 2019-05-26

## 2019-04-27 RX ORDER — SODIUM CHLORIDE 9 MG/ML
1 INJECTION INTRAMUSCULAR; INTRAVENOUS; SUBCUTANEOUS
Qty: 60 | Refills: 0 | OUTPATIENT
Start: 2019-04-27 | End: 2019-05-26

## 2019-04-27 RX ADMIN — ESCITALOPRAM OXALATE 5 MILLIGRAM(S): 10 TABLET, FILM COATED ORAL at 13:11

## 2019-04-27 RX ADMIN — FAMOTIDINE 20 MILLIGRAM(S): 10 INJECTION INTRAVENOUS at 13:11

## 2019-04-27 RX ADMIN — SODIUM CHLORIDE 2 GRAM(S): 9 INJECTION INTRAMUSCULAR; INTRAVENOUS; SUBCUTANEOUS at 06:52

## 2019-04-27 RX ADMIN — ATORVASTATIN CALCIUM 10 MILLIGRAM(S): 80 TABLET, FILM COATED ORAL at 22:25

## 2019-04-27 RX ADMIN — HEPARIN SODIUM 5000 UNIT(S): 5000 INJECTION INTRAVENOUS; SUBCUTANEOUS at 06:48

## 2019-04-27 RX ADMIN — CARVEDILOL PHOSPHATE 3.12 MILLIGRAM(S): 80 CAPSULE, EXTENDED RELEASE ORAL at 06:55

## 2019-04-27 RX ADMIN — Medication 125 MICROGRAM(S): at 06:47

## 2019-04-27 RX ADMIN — AMLODIPINE BESYLATE 10 MILLIGRAM(S): 2.5 TABLET ORAL at 22:24

## 2019-04-27 RX ADMIN — INSULIN GLARGINE 5 UNIT(S): 100 INJECTION, SOLUTION SUBCUTANEOUS at 22:39

## 2019-04-27 RX ADMIN — LOSARTAN POTASSIUM 50 MILLIGRAM(S): 100 TABLET, FILM COATED ORAL at 06:47

## 2019-04-27 RX ADMIN — MONTELUKAST 10 MILLIGRAM(S): 4 TABLET, CHEWABLE ORAL at 22:25

## 2019-04-27 RX ADMIN — Medication 81 MILLIGRAM(S): at 13:11

## 2019-04-27 RX ADMIN — CARVEDILOL PHOSPHATE 3.12 MILLIGRAM(S): 80 CAPSULE, EXTENDED RELEASE ORAL at 17:28

## 2019-04-27 RX ADMIN — SODIUM CHLORIDE 1 GRAM(S): 9 INJECTION INTRAMUSCULAR; INTRAVENOUS; SUBCUTANEOUS at 17:28

## 2019-04-27 RX ADMIN — HEPARIN SODIUM 5000 UNIT(S): 5000 INJECTION INTRAVENOUS; SUBCUTANEOUS at 22:25

## 2019-04-27 RX ADMIN — HEPARIN SODIUM 5000 UNIT(S): 5000 INJECTION INTRAVENOUS; SUBCUTANEOUS at 13:12

## 2019-04-27 NOTE — PROGRESS NOTE ADULT - PROBLEM SELECTOR PLAN 4
- [id number], Right ABELARDO cerebral infarct 07/2018  - was previously on Eliquis, discontinued 4 months ago due to dark stools   - c/w rate control coreg 3.125 mg BID

## 2019-04-27 NOTE — PROGRESS NOTE ADULT - SUBJECTIVE AND OBJECTIVE BOX
Patient is a 89y old  Female who presents with a chief complaint of Hyponatremia (26 Apr 2019 10:02)      SUBJECTIVE / OVERNIGHT EVENTS:    MEDICATIONS  (STANDING):  amLODIPine   Tablet 10 milliGRAM(s) Oral daily  aspirin  chewable 81 milliGRAM(s) Oral daily  atorvastatin 10 milliGRAM(s) Oral at bedtime  carvedilol 3.125 milliGRAM(s) Oral every 12 hours  dextrose 5%. 1000 milliLiter(s) (50 mL/Hr) IV Continuous <Continuous>  dextrose 50% Injectable 12.5 Gram(s) IV Push once  dextrose 50% Injectable 25 Gram(s) IV Push once  dextrose 50% Injectable 25 Gram(s) IV Push once  escitalopram 5 milliGRAM(s) Oral daily  famotidine    Tablet 20 milliGRAM(s) Oral daily  heparin  Injectable 5000 Unit(s) SubCutaneous every 8 hours  insulin glargine Injectable (LANTUS) 5 Unit(s) SubCutaneous at bedtime  insulin lispro (HumaLOG) corrective regimen sliding scale   SubCutaneous three times a day before meals  insulin lispro (HumaLOG) corrective regimen sliding scale   SubCutaneous at bedtime  levothyroxine 125 MICROGram(s) Oral daily  losartan 50 milliGRAM(s) Oral daily  montelukast 10 milliGRAM(s) Oral daily  sodium chloride 2 Gram(s) Oral two times a day    MEDICATIONS  (PRN):  ALBUTerol/ipratropium for Nebulization 3 milliLiter(s) Nebulizer every 6 hours PRN Shortness of Breath and/or Wheezing  dextrose 40% Gel 15 Gram(s) Oral once PRN Blood Glucose LESS THAN 70 milliGRAM(s)/deciliter  glucagon  Injectable 1 milliGRAM(s) IntraMuscular once PRN Glucose LESS THAN 70 milligrams/deciliter      Vital Signs Last 24 Hrs  T(C): 36.8 (27 Apr 2019 04:26), Max: 37.2 (26 Apr 2019 08:24)  T(F): 98.3 (27 Apr 2019 04:26), Max: 99 (26 Apr 2019 08:24)  HR: 60 (27 Apr 2019 04:26) (58 - 62)  BP: 130/68 (27 Apr 2019 04:26) (130/68 - 152/74)  BP(mean): --  RR: 18 (27 Apr 2019 04:26) (15 - 18)  SpO2: 96% (27 Apr 2019 04:26) (96% - 99%)  CAPILLARY BLOOD GLUCOSE      POCT Blood Glucose.: 116 mg/dL (26 Apr 2019 21:47)  POCT Blood Glucose.: 89 mg/dL (26 Apr 2019 17:32)  POCT Blood Glucose.: 87 mg/dL (26 Apr 2019 12:49)  POCT Blood Glucose.: 74 mg/dL (26 Apr 2019 07:35)    I&O's Summary    26 Apr 2019 07:01  -  27 Apr 2019 07:00  --------------------------------------------------------  IN: 200 mL / OUT: 200 mL / NET: 0 mL        PHYSICAL EXAM:                LABS:    04-26    128<L>  |  90<L>  |  15  ----------------------------<  81  4.6   |  28  |  1.38<H>    Ca    9.4      26 Apr 2019 14:03  Phos  3.3     04-26  Mg     2.0     04-26 Patient is a 89y old  Female who presents with a chief complaint of Hyponatremia (26 Apr 2019 10:02)      SUBJECTIVE / OVERNIGHT EVENTS:    MEDICATIONS  (STANDING):  amLODIPine   Tablet 10 milliGRAM(s) Oral daily  aspirin  chewable 81 milliGRAM(s) Oral daily  atorvastatin 10 milliGRAM(s) Oral at bedtime  carvedilol 3.125 milliGRAM(s) Oral every 12 hours  dextrose 5%. 1000 milliLiter(s) (50 mL/Hr) IV Continuous <Continuous>  dextrose 50% Injectable 12.5 Gram(s) IV Push once  dextrose 50% Injectable 25 Gram(s) IV Push once  dextrose 50% Injectable 25 Gram(s) IV Push once  escitalopram 5 milliGRAM(s) Oral daily  famotidine    Tablet 20 milliGRAM(s) Oral daily  heparin  Injectable 5000 Unit(s) SubCutaneous every 8 hours  insulin glargine Injectable (LANTUS) 5 Unit(s) SubCutaneous at bedtime  insulin lispro (HumaLOG) corrective regimen sliding scale   SubCutaneous three times a day before meals  insulin lispro (HumaLOG) corrective regimen sliding scale   SubCutaneous at bedtime  levothyroxine 125 MICROGram(s) Oral daily  losartan 50 milliGRAM(s) Oral daily  montelukast 10 milliGRAM(s) Oral daily  sodium chloride 2 Gram(s) Oral two times a day    MEDICATIONS  (PRN):  ALBUTerol/ipratropium for Nebulization 3 milliLiter(s) Nebulizer every 6 hours PRN Shortness of Breath and/or Wheezing  dextrose 40% Gel 15 Gram(s) Oral once PRN Blood Glucose LESS THAN 70 milliGRAM(s)/deciliter  glucagon  Injectable 1 milliGRAM(s) IntraMuscular once PRN Glucose LESS THAN 70 milligrams/deciliter      Vital Signs Last 24 Hrs  T(C): 36.8 (27 Apr 2019 04:26), Max: 37.2 (26 Apr 2019 08:24)  T(F): 98.3 (27 Apr 2019 04:26), Max: 99 (26 Apr 2019 08:24)  HR: 60 (27 Apr 2019 04:26) (58 - 62)  BP: 130/68 (27 Apr 2019 04:26) (130/68 - 152/74)  BP(mean): --  RR: 18 (27 Apr 2019 04:26) (15 - 18)  SpO2: 96% (27 Apr 2019 04:26) (96% - 99%)  CAPILLARY BLOOD GLUCOSE      POCT Blood Glucose.: 116 mg/dL (26 Apr 2019 21:47)  POCT Blood Glucose.: 89 mg/dL (26 Apr 2019 17:32)  POCT Blood Glucose.: 87 mg/dL (26 Apr 2019 12:49)  POCT Blood Glucose.: 74 mg/dL (26 Apr 2019 07:35)    I&O's Summary    26 Apr 2019 07:01  -  27 Apr 2019 07:00  --------------------------------------------------------  IN: 200 mL / OUT: 200 mL / NET: 0 mL        PHYSICAL EXAM:  General: WN/WD NAD  	Neurology: A&Ox1, nonfocal  	Eyes: PERRLA/ EOMI, Gross vision intact  	ENT/Neck: Neck supple, trachea midline, No JVD, Gross hearing intact  	Respiratory: CTA B/L, No wheezing, rales, rhonchi  	CV: RRR, S1S2, no murmurs, rubs or gallops  	Abdominal: Soft, NT, ND +BS,   	Extremities: No edema, + peripheral pulses  Skin: No Rashes, Hematoma, Ecchymosis                             LABS:    04-26    128<L>  |  90<L>  |  15  ----------------------------<  81  4.6   |  28  |  1.38<H>    Ca    9.4      26 Apr 2019 14:03  Phos  3.3     04-26  Mg     2.0     04-26 Patient is a 89y old  Female who presents with a chief complaint of Hyponatremia (26 Apr 2019 10:02)      SUBJECTIVE / OVERNIGHT EVENTS: Patient feels well and has no complaints.     MEDICATIONS  (STANDING):  amLODIPine   Tablet 10 milliGRAM(s) Oral daily  aspirin  chewable 81 milliGRAM(s) Oral daily  atorvastatin 10 milliGRAM(s) Oral at bedtime  carvedilol 3.125 milliGRAM(s) Oral every 12 hours  dextrose 5%. 1000 milliLiter(s) (50 mL/Hr) IV Continuous <Continuous>  dextrose 50% Injectable 12.5 Gram(s) IV Push once  dextrose 50% Injectable 25 Gram(s) IV Push once  dextrose 50% Injectable 25 Gram(s) IV Push once  escitalopram 5 milliGRAM(s) Oral daily  famotidine    Tablet 20 milliGRAM(s) Oral daily  heparin  Injectable 5000 Unit(s) SubCutaneous every 8 hours  insulin glargine Injectable (LANTUS) 5 Unit(s) SubCutaneous at bedtime  insulin lispro (HumaLOG) corrective regimen sliding scale   SubCutaneous three times a day before meals  insulin lispro (HumaLOG) corrective regimen sliding scale   SubCutaneous at bedtime  levothyroxine 125 MICROGram(s) Oral daily  losartan 50 milliGRAM(s) Oral daily  montelukast 10 milliGRAM(s) Oral daily  sodium chloride 2 Gram(s) Oral two times a day    MEDICATIONS  (PRN):  ALBUTerol/ipratropium for Nebulization 3 milliLiter(s) Nebulizer every 6 hours PRN Shortness of Breath and/or Wheezing  dextrose 40% Gel 15 Gram(s) Oral once PRN Blood Glucose LESS THAN 70 milliGRAM(s)/deciliter  glucagon  Injectable 1 milliGRAM(s) IntraMuscular once PRN Glucose LESS THAN 70 milligrams/deciliter      Vital Signs Last 24 Hrs  T(C): 36.8 (27 Apr 2019 04:26), Max: 37.2 (26 Apr 2019 08:24)  T(F): 98.3 (27 Apr 2019 04:26), Max: 99 (26 Apr 2019 08:24)  HR: 60 (27 Apr 2019 04:26) (58 - 62)  BP: 130/68 (27 Apr 2019 04:26) (130/68 - 152/74)  BP(mean): --  RR: 18 (27 Apr 2019 04:26) (15 - 18)  SpO2: 96% (27 Apr 2019 04:26) (96% - 99%)  CAPILLARY BLOOD GLUCOSE      POCT Blood Glucose.: 116 mg/dL (26 Apr 2019 21:47)  POCT Blood Glucose.: 89 mg/dL (26 Apr 2019 17:32)  POCT Blood Glucose.: 87 mg/dL (26 Apr 2019 12:49)  POCT Blood Glucose.: 74 mg/dL (26 Apr 2019 07:35)    I&O's Summary    26 Apr 2019 07:01  -  27 Apr 2019 07:00  --------------------------------------------------------  IN: 200 mL / OUT: 200 mL / NET: 0 mL        PHYSICAL EXAM:  General: WN/WD NAD  	Neurology: A&Ox1, nonfocal  	Eyes: PERRLA/ EOMI, Gross vision intact  	ENT/Neck: Neck supple, trachea midline, No JVD, Gross hearing intact  	Respiratory: CTA B/L, No wheezing, rales, rhonchi  	CV: RRR, S1S2, no murmurs, rubs or gallops  	Abdominal: Soft, NT, ND +BS,   	Extremities: No edema, + peripheral pulses  Skin: No Rashes, Hematoma, Ecchymosis                             LABS:    04-26    128<L>  |  90<L>  |  15  ----------------------------<  81  4.6   |  28  |  1.38<H>    Ca    9.4      26 Apr 2019 14:03  Phos  3.3     04-26  Mg     2.0     04-26

## 2019-04-27 NOTE — PHYSICAL THERAPY INITIAL EVALUATION ADULT - PERTINENT HX OF CURRENT PROBLEM, REHAB EVAL
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

## 2019-04-27 NOTE — DISCHARGE NOTE PROVIDER - NSDCFUADDINST_GEN_ALL_CORE_FT
- PLEASE FOLLOW UP WITH YOUR PRIMARY CARE DOCTOR TO CHECK TSH AND THYROID HORMONE LEVELS ON THE  NEW DOSE OF LEVOTHYROXINE  - PLEASE FOLLOW UP WITH YOUR PRIMARY DOCTOR TO CHECK SODIUM LEVEL WITHIN 5 DAYS OF DISCHARGE

## 2019-04-27 NOTE — DISCHARGE NOTE NURSING/CASE MANAGEMENT/SOCIAL WORK - NSDCDPATPORTLINK_GEN_ALL_CORE
You can access the [facility], offered by [facility], by registering with the following website: [email or web address]

## 2019-04-27 NOTE — PROGRESS NOTE ADULT - PROBLEM SELECTOR PLAN 5
- CKD stage 3 (baseline SCr 1.1-1.3)  - currently at baseline   - monitor SCr, renally adjust medications - CKD stage 3 (baseline SCr 1.1-1.3)  - currently slightly above baseline but stable  - monitor SCr, renally adjust medications

## 2019-04-27 NOTE — PHYSICAL THERAPY INITIAL EVALUATION ADULT - ADDITIONAL COMMENTS
pt w/ dementia, w/ difficulty to follow commands, unable to obtain hx. As per the [facility], pt lives with 24hrs HHA.

## 2019-04-27 NOTE — DISCHARGE NOTE PROVIDER - CARE PROVIDER_API CALL
WILBERT CALLS TEAM [facility]  Phone: (   )    -  Fax: (   )    -  Follow Up Time:

## 2019-04-27 NOTE — PROGRESS NOTE ADULT - PROBLEM SELECTOR PLAN 7
- SCr at baseline, c/w losartan 50mg daily   - c/w amlodipine 10mg daily and coreg 6.25mg BID - SCr at baseline, c/w losartan 50mg daily   - c/w amlodipine 10mg daily and coreg 3 mg BID

## 2019-04-27 NOTE — PROGRESS NOTE ADULT - PROBLEM SELECTOR PLAN 8
- A1C - 6.1%  - Blood glucose is acceptable  - c/w Lantus 5 units at bedtime, titrate as needed, FS and SSI qac/hs  - hold oral agents (Januvia 25mg daily)

## 2019-04-27 NOTE — PROGRESS NOTE ADULT - PROBLEM SELECTOR PLAN 1
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

## 2019-04-27 NOTE — DISCHARGE NOTE PROVIDER - HOSPITAL COURSE
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 Based on labs, her hyponatremia was likely euvolemic hyponatremia likely 2/2 SIADH vs increased water intake.     Patient was asymptomatic and at baseline mental status as per family. Patient's hyponatremia improved slowly with fluid restriction and salt tabs. Her lexapro dose was reduced as well.     Patient was discharged home on. ..... 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 Based on labs, her hyponatremia was likely euvolemic hyponatremia likely 2/2 SIADH vs increased water intake.     Patient was asymptomatic and at baseline mental status as per family. Patient's hyponatremia improved slowly with fluid restriction and salt tabs. Her lexapro dose was reduced as well.     Patient was discharged home on 4/28/2019 WITH STABLE vitals. 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 Based on labs, her hyponatremia was likely euvolemic hyponatremia likely 2/2 SIADH vs increased water intake.     Patient was asymptomatic and at baseline mental status as per family. Patient's hyponatremia improved slowly with fluid restriction and salt tabs. Her lexapro dose was titrated off upon discharge     Patient was discharged home on 4/28/2019 WITH STABLE vitals.

## 2019-04-27 NOTE — PROGRESS NOTE ADULT - ASSESSMENT
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

## 2019-04-27 NOTE — DISCHARGE NOTE PROVIDER - NSDCCPCAREPLAN_GEN_ALL_CORE_FT
PRINCIPAL DISCHARGE DIAGNOSIS  Diagnosis: Hyponatremia  Assessment and Plan of Treatment: CONTINUE taking in adequate solid and restrict total fluid intake to about a litre a day. PRINCIPAL DISCHARGE DIAGNOSIS  Diagnosis: Hyponatremia  Assessment and Plan of Treatment: You were found to have asymptomatic low sodium blood levels . Your fluid intake was restricted and your medications were adjusted. You were slowly taken off your SSRI medication for depression and started on salt tabs. Your salt levels improved. Please CONTINUE taking in adequate solid and restrict total fluid intake to about a litre a day.  Please follow up with your PCP for ongoing evaluation.      SECONDARY DISCHARGE DIAGNOSES  Diagnosis: Hypothyroidism  Assessment and Plan of Treatment: Your TSH level(thyroid hormone level) was low on blood test. Your thyroid medication was increased. Please take it as prescribed and follow up with your PCP for repeat testing in 6 weeks.

## 2019-04-28 VITALS
SYSTOLIC BLOOD PRESSURE: 143 MMHG | TEMPERATURE: 98 F | DIASTOLIC BLOOD PRESSURE: 78 MMHG | HEART RATE: 80 BPM | OXYGEN SATURATION: 96 % | RESPIRATION RATE: 18 BRPM

## 2019-04-28 PROCEDURE — 82947 ASSAY GLUCOSE BLOOD QUANT: CPT

## 2019-04-28 PROCEDURE — 83935 ASSAY OF URINE OSMOLALITY: CPT

## 2019-04-28 PROCEDURE — 83930 ASSAY OF BLOOD OSMOLALITY: CPT

## 2019-04-28 PROCEDURE — 82570 ASSAY OF URINE CREATININE: CPT

## 2019-04-28 PROCEDURE — 84436 ASSAY OF TOTAL THYROXINE: CPT

## 2019-04-28 PROCEDURE — 99239 HOSP IP/OBS DSCHRG MGMT >30: CPT | Mod: GC

## 2019-04-28 PROCEDURE — 51701 INSERT BLADDER CATHETER: CPT

## 2019-04-28 PROCEDURE — 93005 ELECTROCARDIOGRAM TRACING: CPT

## 2019-04-28 PROCEDURE — 82803 BLOOD GASES ANY COMBINATION: CPT

## 2019-04-28 PROCEDURE — 84550 ASSAY OF BLOOD/URIC ACID: CPT

## 2019-04-28 PROCEDURE — 84480 ASSAY TRIIODOTHYRONINE (T3): CPT

## 2019-04-28 PROCEDURE — 85027 COMPLETE CBC AUTOMATED: CPT

## 2019-04-28 PROCEDURE — 84295 ASSAY OF SERUM SODIUM: CPT

## 2019-04-28 PROCEDURE — 97162 PT EVAL MOD COMPLEX 30 MIN: CPT

## 2019-04-28 PROCEDURE — 82962 GLUCOSE BLOOD TEST: CPT

## 2019-04-28 PROCEDURE — 84100 ASSAY OF PHOSPHORUS: CPT

## 2019-04-28 PROCEDURE — 81001 URINALYSIS AUTO W/SCOPE: CPT

## 2019-04-28 PROCEDURE — 99285 EMERGENCY DEPT VISIT HI MDM: CPT | Mod: 25

## 2019-04-28 PROCEDURE — 82533 TOTAL CORTISOL: CPT

## 2019-04-28 PROCEDURE — 84300 ASSAY OF URINE SODIUM: CPT

## 2019-04-28 PROCEDURE — 84443 ASSAY THYROID STIM HORMONE: CPT

## 2019-04-28 PROCEDURE — 83735 ASSAY OF MAGNESIUM: CPT

## 2019-04-28 PROCEDURE — 84132 ASSAY OF SERUM POTASSIUM: CPT

## 2019-04-28 PROCEDURE — 85014 HEMATOCRIT: CPT

## 2019-04-28 PROCEDURE — 82435 ASSAY OF BLOOD CHLORIDE: CPT

## 2019-04-28 PROCEDURE — 80053 COMPREHEN METABOLIC PANEL: CPT

## 2019-04-28 PROCEDURE — 83036 HEMOGLOBIN GLYCOSYLATED A1C: CPT

## 2019-04-28 PROCEDURE — 80048 BASIC METABOLIC PNL TOTAL CA: CPT

## 2019-04-28 PROCEDURE — 83605 ASSAY OF LACTIC ACID: CPT

## 2019-04-28 PROCEDURE — 84133 ASSAY OF URINE POTASSIUM: CPT

## 2019-04-28 PROCEDURE — 82330 ASSAY OF CALCIUM: CPT

## 2019-04-28 RX ORDER — SODIUM CHLORIDE 9 MG/ML
1 INJECTION INTRAMUSCULAR; INTRAVENOUS; SUBCUTANEOUS DAILY
Qty: 0 | Refills: 0 | Status: DISCONTINUED | OUTPATIENT
Start: 2019-04-28 | End: 2019-04-28

## 2019-04-28 RX ORDER — SODIUM CHLORIDE 9 MG/ML
1 INJECTION INTRAMUSCULAR; INTRAVENOUS; SUBCUTANEOUS
Qty: 30 | Refills: 2
Start: 2019-04-28 | End: 2019-07-26

## 2019-04-28 RX ADMIN — Medication 125 MICROGRAM(S): at 05:48

## 2019-04-28 RX ADMIN — SODIUM CHLORIDE 1 GRAM(S): 9 INJECTION INTRAMUSCULAR; INTRAVENOUS; SUBCUTANEOUS at 05:47

## 2019-04-28 RX ADMIN — LOSARTAN POTASSIUM 50 MILLIGRAM(S): 100 TABLET, FILM COATED ORAL at 05:47

## 2019-04-28 RX ADMIN — HEPARIN SODIUM 5000 UNIT(S): 5000 INJECTION INTRAVENOUS; SUBCUTANEOUS at 05:48

## 2019-04-28 RX ADMIN — SODIUM CHLORIDE 1 GRAM(S): 9 INJECTION INTRAMUSCULAR; INTRAVENOUS; SUBCUTANEOUS at 13:20

## 2019-04-28 RX ADMIN — CARVEDILOL PHOSPHATE 3.12 MILLIGRAM(S): 80 CAPSULE, EXTENDED RELEASE ORAL at 05:48

## 2019-04-28 RX ADMIN — FAMOTIDINE 20 MILLIGRAM(S): 10 INJECTION INTRAVENOUS at 13:20

## 2019-04-28 RX ADMIN — Medication 81 MILLIGRAM(S): at 13:20

## 2019-04-28 NOTE — PROGRESS NOTE ADULT - PROBLEM SELECTOR PLAN 5
- CKD stage 3 (baseline SCr 1.1-1.3)  - currently slightly above baseline but stable  - monitor SCr, renally adjust medications

## 2019-04-28 NOTE — PROGRESS NOTE ADULT - ATTENDING COMMENTS
stable for discharge today.  Discharge time spent: 34 min
fluid restrict.  trend Sodium levels.   will titrate off of SSRI.
hyponatremia is resolving.->c/w fluid restriction, decrease salt tabs and decreased SSRI  patient is without any complaints.  plan for discharge tomorrow with home services.

## 2019-04-28 NOTE — PROGRESS NOTE ADULT - PROBLEM SELECTOR PLAN 4
- [id number] 7, Right ABELARDO cerebral infarct 07/2018  - was previously on Eliquis, discontinued 4 months ago due to dark stools   - c/w rate control coreg 3.125 mg BID

## 2019-04-28 NOTE — PROGRESS NOTE ADULT - PROBLEM SELECTOR PROBLEM 8
Type 2 diabetes mellitus

## 2019-04-28 NOTE — PROGRESS NOTE ADULT - PROBLEM SELECTOR PLAN 6
- c/w asa 81mg daily   - pravastatin 40mg daily interchanged to atorvastatin 40mg daily

## 2019-04-28 NOTE — PROGRESS NOTE ADULT - PROVIDER SPECIALTY LIST ADULT
MANTOUX TUBERCULIN (a.k.a. TB) SKIN TEST      What is Tuberculosis/TB?  Tuberculosis/TB is an infectious disease, which is spread through the air by tiny germs when people cough, sneeze, speak or sing. TB germs are very small and can remain in the air for a long period of time. TB germs most oft  en settle in your lungs, but may also settle in other organs of your body. TB germs can be present in your body without making you ill. This is called TB INFECTION. TB infection cannot be spread to other people. When your  body’s defenses become weak and can no longer control the TB germs they multiply, this is called TB DISEASE. It can take month(s) to year(s) for TB infection to become TB disease. The TB SKIN TEST can show if a person has  been “infected” by TB germs.    How is the Mantoux Tuberculin/TB skin test given?  A very small amount of a product called Purified Protein Derivative (PPD) is injected just under the top layer of the skin on the forearm. Persons who have been infected by the TB germs usually react to the test by developing swelling at the injection site. The skin test results must be read 48 to 72 hours after given. Failure to return within this time frame means the test will need to be repeated.    Side effects…  Side effects from a skin test are rare and may include itching and discomfort at the injection site and very rarely the possibility of a blister, ulceration or necrosis (dead tissue) at the site if the injection.  
Internal Medicine
Nephrology
Nephrology
Internal Medicine

## 2019-04-28 NOTE — PROGRESS NOTE ADULT - ASSESSMENT
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

## 2019-04-28 NOTE — PROGRESS NOTE ADULT - SUBJECTIVE AND OBJECTIVE BOX
Patient is a 89y old  Female who presents with a chief complaint of Hyponatremia (27 Apr 2019 08:58)        SUBJECTIVE / OVERNIGHT EVENTS:  No events overnight, d/c planning today.     MEDICATIONS  (STANDING):  amLODIPine   Tablet 10 milliGRAM(s) Oral daily  aspirin  chewable 81 milliGRAM(s) Oral daily  atorvastatin 10 milliGRAM(s) Oral at bedtime  carvedilol 3.125 milliGRAM(s) Oral every 12 hours  dextrose 5%. 1000 milliLiter(s) (50 mL/Hr) IV Continuous <Continuous>  dextrose 50% Injectable 12.5 Gram(s) IV Push once  dextrose 50% Injectable 25 Gram(s) IV Push once  dextrose 50% Injectable 25 Gram(s) IV Push once  escitalopram 5 milliGRAM(s) Oral daily  famotidine    Tablet 20 milliGRAM(s) Oral daily  heparin  Injectable 5000 Unit(s) SubCutaneous every 8 hours  insulin glargine Injectable (LANTUS) 5 Unit(s) SubCutaneous at bedtime  insulin lispro (HumaLOG) corrective regimen sliding scale   SubCutaneous three times a day before meals  insulin lispro (HumaLOG) corrective regimen sliding scale   SubCutaneous at bedtime  levothyroxine 125 MICROGram(s) Oral daily  losartan 50 milliGRAM(s) Oral daily  montelukast 10 milliGRAM(s) Oral daily  sodium chloride 1 Gram(s) Oral two times a day    MEDICATIONS  (PRN):  ALBUTerol/ipratropium for Nebulization 3 milliLiter(s) Nebulizer every 6 hours PRN Shortness of Breath and/or Wheezing  dextrose 40% Gel 15 Gram(s) Oral once PRN Blood Glucose LESS THAN 70 milliGRAM(s)/deciliter  glucagon  Injectable 1 milliGRAM(s) IntraMuscular once PRN Glucose LESS THAN 70 milligrams/deciliter      T(C): 36.7 (04-28-19 @ 04:11), Max: 36.8 (04-27-19 @ 14:05)  HR: 64 (04-28-19 @ 04:11) (60 - 64)  BP: 149/74 (04-28-19 @ 04:11) (133/72 - 160/66)  RR: 18 (04-28-19 @ 04:11) (18 - 18)  SpO2: 99% (04-28-19 @ 04:11) (98% - 99%)  CAPILLARY BLOOD GLUCOSE      POCT Blood Glucose.: 111 mg/dL (28 Apr 2019 08:37)  POCT Blood Glucose.: 142 mg/dL (27 Apr 2019 21:58)  POCT Blood Glucose.: 111 mg/dL (27 Apr 2019 17:29)  POCT Blood Glucose.: 89 mg/dL (27 Apr 2019 12:40)    I&O's Summary    27 Apr 2019 07:01  -  28 Apr 2019 07:00  --------------------------------------------------------  IN: 180 mL / OUT: 0 mL / NET: 180 mL        PHYSICAL EXAM  GENERAL: NAD, well-developed  HEAD:  Atraumatic, Normocephalic  EYES: EOMI, PERRLA, conjunctiva and sclera clear  NECK: Supple, No JVD  CHEST/LUNG: Clear to auscultation bilaterally; No wheeze  HEART: Regular rate and rhythm; No murmurs, rubs, or gallops  ABDOMEN: Soft, Nontender, Nondistended; Bowel sounds present  EXTREMITIES:  2+ Peripheral Pulses, No clubbing, cyanosis, or edema  PSYCH: AAOx3  SKIN: No rashes or lesions    LABS:    04-27    132<L>  |  94<L>  |  16  ----------------------------<  82  4.5   |  24  |  1.39<H>    Ca    9.6      27 Apr 2019 10:49  Phos  3.4     04-27  Mg     2.1     04-27                RADIOLOGY & ADDITIONAL TESTS:    Imaging Personally Reviewed:  Consultant(s) Notes Reviewed:    Care Discussed with Consultants/Other Providers: Patient is a 89y old  Female who presents with a chief complaint of Hyponatremia (27 Apr 2019 08:58)        SUBJECTIVE / OVERNIGHT EVENTS:  No events overnight, d/c planning today. Pt reports no chest pain, dyspnea, abdominal pain.     MEDICATIONS  (STANDING):  amLODIPine   Tablet 10 milliGRAM(s) Oral daily  aspirin  chewable 81 milliGRAM(s) Oral daily  atorvastatin 10 milliGRAM(s) Oral at bedtime  carvedilol 3.125 milliGRAM(s) Oral every 12 hours  dextrose 5%. 1000 milliLiter(s) (50 mL/Hr) IV Continuous <Continuous>  dextrose 50% Injectable 12.5 Gram(s) IV Push once  dextrose 50% Injectable 25 Gram(s) IV Push once  dextrose 50% Injectable 25 Gram(s) IV Push once  escitalopram 5 milliGRAM(s) Oral daily  famotidine    Tablet 20 milliGRAM(s) Oral daily  heparin  Injectable 5000 Unit(s) SubCutaneous every 8 hours  insulin glargine Injectable (LANTUS) 5 Unit(s) SubCutaneous at bedtime  insulin lispro (HumaLOG) corrective regimen sliding scale   SubCutaneous three times a day before meals  insulin lispro (HumaLOG) corrective regimen sliding scale   SubCutaneous at bedtime  levothyroxine 125 MICROGram(s) Oral daily  losartan 50 milliGRAM(s) Oral daily  montelukast 10 milliGRAM(s) Oral daily  sodium chloride 1 Gram(s) Oral two times a day    MEDICATIONS  (PRN):  ALBUTerol/ipratropium for Nebulization 3 milliLiter(s) Nebulizer every 6 hours PRN Shortness of Breath and/or Wheezing  dextrose 40% Gel 15 Gram(s) Oral once PRN Blood Glucose LESS THAN 70 milliGRAM(s)/deciliter  glucagon  Injectable 1 milliGRAM(s) IntraMuscular once PRN Glucose LESS THAN 70 milligrams/deciliter      T(C): 36.7 (04-28-19 @ 04:11), Max: 36.8 (04-27-19 @ 14:05)  HR: 64 (04-28-19 @ 04:11) (60 - 64)  BP: 149/74 (04-28-19 @ 04:11) (133/72 - 160/66)  RR: 18 (04-28-19 @ 04:11) (18 - 18)  SpO2: 99% (04-28-19 @ 04:11) (98% - 99%)  CAPILLARY BLOOD GLUCOSE      POCT Blood Glucose.: 111 mg/dL (28 Apr 2019 08:37)  POCT Blood Glucose.: 142 mg/dL (27 Apr 2019 21:58)  POCT Blood Glucose.: 111 mg/dL (27 Apr 2019 17:29)  POCT Blood Glucose.: 89 mg/dL (27 Apr 2019 12:40)    I&O's Summary    27 Apr 2019 07:01  -  28 Apr 2019 07:00  --------------------------------------------------------  IN: 180 mL / OUT: 0 mL / NET: 180 mL        PHYSICAL EXAM  GENERAL: NAD, well-developed  HEAD:  Atraumatic, Normocephalic  EYES: EOMI, PERRLA, conjunctiva and sclera clear  NECK: Supple, No JVD  CHEST/LUNG: Clear to auscultation bilaterally; No wheeze  HEART: Regular rate and rhythm; No murmurs, rubs, or gallops  ABDOMEN: Soft, Nontender, Nondistended; Bowel sounds present  EXTREMITIES:  2+ Peripheral Pulses, No clubbing, cyanosis, or edema  PSYCH: AAOx2  SKIN: No rashes or lesions    LABS:    04-27    132<L>  |  94<L>  |  16  ----------------------------<  82  4.5   |  24  |  1.39<H>    Ca    9.6      27 Apr 2019 10:49  Phos  3.4     04-27  Mg     2.1     04-27                RADIOLOGY & ADDITIONAL TESTS:    Imaging Personally Reviewed:  Consultant(s) Notes Reviewed:  Renal   Care Discussed with Consultants/Other Providers: Renal

## 2019-04-29 ENCOUNTER — MEDICATION RENEWAL (OUTPATIENT)
Age: 84
End: 2019-04-29

## 2019-04-30 ENCOUNTER — APPOINTMENT (OUTPATIENT)
Dept: HOME HEALTH SERVICES | Facility: HOME HEALTH | Age: 84
End: 2019-04-30

## 2019-04-30 VITALS
SYSTOLIC BLOOD PRESSURE: 112 MMHG | DIASTOLIC BLOOD PRESSURE: 62 MMHG | OXYGEN SATURATION: 98 % | RESPIRATION RATE: 16 BRPM | HEART RATE: 72 BPM | TEMPERATURE: 98.2 F

## 2019-05-02 ENCOUNTER — CLINICAL ADVICE (OUTPATIENT)
Age: 84
End: 2019-05-02

## 2019-05-07 LAB
ALBUMIN SERPL ELPH-MCNC: 3 G/DL
ALP BLD-CCNC: 70 U/L
ALT SERPL-CCNC: 13 U/L
ANION GAP SERPL CALC-SCNC: 16 MMOL/L
AST SERPL-CCNC: 20 U/L
BASOPHILS # BLD AUTO: 0.06 K/UL
BASOPHILS NFR BLD AUTO: 0.5 %
BILIRUB SERPL-MCNC: 0.4 MG/DL
BUN SERPL-MCNC: 17 MG/DL
CALCIUM SERPL-MCNC: 9.2 MG/DL
CHLORIDE SERPL-SCNC: 96 MMOL/L
CO2 SERPL-SCNC: 22 MMOL/L
CREAT SERPL-MCNC: 1.31 MG/DL
EOSINOPHIL # BLD AUTO: 0.25 K/UL
EOSINOPHIL NFR BLD AUTO: 2.1 %
GLUCOSE SERPL-MCNC: 151 MG/DL
HCT VFR BLD CALC: 33.3 %
HGB BLD-MCNC: 10.3 G/DL
IMM GRANULOCYTES NFR BLD AUTO: 0.3 %
LYMPHOCYTES # BLD AUTO: 3.01 K/UL
LYMPHOCYTES NFR BLD AUTO: 25.8 %
MAN DIFF?: NORMAL
MCHC RBC-ENTMCNC: 26.3 PG
MCHC RBC-ENTMCNC: 30.9 GM/DL
MCV RBC AUTO: 85.2 FL
MONOCYTES # BLD AUTO: 0.75 K/UL
MONOCYTES NFR BLD AUTO: 6.4 %
NEUTROPHILS # BLD AUTO: 7.55 K/UL
NEUTROPHILS NFR BLD AUTO: 64.9 %
PLATELET # BLD AUTO: 239 K/UL
POTASSIUM SERPL-SCNC: 4.3 MMOL/L
PROT SERPL-MCNC: 5 G/DL
RBC # BLD: 3.91 M/UL
RBC # FLD: 16.6 %
SODIUM SERPL-SCNC: 134 MMOL/L
WBC # FLD AUTO: 11.65 K/UL

## 2019-05-16 ENCOUNTER — CLINICAL ADVICE (OUTPATIENT)
Age: 84
End: 2019-05-16

## 2019-05-20 ENCOUNTER — OTHER (OUTPATIENT)
Age: 84
End: 2019-05-20

## 2019-05-21 ENCOUNTER — OTHER (OUTPATIENT)
Age: 84
End: 2019-05-21

## 2019-05-23 LAB
ALBUMIN SERPL ELPH-MCNC: 3 G/DL
ANION GAP SERPL CALC-SCNC: 12 MMOL/L
BUN SERPL-MCNC: 10 MG/DL
CALCIUM SERPL-MCNC: 9.3 MG/DL
CHLORIDE SERPL-SCNC: 98 MMOL/L
CO2 SERPL-SCNC: 23 MMOL/L
CREAT SERPL-MCNC: 1.35 MG/DL
GLUCOSE SERPL-MCNC: 73 MG/DL
OSMOLALITY SERPL: 271 MOSM/KG
PHOSPHATE SERPL-MCNC: 3.2 MG/DL
POTASSIUM SERPL-SCNC: 4.3 MMOL/L
PREALB SERPL NEPH-MCNC: 14 MG/DL
SODIUM SERPL-SCNC: 133 MMOL/L

## 2019-05-26 ENCOUNTER — INPATIENT (INPATIENT)
Facility: HOSPITAL | Age: 84
LOS: 2 days | Discharge: HOME | End: 2019-05-29
Attending: INTERNAL MEDICINE | Admitting: INTERNAL MEDICINE
Payer: COMMERCIAL

## 2019-05-26 VITALS
DIASTOLIC BLOOD PRESSURE: 55 MMHG | SYSTOLIC BLOOD PRESSURE: 130 MMHG | TEMPERATURE: 98 F | HEIGHT: 68 IN | RESPIRATION RATE: 14 BRPM | WEIGHT: 199.96 LBS | HEART RATE: 61 BPM | OXYGEN SATURATION: 98 %

## 2019-05-26 LAB
ALBUMIN SERPL ELPH-MCNC: 2.5 G/DL — LOW (ref 3.3–5)
ALP SERPL-CCNC: 87 U/L — SIGNIFICANT CHANGE UP (ref 40–120)
ALT FLD-CCNC: 20 U/L — SIGNIFICANT CHANGE UP (ref 12–78)
ANION GAP SERPL CALC-SCNC: 11 MMOL/L — SIGNIFICANT CHANGE UP (ref 5–17)
APPEARANCE UR: ABNORMAL
APTT BLD: 24.4 SEC — LOW (ref 28.5–37)
AST SERPL-CCNC: 30 U/L — SIGNIFICANT CHANGE UP (ref 15–37)
BACTERIA # UR AUTO: ABNORMAL
BASOPHILS # BLD AUTO: 0.03 K/UL — SIGNIFICANT CHANGE UP (ref 0–0.2)
BASOPHILS NFR BLD AUTO: 0.2 % — SIGNIFICANT CHANGE UP (ref 0–2)
BILIRUB SERPL-MCNC: 0.4 MG/DL — SIGNIFICANT CHANGE UP (ref 0.2–1.2)
BILIRUB UR-MCNC: NEGATIVE — SIGNIFICANT CHANGE UP
BUN SERPL-MCNC: 23 MG/DL — SIGNIFICANT CHANGE UP (ref 7–23)
CALCIUM SERPL-MCNC: 8.6 MG/DL — SIGNIFICANT CHANGE UP (ref 8.5–10.1)
CHLORIDE SERPL-SCNC: 101 MMOL/L — SIGNIFICANT CHANGE UP (ref 96–108)
CO2 SERPL-SCNC: 24 MMOL/L — SIGNIFICANT CHANGE UP (ref 22–31)
COLOR SPEC: YELLOW — SIGNIFICANT CHANGE UP
CREAT SERPL-MCNC: 1.52 MG/DL — HIGH (ref 0.5–1.3)
DIFF PNL FLD: ABNORMAL
EOSINOPHIL # BLD AUTO: 0.11 K/UL — SIGNIFICANT CHANGE UP (ref 0–0.5)
EOSINOPHIL NFR BLD AUTO: 0.8 % — SIGNIFICANT CHANGE UP (ref 0–6)
EPI CELLS # UR: SIGNIFICANT CHANGE UP
GLUCOSE BLDC GLUCOMTR-MCNC: 102 MG/DL — HIGH (ref 70–99)
GLUCOSE BLDC GLUCOMTR-MCNC: 109 MG/DL — HIGH (ref 70–99)
GLUCOSE BLDC GLUCOMTR-MCNC: 181 MG/DL — HIGH (ref 70–99)
GLUCOSE BLDC GLUCOMTR-MCNC: 61 MG/DL — LOW (ref 70–99)
GLUCOSE BLDC GLUCOMTR-MCNC: 89 MG/DL — SIGNIFICANT CHANGE UP (ref 70–99)
GLUCOSE SERPL-MCNC: 45 MG/DL — CRITICAL LOW (ref 70–99)
GLUCOSE UR QL: NEGATIVE MG/DL — SIGNIFICANT CHANGE UP
HCT VFR BLD CALC: 31.4 % — LOW (ref 34.5–45)
HGB BLD-MCNC: 10.3 G/DL — LOW (ref 11.5–15.5)
IMM GRANULOCYTES NFR BLD AUTO: 0.4 % — SIGNIFICANT CHANGE UP (ref 0–1.5)
INR BLD: 1.03 RATIO — SIGNIFICANT CHANGE UP (ref 0.88–1.16)
KETONES UR-MCNC: NEGATIVE — SIGNIFICANT CHANGE UP
LACTATE SERPL-SCNC: 2 MMOL/L — SIGNIFICANT CHANGE UP (ref 0.7–2)
LEUKOCYTE ESTERASE UR-ACNC: ABNORMAL
LYMPHOCYTES # BLD AUTO: 28.5 % — SIGNIFICANT CHANGE UP (ref 13–44)
LYMPHOCYTES # BLD AUTO: 3.94 K/UL — HIGH (ref 1–3.3)
MCHC RBC-ENTMCNC: 26.8 PG — LOW (ref 27–34)
MCHC RBC-ENTMCNC: 32.8 GM/DL — SIGNIFICANT CHANGE UP (ref 32–36)
MCV RBC AUTO: 81.6 FL — SIGNIFICANT CHANGE UP (ref 80–100)
MONOCYTES # BLD AUTO: 1.48 K/UL — HIGH (ref 0–0.9)
MONOCYTES NFR BLD AUTO: 10.7 % — SIGNIFICANT CHANGE UP (ref 2–14)
NEUTROPHILS # BLD AUTO: 8.22 K/UL — HIGH (ref 1.8–7.4)
NEUTROPHILS NFR BLD AUTO: 59.4 % — SIGNIFICANT CHANGE UP (ref 43–77)
NITRITE UR-MCNC: NEGATIVE — SIGNIFICANT CHANGE UP
NRBC # BLD: 0 /100 WBCS — SIGNIFICANT CHANGE UP (ref 0–0)
PH UR: 5 — SIGNIFICANT CHANGE UP (ref 5–8)
PLATELET # BLD AUTO: 255 K/UL — SIGNIFICANT CHANGE UP (ref 150–400)
POTASSIUM SERPL-MCNC: 4 MMOL/L — SIGNIFICANT CHANGE UP (ref 3.5–5.3)
POTASSIUM SERPL-SCNC: 4 MMOL/L — SIGNIFICANT CHANGE UP (ref 3.5–5.3)
PROT SERPL-MCNC: 5.5 GM/DL — LOW (ref 6–8.3)
PROT UR-MCNC: 100 MG/DL
PROTHROM AB SERPL-ACNC: 11.6 SEC — SIGNIFICANT CHANGE UP (ref 10–12.9)
RBC # BLD: 3.85 M/UL — SIGNIFICANT CHANGE UP (ref 3.8–5.2)
RBC # FLD: 15.8 % — HIGH (ref 10.3–14.5)
RBC CASTS # UR COMP ASSIST: ABNORMAL /HPF (ref 0–4)
SODIUM SERPL-SCNC: 136 MMOL/L — SIGNIFICANT CHANGE UP (ref 135–145)
SP GR SPEC: 1.01 — SIGNIFICANT CHANGE UP (ref 1.01–1.02)
TROPONIN I SERPL-MCNC: 0.05 NG/ML — HIGH (ref 0.01–0.04)
UROBILINOGEN FLD QL: NEGATIVE MG/DL — SIGNIFICANT CHANGE UP
WBC # BLD: 13.84 K/UL — HIGH (ref 3.8–10.5)
WBC # FLD AUTO: 13.84 K/UL — HIGH (ref 3.8–10.5)
WBC UR QL: >50

## 2019-05-26 PROCEDURE — 99285 EMERGENCY DEPT VISIT HI MDM: CPT

## 2019-05-26 PROCEDURE — 93010 ELECTROCARDIOGRAM REPORT: CPT

## 2019-05-26 PROCEDURE — 70450 CT HEAD/BRAIN W/O DYE: CPT | Mod: 26

## 2019-05-26 PROCEDURE — 71045 X-RAY EXAM CHEST 1 VIEW: CPT | Mod: 26

## 2019-05-26 PROCEDURE — 99223 1ST HOSP IP/OBS HIGH 75: CPT | Mod: AI

## 2019-05-26 RX ORDER — SODIUM CHLORIDE 9 MG/ML
1000 INJECTION, SOLUTION INTRAVENOUS
Refills: 0 | Status: COMPLETED | OUTPATIENT
Start: 2019-05-26 | End: 2019-05-26

## 2019-05-26 RX ORDER — CEFTRIAXONE 500 MG/1
1 INJECTION, POWDER, FOR SOLUTION INTRAMUSCULAR; INTRAVENOUS ONCE
Refills: 0 | Status: COMPLETED | OUTPATIENT
Start: 2019-05-26 | End: 2019-05-26

## 2019-05-26 RX ORDER — DEXTROSE 50 % IN WATER 50 %
50 SYRINGE (ML) INTRAVENOUS ONCE
Refills: 0 | Status: COMPLETED | OUTPATIENT
Start: 2019-05-26 | End: 2019-05-26

## 2019-05-26 RX ORDER — AMLODIPINE BESYLATE 2.5 MG/1
1 TABLET ORAL
Qty: 0 | Refills: 0 | DISCHARGE

## 2019-05-26 RX ORDER — ASPIRIN/CALCIUM CARB/MAGNESIUM 324 MG
1 TABLET ORAL
Qty: 0 | Refills: 0 | DISCHARGE

## 2019-05-26 RX ORDER — DEXTROSE 50 % IN WATER 50 %
50 SYRINGE (ML) INTRAVENOUS ONCE
Refills: 0 | Status: DISCONTINUED | OUTPATIENT
Start: 2019-05-26 | End: 2019-05-26

## 2019-05-26 RX ORDER — SITAGLIPTIN 50 MG/1
1 TABLET, FILM COATED ORAL
Qty: 0 | Refills: 0 | DISCHARGE

## 2019-05-26 RX ORDER — INSULIN DETEMIR 100/ML (3)
6 INSULIN PEN (ML) SUBCUTANEOUS
Qty: 0 | Refills: 0 | DISCHARGE

## 2019-05-26 RX ORDER — FAMOTIDINE 10 MG/ML
1 INJECTION INTRAVENOUS
Qty: 0 | Refills: 0 | DISCHARGE

## 2019-05-26 RX ORDER — LOSARTAN POTASSIUM 100 MG/1
1 TABLET, FILM COATED ORAL
Qty: 0 | Refills: 0 | DISCHARGE

## 2019-05-26 RX ADMIN — Medication 50 MILLILITER(S): at 20:20

## 2019-05-26 RX ADMIN — SODIUM CHLORIDE 125 MILLILITER(S): 9 INJECTION, SOLUTION INTRAVENOUS at 21:58

## 2019-05-26 RX ADMIN — CEFTRIAXONE 100 GRAM(S): 500 INJECTION, POWDER, FOR SOLUTION INTRAMUSCULAR; INTRAVENOUS at 23:12

## 2019-05-26 NOTE — ED PROVIDER NOTE - CLINICAL SUMMARY MEDICAL DECISION MAKING FREE TEXT BOX
Patient with altered mental status, hypoglycemic in field.  VSS.  CT head negative.  Labs, UA findings reviewed.  Patient receiving D5 drip and abx for UTI.  Patient is to be admitted to the hospital and the case was discussed with the admitting physician.  Any changes in plan, additional imaging/labs, and further work up will be at the discretion of the admitting physician.

## 2019-05-26 NOTE — ED PROVIDER NOTE - CARE PLAN
Principal Discharge DX:	Altered mental status  Secondary Diagnosis:	UTI (urinary tract infection)  Secondary Diagnosis:	Hypoglycemia

## 2019-05-26 NOTE — ED PROVIDER NOTE - PHYSICAL EXAMINATION
Gen: Keeps eyes closed, unresponsive  Head: NC, AT, PERRL, EOMI, normal lids/conjunctiva  ENT: normal hearing, patent oropharynx without erythema/exudate, uvula midline  Neck:  no tenderness/meningismus/JVD, +Trachea midline  Pulm: Bilateral BS, normal resp effort, no wheeze/stridor/retractions  CV: irregular, no M/R/G, +dist pulses  Abd: soft, NT/ND, +BS, no palpable masses  Mskel: no edema/erythema/cyanosis  Skin: no rash, warm/dry  Neuro: Moving all extremities, not following commands

## 2019-05-26 NOTE — ED ADULT NURSE NOTE - OBJECTIVE STATEMENT
pt presents to the ED with hypoglycemia, Pt was found unresponsive and eyes rolling back to head, sweating, FS-39, gave sugar and honey orally, then retook FS-46, and pt. open eyes.  EMS accucheck -54, given glucagon by EMS. At [facility], FS-61, patient given 50% dextrose. Per pt's granddaughter, pt. has been eating normally. Pt's granddaughter also states "last night pt. was not given insulin,because ." Pt is nonverbal, eyes open spontaneously to verbal command rn.

## 2019-05-26 NOTE — ED ADULT TRIAGE NOTE - CHIEF COMPLAINT QUOTE
pt called for unresponsive. blood sugar low, given sugar and honey by family. EMS accucheck 54. given glucagon by EMS. current accucheck 61.

## 2019-05-26 NOTE — ED PROVIDER NOTE - OBJECTIVE STATEMENT
Pertinent PMH/PSH/FHx/SHx and Review of Systems contained within:  Patient presents to the ED for altered mental status.  Patient is unresponsive, not following commands but maintaining airway.  Granddaughters present at bedside, one is a nurse.  Patient had been weak since yesterday, today unresponsive, blood glucose 39 at home, patient given oral honey, sugars were 51 when EMS came.  BP was measured at home and normal.  Patient takes glipizide and levemir insulin.  Patient given glucagon by EMS.  Per family, no recent illness or change in medication.      Relevant PMHx/SHx/SOCHx/FAMH:  HTN, dementia, DM, hypothyroidism, HLD, atrial fib  Patient family denies EtOH/tobacco/illicit substance use. Pertinent PMH/PSH/FHx/SHx and Review of Systems contained within:  Patient presents to the ED for altered mental status.  Patient is unresponsive, not following commands but maintaining airway.  Granddaughters present at bedside, one is a nurse.  Patient had been weak since yesterday, today unresponsive, blood glucose 39 at home, patient given oral honey, sugars were 51 when EMS came.  BP was measured at home and normal.  Patient takes glipizide and levemir insulin.  Patient given glucagon by EMS.  Per family, no recent change in medication.  Patient had been having diarrhea over the last week which resolved 3 days ago.     Relevant PMHx/SHx/SOCHx/FAMH:  HTN, dementia, DM, hypothyroidism, HLD, atrial fib  Patient family denies EtOH/tobacco/illicit substance use.

## 2019-05-26 NOTE — ED ADULT NURSE NOTE - NSIMPLEMENTINTERV_GEN_ALL_ED
Implemented All Fall with Harm Risk Interventions:  [place] to call system. Call bell, personal items and telephone within reach. Instruct patient to call for assistance. Room bathroom lighting operational. Non-slip footwear when patient is off stretcher. Physically safe environment: no spills, clutter or unnecessary equipment. Stretcher in lowest position, wheels locked, appropriate side rails in place. Provide visual cue, wrist band, yellow gown, etc. Monitor gait and stability. Monitor for mental status changes and reorient to person, place, and time. Review medications for side effects contributing to fall risk. Reinforce activity limits and safety measures with patient and family. Provide visual clues: red socks.

## 2019-05-27 DIAGNOSIS — R41.82 ALTERED MENTAL STATUS, UNSPECIFIED: ICD-10-CM

## 2019-05-27 DIAGNOSIS — N30.00 ACUTE CYSTITIS WITHOUT HEMATURIA: ICD-10-CM

## 2019-05-27 DIAGNOSIS — G93.41 METABOLIC ENCEPHALOPATHY: ICD-10-CM

## 2019-05-27 DIAGNOSIS — E16.2 HYPOGLYCEMIA, UNSPECIFIED: ICD-10-CM

## 2019-05-27 DIAGNOSIS — R53.2 FUNCTIONAL QUADRIPLEGIA: ICD-10-CM

## 2019-05-27 LAB
CULTURE RESULTS: SIGNIFICANT CHANGE UP
GLUCOSE BLDC GLUCOMTR-MCNC: 106 MG/DL — HIGH (ref 70–99)
GLUCOSE BLDC GLUCOMTR-MCNC: 134 MG/DL — HIGH (ref 70–99)
GLUCOSE BLDC GLUCOMTR-MCNC: 159 MG/DL — HIGH (ref 70–99)
GLUCOSE BLDC GLUCOMTR-MCNC: 69 MG/DL — LOW (ref 70–99)
GLUCOSE BLDC GLUCOMTR-MCNC: 78 MG/DL — SIGNIFICANT CHANGE UP (ref 70–99)
GLUCOSE BLDC GLUCOMTR-MCNC: 80 MG/DL — SIGNIFICANT CHANGE UP (ref 70–99)
GLUCOSE BLDC GLUCOMTR-MCNC: 87 MG/DL — SIGNIFICANT CHANGE UP (ref 70–99)
GLUCOSE BLDC GLUCOMTR-MCNC: 92 MG/DL — SIGNIFICANT CHANGE UP (ref 70–99)
SPECIMEN SOURCE: SIGNIFICANT CHANGE UP

## 2019-05-27 PROCEDURE — 99233 SBSQ HOSP IP/OBS HIGH 50: CPT

## 2019-05-27 PROCEDURE — 93971 EXTREMITY STUDY: CPT | Mod: 26,LT

## 2019-05-27 RX ORDER — ASPIRIN/CALCIUM CARB/MAGNESIUM 324 MG
81 TABLET ORAL DAILY
Refills: 0 | Status: DISCONTINUED | OUTPATIENT
Start: 2019-05-27 | End: 2019-05-29

## 2019-05-27 RX ORDER — DEXTROSE 50 % IN WATER 50 %
12.5 SYRINGE (ML) INTRAVENOUS ONCE
Refills: 0 | Status: DISCONTINUED | OUTPATIENT
Start: 2019-05-27 | End: 2019-05-29

## 2019-05-27 RX ORDER — DEXTROSE 50 % IN WATER 50 %
25 SYRINGE (ML) INTRAVENOUS ONCE
Refills: 0 | Status: DISCONTINUED | OUTPATIENT
Start: 2019-05-27 | End: 2019-05-29

## 2019-05-27 RX ORDER — AMLODIPINE BESYLATE 2.5 MG/1
10 TABLET ORAL DAILY
Refills: 0 | Status: DISCONTINUED | OUTPATIENT
Start: 2019-05-27 | End: 2019-05-29

## 2019-05-27 RX ORDER — CARVEDILOL PHOSPHATE 80 MG/1
3.12 CAPSULE, EXTENDED RELEASE ORAL EVERY 12 HOURS
Refills: 0 | Status: DISCONTINUED | OUTPATIENT
Start: 2019-05-27 | End: 2019-05-28

## 2019-05-27 RX ORDER — GLUCAGON INJECTION, SOLUTION 0.5 MG/.1ML
1 INJECTION, SOLUTION SUBCUTANEOUS ONCE
Refills: 0 | Status: DISCONTINUED | OUTPATIENT
Start: 2019-05-27 | End: 2019-05-29

## 2019-05-27 RX ORDER — HEPARIN SODIUM 5000 [USP'U]/ML
5000 INJECTION INTRAVENOUS; SUBCUTANEOUS EVERY 12 HOURS
Refills: 0 | Status: DISCONTINUED | OUTPATIENT
Start: 2019-05-27 | End: 2019-05-29

## 2019-05-27 RX ORDER — SODIUM CHLORIDE 9 MG/ML
1000 INJECTION, SOLUTION INTRAVENOUS
Refills: 0 | Status: DISCONTINUED | OUTPATIENT
Start: 2019-05-27 | End: 2019-05-29

## 2019-05-27 RX ORDER — INSULIN LISPRO 100/ML
VIAL (ML) SUBCUTANEOUS
Refills: 0 | Status: DISCONTINUED | OUTPATIENT
Start: 2019-05-27 | End: 2019-05-29

## 2019-05-27 RX ORDER — INSULIN LISPRO 100/ML
VIAL (ML) SUBCUTANEOUS AT BEDTIME
Refills: 0 | Status: DISCONTINUED | OUTPATIENT
Start: 2019-05-27 | End: 2019-05-29

## 2019-05-27 RX ORDER — FAMOTIDINE 10 MG/ML
20 INJECTION INTRAVENOUS DAILY
Refills: 0 | Status: DISCONTINUED | OUTPATIENT
Start: 2019-05-27 | End: 2019-05-29

## 2019-05-27 RX ORDER — MONTELUKAST 4 MG/1
10 TABLET, CHEWABLE ORAL DAILY
Refills: 0 | Status: DISCONTINUED | OUTPATIENT
Start: 2019-05-27 | End: 2019-05-29

## 2019-05-27 RX ORDER — ATORVASTATIN CALCIUM 80 MG/1
10 TABLET, FILM COATED ORAL AT BEDTIME
Refills: 0 | Status: DISCONTINUED | OUTPATIENT
Start: 2019-05-27 | End: 2019-05-29

## 2019-05-27 RX ORDER — DEXTROSE 50 % IN WATER 50 %
15 SYRINGE (ML) INTRAVENOUS ONCE
Refills: 0 | Status: DISCONTINUED | OUTPATIENT
Start: 2019-05-27 | End: 2019-05-29

## 2019-05-27 RX ORDER — LOSARTAN POTASSIUM 100 MG/1
50 TABLET, FILM COATED ORAL DAILY
Refills: 0 | Status: DISCONTINUED | OUTPATIENT
Start: 2019-05-27 | End: 2019-05-29

## 2019-05-27 RX ORDER — LEVOTHYROXINE SODIUM 125 MCG
125 TABLET ORAL DAILY
Refills: 0 | Status: DISCONTINUED | OUTPATIENT
Start: 2019-05-27 | End: 2019-05-29

## 2019-05-27 RX ORDER — CEFTRIAXONE 500 MG/1
1 INJECTION, POWDER, FOR SOLUTION INTRAMUSCULAR; INTRAVENOUS EVERY 24 HOURS
Refills: 0 | Status: DISCONTINUED | OUTPATIENT
Start: 2019-05-27 | End: 2019-05-29

## 2019-05-27 RX ADMIN — MONTELUKAST 10 MILLIGRAM(S): 4 TABLET, CHEWABLE ORAL at 21:50

## 2019-05-27 RX ADMIN — HEPARIN SODIUM 5000 UNIT(S): 5000 INJECTION INTRAVENOUS; SUBCUTANEOUS at 18:14

## 2019-05-27 RX ADMIN — CEFTRIAXONE 100 GRAM(S): 500 INJECTION, POWDER, FOR SOLUTION INTRAMUSCULAR; INTRAVENOUS at 21:50

## 2019-05-27 RX ADMIN — CARVEDILOL PHOSPHATE 3.12 MILLIGRAM(S): 80 CAPSULE, EXTENDED RELEASE ORAL at 18:15

## 2019-05-27 RX ADMIN — ATORVASTATIN CALCIUM 10 MILLIGRAM(S): 80 TABLET, FILM COATED ORAL at 21:50

## 2019-05-27 NOTE — H&P ADULT - NSHPLABSRESULTS_GEN_ALL_CORE
LABS:                        10.3   13.84 )-----------( 255      ( 26 May 2019 21:06 )             31.4         136  |  101  |  23  ----------------------------<  45<LL>  4.0   |  24  |  1.52<H>    Ca    8.6      26 May 2019 21:06    TPro  5.5<L>  /  Alb  2.5<L>  /  TBili  0.4  /  DBili  x   /  AST  30  /  ALT  20  /  AlkPhos  87      PT/INR - ( 26 May 2019 21:06 )   PT: 11.6 sec;   INR: 1.03 ratio         PTT - ( 26 May 2019 21:06 )  PTT:24.4 sec  Urinalysis Basic - ( 26 May 2019 21:06 )    Color: Yellow / Appearance: Slightly Turbid / S.010 / pH: x  Gluc: x / Ketone: Negative  / Bili: Negative / Urobili: Negative mg/dL   Blood: x / Protein: 100 mg/dL / Nitrite: Negative   Leuk Esterase: Moderate / RBC: 3-5 /HPF / WBC >50   Sq Epi: x / Non Sq Epi: Occasional / Bacteria: Many      CAPILLARY BLOOD GLUCOSE      POCT Blood Glucose.: 80 mg/dL (27 May 2019 02:54)  POCT Blood Glucose.: 87 mg/dL (27 May 2019 01:32)  POCT Blood Glucose.: 92 mg/dL (27 May 2019 00:45)  POCT Blood Glucose.: 89 mg/dL (26 May 2019 23:47)  POCT Blood Glucose.: 102 mg/dL (26 May 2019 22:36)  POCT Blood Glucose.: 109 mg/dL (26 May 2019 21:13)  POCT Blood Glucose.: 181 mg/dL (26 May 2019 20:14)  POCT Blood Glucose.: 61 mg/dL (26 May 2019 19:58)      RADIOLOGY & ADDITIONAL TESTS:    Imaging Personally Reviewed:  [ X] YES  [ ] NO

## 2019-05-27 NOTE — H&P ADULT - NSHPSOCIALHISTORY_GEN_ALL_CORE
no smoking, EtOH, illicit drug use  primarily bedbound, lives at home with 24HHA   has three children 2 daughters and 1 son (son who is a physician is HCP)

## 2019-05-27 NOTE — H&P ADULT - PROBLEM SELECTOR PLAN 8
hypoglycemia protocol  will resume ss insulin coverage as levemir wears off and pt able to take po. will change to regular diet for now to encourage po intake. cont out pt med

## 2019-05-27 NOTE — H&P ADULT - NSHPPHYSICALEXAM_GEN_ALL_CORE
Vital Signs Last 24 Hrs  T(C): 36.9 (27 May 2019 05:02), Max: 36.9 (26 May 2019 19:57)  T(F): 98.5 (27 May 2019 05:02), Max: 98.5 (27 May 2019 05:02)  HR: 65 (27 May 2019 05:02) (61 - 103)  BP: 114/44 (27 May 2019 05:02) (105/31 - 142/51)  BP(mean): --  RR: 20 (27 May 2019 05:02) (14 - 20)  SpO2: 100% (27 May 2019 05:02) (95% - 100%)    PHYSICAL EXAM:    GENERAL:frail elderlry female  HEAD:  Atraumatic, Normocephalic  EYES: EOMI, PERRLA, conjunctiva and sclera clear  ENMT: No tonsillar erythema, exudates, or enlargement; Moist mucous membranes, No lesions  NECK: Supple, No JVD, Normal thyroid  NERVOUS SYSTEM:  Alert & Oriented X1, unable to cooperate but able to move limbs  CHEST/LUNG: Clear to percussion bilaterally; No rales, rhonchi, wheezing, or rubs  HEART: irregular; No rubs, or gallops, +S1,S2  ABDOMEN: Soft, Nontender, Nondistended; Bowel sounds present  EXTREMITIES:  2+ Peripheral Pulses, No clubbing, cyanosis, or edema  LYMPH: No cervical adenopathy  RECTAL: deferred  BREAST: deferred  : deferred  SKIN: No rashes or lesions    IMPROVE VTE Individual Risk Assessment          RISK                                                          Points  [  ] Previous VTE                                                3  [  ] Thrombophilia                                             2  [  ] Lower limb paralysis                                    2        (unable to hold up >15 seconds)    [  ] Current Cancer                                             2         (within 6 months)  [ x ] Immobilization > 24 hrs                              1  [  ] ICU/CCU stay > 24 hours                            1  [ x ] Age > 60                                                    1  IMPROVE VTE Score ____2_____

## 2019-05-27 NOTE — PROGRESS NOTE ADULT - PROBLEM SELECTOR PLAN 1
admit to tele  cont d5 LR  hold insulin for now  frequent FS improving per family and at baseline if any change then would get MRI given h/o cva. mri screening form already  done if needed.

## 2019-05-27 NOTE — H&P ADULT - PROBLEM SELECTOR PLAN 9
sq heparin hypoglycemia protocol  will resume ss insulin coverage as levemir wears off and pt able to take po. will change to regular diet for now to encourage po intake.

## 2019-05-27 NOTE — H&P ADULT - ASSESSMENT
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

## 2019-05-27 NOTE — PROGRESS NOTE ADULT - SUBJECTIVE AND OBJECTIVE BOX
Patient is a 89y old  Female who presents with a chief complaint of hypoglycemi (27 May 2019 02:55)      OVERNIGHT EVENTS:      REVIEW OF SYSTEMS: denies chest pain/SOB, diaphoresis, no F/C, cough, dizziness, headache, blurry vision, nausea, vomiting, abdominal pain. Rest unremarkable     MEDICATIONS  (STANDING):  amLODIPine   Tablet 10 milliGRAM(s) Oral daily  aspirin enteric coated 81 milliGRAM(s) Oral daily  atorvastatin 10 milliGRAM(s) Oral at bedtime  carvedilol 3.125 milliGRAM(s) Oral every 12 hours  cefTRIAXone   IVPB 1 Gram(s) IV Intermittent every 24 hours  dextrose 5% + lactated ringers. 1000 milliLiter(s) (100 mL/Hr) IV Continuous <Continuous>  dextrose 5%. 1000 milliLiter(s) (50 mL/Hr) IV Continuous <Continuous>  dextrose 50% Injectable 12.5 Gram(s) IV Push once  dextrose 50% Injectable 25 Gram(s) IV Push once  dextrose 50% Injectable 25 Gram(s) IV Push once  famotidine    Tablet 20 milliGRAM(s) Oral daily  heparin  Injectable 5000 Unit(s) SubCutaneous every 12 hours  insulin lispro (HumaLOG) corrective regimen sliding scale   SubCutaneous three times a day before meals  insulin lispro (HumaLOG) corrective regimen sliding scale   SubCutaneous at bedtime  levothyroxine 125 MICROGram(s) Oral daily  losartan 50 milliGRAM(s) Oral daily  montelukast 10 milliGRAM(s) Oral daily    MEDICATIONS  (PRN):  dextrose 40% Gel 15 Gram(s) Oral once PRN Blood Glucose LESS THAN 70 milliGRAM(s)/deciliter  glucagon  Injectable 1 milliGRAM(s) IntraMuscular once PRN Glucose LESS THAN 70 milligrams/deciliter      Allergies    No Known Allergies    Intolerances        SUBJECTIVE: in bed in NAD, no acute events overnight     T(F): 98.5 (19 @ 05:02), Max: 98.5 (19 @ 05:02)  HR: 65 (19 @ 05:02) (61 - 103)  BP: 114/44 (19 @ 05:02) (105/31 - 142/51)  RR: 20 (19 @ 05:02) (14 - 20)  SpO2: 100% (19 @ 05:02) (95% - 100%)  Wt(kg): --    PHYSICAL EXAM:  GENERAL: NAD, well-groomed, well-developed  HEAD:  Atraumatic, Normocephalic  EYES: EOMI, PERRLA, conjunctiva and sclera clear  ENMT: No tonsillar erythema, exudates, or enlargement; Moist mucous membranes, Good dentition, No lesions  NECK: Supple, No JVD, Normal thyroid  CHEST/LUNG: Clear to  auscultation bilaterally; No rales, rhonchi, wheezing, or rubs  bilaterally  HEART: Regular rate and rhythm; No murmurs, rubs, or gallops  ABDOMEN: Soft, Nontender, Nondistended; Bowel sounds present  EXTREMITIES:  2+ Peripheral Pulses, No clubbing, cyanosis, or edema BL LE  SKIN: No rashes or lesions  NERVOUS SYSTEM:  demented, functional quad   DTRs 2+ intact and symmetric, sensation intact BL    LABS:                        10.3   13.84 )-----------( 255      ( 26 May 2019 21:06 )             31.4         136  |  101  |  23  ----------------------------<  45<LL>  4.0   |  24  |  1.52<H>    Ca    8.6      26 May 2019 21:06    TPro  5.5<L>  /  Alb  2.5<L>  /  TBili  0.4  /  DBili  x   /  AST  30  /  ALT  20  /  AlkPhos  87      PT/INR - ( 26 May 2019 21:06 )   PT: 11.6 sec;   INR: 1.03 ratio         PTT - ( 26 May 2019 21:06 )  PTT:24.4 sec  Urinalysis Basic - ( 26 May 2019 21:06 )    Color: Yellow / Appearance: Slightly Turbid / S.010 / pH: x  Gluc: x / Ketone: Negative  / Bili: Negative / Urobili: Negative mg/dL   Blood: x / Protein: 100 mg/dL / Nitrite: Negative   Leuk Esterase: Moderate / RBC: 3-5 /HPF / WBC >50   Sq Epi: x / Non Sq Epi: Occasional / Bacteria: Many      Cultures;   CAPILLARY BLOOD GLUCOSE      POCT Blood Glucose.: 78 mg/dL (27 May 2019 08:39)  POCT Blood Glucose.: 69 mg/dL (27 May 2019 07:36)  POCT Blood Glucose.: 80 mg/dL (27 May 2019 02:54)  POCT Blood Glucose.: 87 mg/dL (27 May 2019 01:32)  POCT Blood Glucose.: 92 mg/dL (27 May 2019 00:45)  POCT Blood Glucose.: 89 mg/dL (26 May 2019 23:47)  POCT Blood Glucose.: 102 mg/dL (26 May 2019 22:36)  POCT Blood Glucose.: 109 mg/dL (26 May 2019 21:13)  POCT Blood Glucose.: 181 mg/dL (26 May 2019 20:14)  POCT Blood Glucose.: 61 mg/dL (26 May 2019 19:58)    Lipid panel:     CARDIAC MARKERS ( 26 May 2019 21:06 )  .048 ng/mL / x     / x     / x     / x            RADIOLOGY & ADDITIONAL TESTS:      Imaging Personally Reviewed:  [ x] YES      Consultant(s) Notes Reviewed:  [x ] YES     Care Discussed with [x ] Consultants [X ] Patient [x ] Family  [x ]    [x ]  Other; RN Patient is a 89y old  Female who presents with a chief complaint of hypoglycemi (27 May 2019 02:55)      OVERNIGHT EVENTS:  MEDICATIONS  (STANDING):  amLODIPine   Tablet 10 milliGRAM(s) Oral daily  aspirin enteric coated 81 milliGRAM(s) Oral daily  atorvastatin 10 milliGRAM(s) Oral at bedtime  carvedilol 3.125 milliGRAM(s) Oral every 12 hours  cefTRIAXone   IVPB 1 Gram(s) IV Intermittent every 24 hours  dextrose 5% + lactated ringers. 1000 milliLiter(s) (100 mL/Hr) IV Continuous <Continuous>  dextrose 5%. 1000 milliLiter(s) (50 mL/Hr) IV Continuous <Continuous>  dextrose 50% Injectable 12.5 Gram(s) IV Push once  dextrose 50% Injectable 25 Gram(s) IV Push once  dextrose 50% Injectable 25 Gram(s) IV Push once  famotidine    Tablet 20 milliGRAM(s) Oral daily  heparin  Injectable 5000 Unit(s) SubCutaneous every 12 hours  insulin lispro (HumaLOG) corrective regimen sliding scale   SubCutaneous three times a day before meals  insulin lispro (HumaLOG) corrective regimen sliding scale   SubCutaneous at bedtime  levothyroxine 125 MICROGram(s) Oral daily  losartan 50 milliGRAM(s) Oral daily  montelukast 10 milliGRAM(s) Oral daily    MEDICATIONS  (PRN):  dextrose 40% Gel 15 Gram(s) Oral once PRN Blood Glucose LESS THAN 70 milliGRAM(s)/deciliter  glucagon  Injectable 1 milliGRAM(s) IntraMuscular once PRN Glucose LESS THAN 70 milligrams/deciliter      Allergies    No Known Allergies    Intolerances        SUBJECTIVE: in bed in NAD, no acute events overnight     T(F): 98.5 (19 @ 05:02), Max: 98.5 (19 @ 05:02)  HR: 65 (19 @ 05:02) (61 - 103)  BP: 114/44 (19 @ 05:02) (105/31 - 142/51)  RR: 20 (19 @ 05:02) (14 - 20)  SpO2: 100% (19 @ 05:02) (95% - 100%)  Wt(kg): --    PHYSICAL EXAM:  GENERAL: NAD, well-groomed, well-developed  HEAD:  Atraumatic, Normocephalic  EYES: EOMI, PERRLA, conjunctiva and sclera clear  ENMT: No tonsillar erythema, exudates, or enlargement; Moist mucous membranes, Good dentition, No lesions  NECK: Supple, No JVD, Normal thyroid  CHEST/LUNG: Clear to  auscultation bilaterally; No rales, rhonchi, wheezing, or rubs  bilaterally  HEART: Regular rate and rhythm; No murmurs, rubs, or gallops  ABDOMEN: Soft, Nontender, Nondistended; Bowel sounds present  EXTREMITIES:  2+ Peripheral Pulses, No clubbing, cyanosis, or edema BL LE  SKIN: No rashes or lesions  NERVOUS SYSTEM:  demented, functional quad   DTRs 2+ intact and symmetric, sensation intact BL    LABS:                        10.3   13.84 )-----------( 255      ( 26 May 2019 21:06 )             31.4         136  |  101  |  23  ----------------------------<  45<LL>  4.0   |  24  |  1.52<H>    Ca    8.6      26 May 2019 21:06    TPro  5.5<L>  /  Alb  2.5<L>  /  TBili  0.4  /  DBili  x   /  AST  30  /  ALT  20  /  AlkPhos  87      PT/INR - ( 26 May 2019 21:06 )   PT: 11.6 sec;   INR: 1.03 ratio         PTT - ( 26 May 2019 21:06 )  PTT:24.4 sec  Urinalysis Basic - ( 26 May 2019 21:06 )    Color: Yellow / Appearance: Slightly Turbid / S.010 / pH: x  Gluc: x / Ketone: Negative  / Bili: Negative / Urobili: Negative mg/dL   Blood: x / Protein: 100 mg/dL / Nitrite: Negative   Leuk Esterase: Moderate / RBC: 3-5 /HPF / WBC >50   Sq Epi: x / Non Sq Epi: Occasional / Bacteria: Many      Cultures;   CAPILLARY BLOOD GLUCOSE      POCT Blood Glucose.: 78 mg/dL (27 May 2019 08:39)  POCT Blood Glucose.: 69 mg/dL (27 May 2019 07:36)  POCT Blood Glucose.: 80 mg/dL (27 May 2019 02:54)  POCT Blood Glucose.: 87 mg/dL (27 May 2019 01:32)  POCT Blood Glucose.: 92 mg/dL (27 May 2019 00:45)  POCT Blood Glucose.: 89 mg/dL (26 May 2019 23:47)  POCT Blood Glucose.: 102 mg/dL (26 May 2019 22:36)  POCT Blood Glucose.: 109 mg/dL (26 May 2019 21:13)  POCT Blood Glucose.: 181 mg/dL (26 May 2019 20:14)  POCT Blood Glucose.: 61 mg/dL (26 May 2019 19:58)    Lipid panel:     CARDIAC MARKERS ( 26 May 2019 21:06 )  .048 ng/mL / x     / x     / x     / x            RADIOLOGY & ADDITIONAL TESTS:      Imaging Personally Reviewed:  [ x] YES      Consultant(s) Notes Reviewed:  [x ] YES     Care Discussed with [x ] Consultants [X ] Patient [x ] Family  [x ]    [x ]  Other; RN Patient is a 89y old  Female who presents with a chief complaint of hypoglycemi (27 May 2019 02:55)      OVERNIGHT EVENTS:  MEDICATIONS  (STANDING):  amLODIPine   Tablet 10 milliGRAM(s) Oral daily  aspirin enteric coated 81 milliGRAM(s) Oral daily  atorvastatin 10 milliGRAM(s) Oral at bedtime  carvedilol 3.125 milliGRAM(s) Oral every 12 hours  cefTRIAXone   IVPB 1 Gram(s) IV Intermittent every 24 hours  dextrose 5% + lactated ringers. 1000 milliLiter(s) (100 mL/Hr) IV Continuous <Continuous>  dextrose 5%. 1000 milliLiter(s) (50 mL/Hr) IV Continuous <Continuous>  dextrose 50% Injectable 12.5 Gram(s) IV Push once  dextrose 50% Injectable 25 Gram(s) IV Push once  dextrose 50% Injectable 25 Gram(s) IV Push once  famotidine    Tablet 20 milliGRAM(s) Oral daily  heparin  Injectable 5000 Unit(s) SubCutaneous every 12 hours  insulin lispro (HumaLOG) corrective regimen sliding scale   SubCutaneous three times a day before meals  insulin lispro (HumaLOG) corrective regimen sliding scale   SubCutaneous at bedtime  levothyroxine 125 MICROGram(s) Oral daily  losartan 50 milliGRAM(s) Oral daily  montelukast 10 milliGRAM(s) Oral daily    MEDICATIONS  (PRN):  dextrose 40% Gel 15 Gram(s) Oral once PRN Blood Glucose LESS THAN 70 milliGRAM(s)/deciliter  glucagon  Injectable 1 milliGRAM(s) IntraMuscular once PRN Glucose LESS THAN 70 milligrams/deciliter      Allergies    No Known Allergies    Intolerances        SUBJECTIVE: in bed in NAD, no acute events overnight     T(F): 98.5 (19 @ 05:02), Max: 98.5 (19 @ 05:02)  HR: 65 (19 @ 05:02) (61 - 103)  BP: 114/44 (19 @ 05:02) (105/31 - 142/51)  RR: 20 (19 @ 05:02) (14 - 20)  SpO2: 100% (19 @ 05:02) (95% - 100%)  Wt(kg): --    PHYSICAL EXAM:  GENERAL: NAD, well-groomed, well-developed  HEAD:  Atraumatic, Normocephalic  EYES: EOMI, PERRLA, conjunctiva and sclera clear  ENMT: No tonsillar erythema, exudates, or enlargement; Moist mucous membranes, Good dentition, No lesions  NECK: Supple, No JVD, Normal thyroid  CHEST/LUNG: Clear to  auscultation bilaterally; No rales, rhonchi, wheezing, or rubs  bilaterally  HEART: Regular rate and rhythm; No murmurs, rubs, or gallops  ABDOMEN: Soft, Nontender, Nondistended; Bowel sounds present  EXTREMITIES:  2+ Peripheral Pulses, No clubbing, cyanosis, swelling to upper left upper extremity  SKIN: No rashes or lesions, healed sacral     mild redness to heels b/l   NERVOUS SYSTEM:  demented, functional quad   DTRs 2+ intact and symmetric, sensation intact BL    LABS:                        10.3   13.84 )-----------( 255      ( 26 May 2019 21:06 )             31.4         136  |  101  |  23  ----------------------------<  45<LL>  4.0   |  24  |  1.52<H>    Ca    8.6      26 May 2019 21:06    TPro  5.5<L>  /  Alb  2.5<L>  /  TBili  0.4  /  DBili  x   /  AST  30  /  ALT  20  /  AlkPhos  87      PT/INR - ( 26 May 2019 21:06 )   PT: 11.6 sec;   INR: 1.03 ratio         PTT - ( 26 May 2019 21:06 )  PTT:24.4 sec  Urinalysis Basic - ( 26 May 2019 21:06 )    Color: Yellow / Appearance: Slightly Turbid / S.010 / pH: x  Gluc: x / Ketone: Negative  / Bili: Negative / Urobili: Negative mg/dL   Blood: x / Protein: 100 mg/dL / Nitrite: Negative   Leuk Esterase: Moderate / RBC: 3-5 /HPF / WBC >50   Sq Epi: x / Non Sq Epi: Occasional / Bacteria: Many      Cultures;   CAPILLARY BLOOD GLUCOSE      POCT Blood Glucose.: 78 mg/dL (27 May 2019 08:39)  POCT Blood Glucose.: 69 mg/dL (27 May 2019 07:36)  POCT Blood Glucose.: 80 mg/dL (27 May 2019 02:54)  POCT Blood Glucose.: 87 mg/dL (27 May 2019 01:32)  POCT Blood Glucose.: 92 mg/dL (27 May 2019 00:45)  POCT Blood Glucose.: 89 mg/dL (26 May 2019 23:47)  POCT Blood Glucose.: 102 mg/dL (26 May 2019 22:36)  POCT Blood Glucose.: 109 mg/dL (26 May 2019 21:13)  POCT Blood Glucose.: 181 mg/dL (26 May 2019 20:14)  POCT Blood Glucose.: 61 mg/dL (26 May 2019 19:58)    Lipid panel:     CARDIAC MARKERS ( 26 May 2019 21:06 )  .048 ng/mL / x     / x     / x     / x            RADIOLOGY & ADDITIONAL TESTS:      Imaging Personally Reviewed:  [ x] YES      Consultant(s) Notes Reviewed:  [x ] YES     Care Discussed with [x ] Consultants [X ] Patient [x ] Family  [x ]    [x ]  Other; RN

## 2019-05-27 NOTE — PROGRESS NOTE ADULT - PROBLEM SELECTOR PLAN 2
likely from hypoglycemia monitor in setting of dementia admit to tele  cont d5 LR  hold insulin for now  frequent FS

## 2019-05-27 NOTE — H&P ADULT - PROBLEM SELECTOR PLAN 5
sligthly worse then baseline  iv hydration an repeat cont coreg for rate control  no longer on ac had prior gib

## 2019-05-27 NOTE — PROGRESS NOTE ADULT - PROBLEM SELECTOR PLAN 5
cont coreg for rate control  no longer on ac had prior gib cont coreg for rate control  no longer on ac had prior GIB

## 2019-05-27 NOTE — H&P ADULT - HISTORY OF PRESENT ILLNESS
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 Pt was given honey nd ems called and was 51 when ems arrived she received d50.  pt in ed had been lethargic requiring further d50 and then d5 gtt.  Pt unable to contribute to sx.  but no reports or fever, chills, sob, cp, palpitations, n/v/d/c did have decreased po intake.  no longer on sulfonylureas, but on insulin.

## 2019-05-27 NOTE — PROGRESS NOTE ADULT - ASSESSMENT
89 y/o female w/pmhx of dementia (AAOx1, primarily bedbound, lives at home with 24 hour HHA ), recurrent hyponatremia, hypothyroidism, afib (no longer on Eliquis after gib), R. ABELARDO cerebral infarct with no residual deficits (07/2018), CKD 3, HTN, HLD, T2DM (A1C 9.6% 10/2018), asthma w/marked hypoglycemia and uti

## 2019-05-28 ENCOUNTER — APPOINTMENT (OUTPATIENT)
Dept: HOME HEALTH SERVICES | Facility: HOME HEALTH | Age: 84
End: 2019-05-28

## 2019-05-28 LAB
ANION GAP SERPL CALC-SCNC: 9 MMOL/L — SIGNIFICANT CHANGE UP (ref 5–17)
BUN SERPL-MCNC: 18 MG/DL — SIGNIFICANT CHANGE UP (ref 7–23)
CALCIUM SERPL-MCNC: 8.7 MG/DL — SIGNIFICANT CHANGE UP (ref 8.5–10.1)
CHLORIDE SERPL-SCNC: 101 MMOL/L — SIGNIFICANT CHANGE UP (ref 96–108)
CO2 SERPL-SCNC: 24 MMOL/L — SIGNIFICANT CHANGE UP (ref 22–31)
CREAT SERPL-MCNC: 1.19 MG/DL — SIGNIFICANT CHANGE UP (ref 0.5–1.3)
GLUCOSE BLDC GLUCOMTR-MCNC: 150 MG/DL — HIGH (ref 70–99)
GLUCOSE BLDC GLUCOMTR-MCNC: 159 MG/DL — HIGH (ref 70–99)
GLUCOSE BLDC GLUCOMTR-MCNC: 169 MG/DL — HIGH (ref 70–99)
GLUCOSE BLDC GLUCOMTR-MCNC: 182 MG/DL — HIGH (ref 70–99)
GLUCOSE SERPL-MCNC: 164 MG/DL — HIGH (ref 70–99)
HBA1C BLD-MCNC: 5.2 % — SIGNIFICANT CHANGE UP (ref 4–5.6)
HCT VFR BLD CALC: 31.3 % — LOW (ref 34.5–45)
HGB BLD-MCNC: 10 G/DL — LOW (ref 11.5–15.5)
MAGNESIUM SERPL-MCNC: 1.9 MG/DL — SIGNIFICANT CHANGE UP (ref 1.6–2.6)
MCHC RBC-ENTMCNC: 25.9 PG — LOW (ref 27–34)
MCHC RBC-ENTMCNC: 31.9 GM/DL — LOW (ref 32–36)
MCV RBC AUTO: 81.1 FL — SIGNIFICANT CHANGE UP (ref 80–100)
NRBC # BLD: 0 /100 WBCS — SIGNIFICANT CHANGE UP (ref 0–0)
PHOSPHATE SERPL-MCNC: 2.6 MG/DL — SIGNIFICANT CHANGE UP (ref 2.5–4.5)
PLATELET # BLD AUTO: 244 K/UL — SIGNIFICANT CHANGE UP (ref 150–400)
POTASSIUM SERPL-MCNC: 4 MMOL/L — SIGNIFICANT CHANGE UP (ref 3.5–5.3)
POTASSIUM SERPL-SCNC: 4 MMOL/L — SIGNIFICANT CHANGE UP (ref 3.5–5.3)
RBC # BLD: 3.86 M/UL — SIGNIFICANT CHANGE UP (ref 3.8–5.2)
RBC # FLD: 15.8 % — HIGH (ref 10.3–14.5)
SODIUM SERPL-SCNC: 134 MMOL/L — LOW (ref 135–145)
WBC # BLD: 13 K/UL — HIGH (ref 3.8–10.5)
WBC # FLD AUTO: 13 K/UL — HIGH (ref 3.8–10.5)

## 2019-05-28 PROCEDURE — 99233 SBSQ HOSP IP/OBS HIGH 50: CPT

## 2019-05-28 RX ORDER — CARVEDILOL PHOSPHATE 80 MG/1
6.25 CAPSULE, EXTENDED RELEASE ORAL EVERY 12 HOURS
Refills: 0 | Status: DISCONTINUED | OUTPATIENT
Start: 2019-05-28 | End: 2019-05-29

## 2019-05-28 RX ADMIN — Medication 125 MICROGRAM(S): at 05:32

## 2019-05-28 RX ADMIN — ATORVASTATIN CALCIUM 10 MILLIGRAM(S): 80 TABLET, FILM COATED ORAL at 21:31

## 2019-05-28 RX ADMIN — FAMOTIDINE 20 MILLIGRAM(S): 10 INJECTION INTRAVENOUS at 11:39

## 2019-05-28 RX ADMIN — Medication 1: at 11:32

## 2019-05-28 RX ADMIN — CEFTRIAXONE 100 GRAM(S): 500 INJECTION, POWDER, FOR SOLUTION INTRAMUSCULAR; INTRAVENOUS at 21:31

## 2019-05-28 RX ADMIN — LOSARTAN POTASSIUM 50 MILLIGRAM(S): 100 TABLET, FILM COATED ORAL at 05:32

## 2019-05-28 RX ADMIN — Medication 1: at 08:20

## 2019-05-28 RX ADMIN — MONTELUKAST 10 MILLIGRAM(S): 4 TABLET, CHEWABLE ORAL at 21:27

## 2019-05-28 RX ADMIN — AMLODIPINE BESYLATE 10 MILLIGRAM(S): 2.5 TABLET ORAL at 05:32

## 2019-05-28 RX ADMIN — Medication 81 MILLIGRAM(S): at 11:40

## 2019-05-28 RX ADMIN — HEPARIN SODIUM 5000 UNIT(S): 5000 INJECTION INTRAVENOUS; SUBCUTANEOUS at 05:31

## 2019-05-28 RX ADMIN — CARVEDILOL PHOSPHATE 3.12 MILLIGRAM(S): 80 CAPSULE, EXTENDED RELEASE ORAL at 05:32

## 2019-05-28 RX ADMIN — HEPARIN SODIUM 5000 UNIT(S): 5000 INJECTION INTRAVENOUS; SUBCUTANEOUS at 17:40

## 2019-05-28 RX ADMIN — CARVEDILOL PHOSPHATE 6.25 MILLIGRAM(S): 80 CAPSULE, EXTENDED RELEASE ORAL at 17:37

## 2019-05-28 NOTE — PROGRESS NOTE ADULT - PROBLEM SELECTOR PLAN 1
improving per family and at baseline if any change then would get MRI given h/o cva. mri screening form already  done if needed.

## 2019-05-28 NOTE — PROGRESS NOTE ADULT - SUBJECTIVE AND OBJECTIVE BOX
Patient is a 89y old  Female who presents with a chief complaint of hypoglycemi (27 May 2019 02:55)    MEDICATIONS  (STANDING):  amLODIPine   Tablet 10 milliGRAM(s) Oral daily  aspirin enteric coated 81 milliGRAM(s) Oral daily  atorvastatin 10 milliGRAM(s) Oral at bedtime  carvedilol 3.125 milliGRAM(s) Oral every 12 hours  cefTRIAXone   IVPB 1 Gram(s) IV Intermittent every 24 hours  dextrose 5% + lactated ringers. 1000 milliLiter(s) (100 mL/Hr) IV Continuous <Continuous>  dextrose 5%. 1000 milliLiter(s) (50 mL/Hr) IV Continuous <Continuous>  dextrose 50% Injectable 12.5 Gram(s) IV Push once  dextrose 50% Injectable 25 Gram(s) IV Push once  dextrose 50% Injectable 25 Gram(s) IV Push once  famotidine    Tablet 20 milliGRAM(s) Oral daily  heparin  Injectable 5000 Unit(s) SubCutaneous every 12 hours  insulin lispro (HumaLOG) corrective regimen sliding scale   SubCutaneous three times a day before meals  insulin lispro (HumaLOG) corrective regimen sliding scale   SubCutaneous at bedtime  levothyroxine 125 MICROGram(s) Oral daily  losartan 50 milliGRAM(s) Oral daily  montelukast 10 milliGRAM(s) Oral daily    MEDICATIONS  (PRN):  dextrose 40% Gel 15 Gram(s) Oral once PRN Blood Glucose LESS THAN 70 milliGRAM(s)/deciliter  glucagon  Injectable 1 milliGRAM(s) IntraMuscular once PRN Glucose LESS THAN 70 milligrams/deciliter    Allergies    No Known Allergies    Intolerances        SUBJECTIVE: in bed in NAD, no acute events overnight     Vital Signs Last 24 Hrs  T(C): 36.3 (28 May 2019 11:58), Max: 37.2 (27 May 2019 17:00)  T(F): 97.3 (28 May 2019 11:58), Max: 98.9 (27 May 2019 17:00)  HR: 72 (28 May 2019 11:58) (65 - 89)  BP: 143/104 (28 May 2019 11:58) (143/104 - 175/71)  BP(mean): --  RR: 18 (28 May 2019 11:58) (16 - 20)  SpO2: 100% (28 May 2019 11:58) (94% - 100%)    PHYSICAL EXAM:  GENERAL: NAD, well-groomed, well-developed  HEAD:  Atraumatic, Normocephalic  EYES: EOMI, PERRLA, conjunctiva and sclera clear  ENMT: No tonsillar erythema, exudates, or enlargement; Moist mucous membranes, Good dentition, No lesions  NECK: Supple, No JVD, Normal thyroid  CHEST/LUNG: Clear to  auscultation bilaterally; No rales, rhonchi, wheezing, or rubs  bilaterally  HEART: Regular rate and rhythm; No murmurs, rubs, or gallops  ABDOMEN: Soft, Nontender, Nondistended; Bowel sounds present  EXTREMITIES:  2+ Peripheral Pulses, No clubbing, cyanosis, swelling to upper left upper extremity  SKIN: No rashes or lesions, healed sacral     mild redness to heels b/l   NERVOUS SYSTEM:  demented, functional quad   DTRs 2+ intact and symmetric, sensation intact BL    LABS:                                     10.0   13.00 )-----------( 244      ( 28 May 2019 07:08 )             31.3   -28    134<L>  |  101  |  18  ----------------------------<  164<H>  4.0   |  24  |  1.19    Ca    8.7      28 May 2019 07:08  Phos  2.6     -  Mg     1.9     -28    TPro  5.5<L>  /  Alb  2.5<L>  /  TBili  0.4  /  DBili  x   /  AST  30  /  ALT  20  /  AlkPhos  87  05-26     PTT - ( 26 May 2019 21:06 )  PTT:24.4 sec  Urinalysis Basic - ( 26 May 2019 21:06 )    Color: Yellow / Appearance: Slightly Turbid / S.010 / pH: x  Gluc: x / Ketone: Negative  / Bili: Negative / Urobili: Negative mg/dL   Blood: x / Protein: 100 mg/dL / Nitrite: Negative   Leuk Esterase: Moderate / RBC: 3-5 /HPF / WBC >50   Sq Epi: x / Non Sq Epi: Occasional / Bacteria: Many      Cultures;     CAPILLARY BLOOD GLUCOSE      POCT Blood Glucose.: 182 mg/dL (28 May 2019 11:31)  POCT Blood Glucose.: 159 mg/dL (28 May 2019 08:14)  POCT Blood Glucose.: 159 mg/dL (27 May 2019 21:45)  POCT Blood Glucose.: 134 mg/dL (27 May 2019 18:10)    Lipid panel:     CARDIAC MARKERS ( 26 May 2019 21:06 )  .048 ng/mL / x     / x     / x     / x            RADIOLOGY & ADDITIONAL TESTS:      Imaging Personally Reviewed:  [ x] YES      Consultant(s) Notes Reviewed:  [x ] YES     Care Discussed with [x ] Consultants [X ] Patient [x ] Family  [x ]    [x ]  Other; RN

## 2019-05-29 ENCOUNTER — TRANSCRIPTION ENCOUNTER (OUTPATIENT)
Age: 84
End: 2019-05-29

## 2019-05-29 VITALS
RESPIRATION RATE: 18 BRPM | TEMPERATURE: 98 F | HEART RATE: 67 BPM | SYSTOLIC BLOOD PRESSURE: 142 MMHG | DIASTOLIC BLOOD PRESSURE: 66 MMHG | OXYGEN SATURATION: 99 %

## 2019-05-29 LAB
GLUCOSE BLDC GLUCOMTR-MCNC: 147 MG/DL — HIGH (ref 70–99)
GLUCOSE BLDC GLUCOMTR-MCNC: 149 MG/DL — HIGH (ref 70–99)
HCT VFR BLD CALC: 29.2 % — LOW (ref 34.5–45)
HGB BLD-MCNC: 9.5 G/DL — LOW (ref 11.5–15.5)
MCHC RBC-ENTMCNC: 26.4 PG — LOW (ref 27–34)
MCHC RBC-ENTMCNC: 32.5 GM/DL — SIGNIFICANT CHANGE UP (ref 32–36)
MCV RBC AUTO: 81.1 FL — SIGNIFICANT CHANGE UP (ref 80–100)
NRBC # BLD: 0 /100 WBCS — SIGNIFICANT CHANGE UP (ref 0–0)
PLATELET # BLD AUTO: 216 K/UL — SIGNIFICANT CHANGE UP (ref 150–400)
RBC # BLD: 3.6 M/UL — LOW (ref 3.8–5.2)
RBC # FLD: 15.7 % — HIGH (ref 10.3–14.5)
WBC # BLD: 11.16 K/UL — HIGH (ref 3.8–10.5)
WBC # FLD AUTO: 11.16 K/UL — HIGH (ref 3.8–10.5)

## 2019-05-29 PROCEDURE — 99239 HOSP IP/OBS DSCHRG MGMT >30: CPT

## 2019-05-29 RX ORDER — SITAGLIPTIN 50 MG/1
1 TABLET, FILM COATED ORAL
Qty: 30 | Refills: 0
Start: 2019-05-29 | End: 2019-06-27

## 2019-05-29 RX ORDER — LOSARTAN POTASSIUM 100 MG/1
1 TABLET, FILM COATED ORAL
Qty: 0 | Refills: 0 | DISCHARGE

## 2019-05-29 RX ORDER — MONTELUKAST 4 MG/1
1 TABLET, CHEWABLE ORAL
Qty: 0 | Refills: 0 | DISCHARGE
Start: 2019-05-29

## 2019-05-29 RX ORDER — LOSARTAN POTASSIUM 100 MG/1
1 TABLET, FILM COATED ORAL
Qty: 0 | Refills: 0 | DISCHARGE
Start: 2019-05-29

## 2019-05-29 RX ORDER — LEVOTHYROXINE SODIUM 125 MCG
1 TABLET ORAL
Qty: 0 | Refills: 0 | DISCHARGE

## 2019-05-29 RX ORDER — CARVEDILOL PHOSPHATE 80 MG/1
1 CAPSULE, EXTENDED RELEASE ORAL
Qty: 0 | Refills: 0 | DISCHARGE
Start: 2019-05-29

## 2019-05-29 RX ORDER — AMLODIPINE BESYLATE 2.5 MG/1
1 TABLET ORAL
Qty: 0 | Refills: 0 | DISCHARGE

## 2019-05-29 RX ORDER — FAMOTIDINE 10 MG/ML
0 INJECTION INTRAVENOUS
Qty: 0 | Refills: 0 | DISCHARGE

## 2019-05-29 RX ORDER — AMLODIPINE BESYLATE 2.5 MG/1
1 TABLET ORAL
Qty: 30 | Refills: 0
Start: 2019-05-29 | End: 2019-06-27

## 2019-05-29 RX ORDER — CARVEDILOL PHOSPHATE 80 MG/1
1 CAPSULE, EXTENDED RELEASE ORAL
Qty: 60 | Refills: 0
Start: 2019-05-29 | End: 2019-06-27

## 2019-05-29 RX ORDER — FAMOTIDINE 10 MG/ML
1 INJECTION INTRAVENOUS
Qty: 0 | Refills: 0 | DISCHARGE
Start: 2019-05-29

## 2019-05-29 RX ORDER — LEVOTHYROXINE SODIUM 125 MCG
1 TABLET ORAL
Qty: 0 | Refills: 0 | DISCHARGE
Start: 2019-05-29

## 2019-05-29 RX ORDER — INSULIN DETEMIR 100/ML (3)
0 INSULIN PEN (ML) SUBCUTANEOUS
Qty: 0 | Refills: 0 | DISCHARGE

## 2019-05-29 RX ORDER — IPRATROPIUM/ALBUTEROL SULFATE 18-103MCG
3 AEROSOL WITH ADAPTER (GRAM) INHALATION ONCE
Refills: 0 | Status: COMPLETED | OUTPATIENT
Start: 2019-05-29 | End: 2019-05-29

## 2019-05-29 RX ORDER — SODIUM CHLORIDE 9 MG/ML
1 INJECTION INTRAMUSCULAR; INTRAVENOUS; SUBCUTANEOUS
Qty: 13 | Refills: 0
Start: 2019-05-29 | End: 2019-06-27

## 2019-05-29 RX ORDER — MONTELUKAST 4 MG/1
1 TABLET, CHEWABLE ORAL
Qty: 0 | Refills: 0 | DISCHARGE

## 2019-05-29 RX ORDER — CARVEDILOL PHOSPHATE 80 MG/1
1 CAPSULE, EXTENDED RELEASE ORAL
Qty: 0 | Refills: 0 | DISCHARGE

## 2019-05-29 RX ORDER — SODIUM CHLORIDE 9 MG/ML
0 INJECTION INTRAMUSCULAR; INTRAVENOUS; SUBCUTANEOUS
Qty: 0 | Refills: 0 | DISCHARGE

## 2019-05-29 RX ADMIN — Medication 125 MICROGRAM(S): at 05:55

## 2019-05-29 RX ADMIN — CARVEDILOL PHOSPHATE 6.25 MILLIGRAM(S): 80 CAPSULE, EXTENDED RELEASE ORAL at 05:55

## 2019-05-29 RX ADMIN — CEFTRIAXONE 100 GRAM(S): 500 INJECTION, POWDER, FOR SOLUTION INTRAMUSCULAR; INTRAVENOUS at 12:56

## 2019-05-29 RX ADMIN — LOSARTAN POTASSIUM 50 MILLIGRAM(S): 100 TABLET, FILM COATED ORAL at 05:55

## 2019-05-29 RX ADMIN — HEPARIN SODIUM 5000 UNIT(S): 5000 INJECTION INTRAVENOUS; SUBCUTANEOUS at 05:55

## 2019-05-29 RX ADMIN — AMLODIPINE BESYLATE 10 MILLIGRAM(S): 2.5 TABLET ORAL at 05:55

## 2019-05-29 RX ADMIN — Medication 3 MILLILITER(S): at 15:22

## 2019-05-29 RX ADMIN — Medication 81 MILLIGRAM(S): at 12:56

## 2019-05-29 RX ADMIN — FAMOTIDINE 20 MILLIGRAM(S): 10 INJECTION INTRAVENOUS at 12:56

## 2019-05-29 NOTE — PROGRESS NOTE ADULT - PROBLEM SELECTOR PLAN 6
improved better than baseline
slightly worse then baseline  iv hydration and repeat
slightly worse then baseline  iv hydration and repeat

## 2019-05-29 NOTE — PROGRESS NOTE ADULT - SUBJECTIVE AND OBJECTIVE BOX
Patient is a 89y old  Female who presents with a chief complaint of hypoglycemi (27 May 2019 02:55)      MEDICATIONS  (STANDING):  amLODIPine   Tablet 10 milliGRAM(s) Oral daily  aspirin enteric coated 81 milliGRAM(s) Oral daily  atorvastatin 10 milliGRAM(s) Oral at bedtime  carvedilol 6.25 milliGRAM(s) Oral every 12 hours  cefTRIAXone   IVPB 1 Gram(s) IV Intermittent every 24 hours  dextrose 5% + lactated ringers. 1000 milliLiter(s) (100 mL/Hr) IV Continuous <Continuous>  dextrose 5%. 1000 milliLiter(s) (50 mL/Hr) IV Continuous <Continuous>  dextrose 50% Injectable 12.5 Gram(s) IV Push once  dextrose 50% Injectable 25 Gram(s) IV Push once  dextrose 50% Injectable 25 Gram(s) IV Push once  famotidine    Tablet 20 milliGRAM(s) Oral daily  heparin  Injectable 5000 Unit(s) SubCutaneous every 12 hours  insulin lispro (HumaLOG) corrective regimen sliding scale   SubCutaneous three times a day before meals  insulin lispro (HumaLOG) corrective regimen sliding scale   SubCutaneous at bedtime  levothyroxine 125 MICROGram(s) Oral daily  losartan 50 milliGRAM(s) Oral daily  montelukast 10 milliGRAM(s) Oral daily    MEDICATIONS  (PRN):  dextrose 40% Gel 15 Gram(s) Oral once PRN Blood Glucose LESS THAN 70 milliGRAM(s)/deciliter  glucagon  Injectable 1 milliGRAM(s) IntraMuscular once PRN Glucose LESS THAN 70 milligrams/deciliter    Allergies    No Known Allergies    Intolerances        SUBJECTIVE: in bed in NAD, no acute events overnight     Vital Signs Last 24 Hrs  T(C): 36.7 (29 May 2019 06:16), Max: 37.4 (28 May 2019 18:20)  T(F): 98.1 (29 May 2019 06:16), Max: 99.3 (28 May 2019 18:20)  HR: 72 (29 May 2019 06:16) (64 - 80)  BP: 135/71 (29 May 2019 06:16) (135/71 - 172/63)  BP(mean): --  RR: 18 (29 May 2019 06:16) (17 - 18)  SpO2: 99% (29 May 2019 06:16) (97% - 100%)      PHYSICAL EXAM:  GENERAL: NAD, well-groomed, well-developed  HEAD:  Atraumatic, Normocephalic  EYES: EOMI, PERRLA, conjunctiva and sclera clear  ENMT: No tonsillar erythema, exudates, or enlargement; Moist mucous membranes, Good dentition, No lesions  NECK: Supple, No JVD, Normal thyroid  CHEST/LUNG: Clear to  auscultation bilaterally; No rales, rhonchi, wheezing, or rubs  bilaterally  HEART: Regular rate and rhythm; No murmurs, rubs, or gallops  ABDOMEN: Soft, Nontender, Nondistended; Bowel sounds present  EXTREMITIES:  2+ Peripheral Pulses, No clubbing, cyanosis, improved swelling to left hands   SKIN: No rashes or lesions, healed sacral     mild redness to heels b/l   NERVOUS SYSTEM:  demented, functional quad   DTRs 2+ intact and symmetric, sensation intact BL    LABS:               Cultures;     Hemoglobin A1C, Whole Blood in AM (05.28.19 @ 08:37)    Hemoglobin A1C, Whole Blood: 5.2: Method: Immunoassay       Reference Range                4.0-5.6%       High risk (prediabetic)        5.7-6.4%       Diabetic, diagnostic             >=6.5%       ADA diabetic treatment goal       <7.0%  The Hemoglobin A1c testing is NGSP-certified.Reference ranges are based  upon the 2010 recommendations of  the American Diabetes Association.  Interpretation may vary for children  and adolescents. %        CAPILLARY BLOOD GLUCOSE      POCT Blood Glucose.: 149 mg/dL (29 May 2019 07:36)  POCT Blood Glucose.: 169 mg/dL (28 May 2019 22:22)  POCT Blood Glucose.: 150 mg/dL (28 May 2019 16:33)  POCT Blood Glucose.: 182 mg/dL (28 May 2019 11:31)    Lipid panel:     CARDIAC MARKERS ( 26 May 2019 21:06 )  .048 ng/mL / x     / x     / x     / x            RADIOLOGY & ADDITIONAL TESTS:      Imaging Personally Reviewed:  [ x] YES      Consultant(s) Notes Reviewed:  [x ] YES     Care Discussed with [x ] Consultants [X ] Patient [x ] Family  [x ]    [x ]  Other; RN

## 2019-05-29 NOTE — DISCHARGE NOTE NURSING/CASE MANAGEMENT/SOCIAL WORK - [email or web address]_GEN_ALL_CORE
You can access the [facility] Patient Portal, offered by [facility], by registering with the following website: [email or web address]

## 2019-05-29 NOTE — PROGRESS NOTE ADULT - ATTENDING COMMENTS
swelling left upper ext : doppler negative for dvt . local care elevate arm  updated family at bedside
swelling left upper ext : doppler negative for dvt . local care elevate arm  updated family at bedside
swelling left upper ext : f/u doppler of left upper ext   updated family at bedside

## 2019-05-29 NOTE — DISCHARGE NOTE PROVIDER - HOSPITAL COURSE
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  Plan: improving per family and at baseline.          Problem/Plan - 2:    ·  Problem: Hypoglycemia.  Plan: resolved     hgba1c at 5.2 no need for insulin.          Problem/Plan - 3:    ·  Problem: Acute cystitis without hematuria.  Plan: blood and urine cx ngtd    s/p 4 dose of ceftriaxone.          Problem/Plan - 4:    ·  Problem: Hypothyroidism.  Plan: cont synthroid.          Problem/Plan - 5:    ·  Problem: Atrial fibrillation.  Plan: cont coreg for rate control    no longer on ac had prior GIB.          Problem/Plan - 6:    Problem: CKD (chronic kidney disease), stage III. Plan: improved better than baseline.         Problem/Plan - 7:    ·  Problem: HLD (hyperlipidemia).  Plan: cont statin.          Problem/Plan - 8:    ·  Problem: HTN (hypertension).  Plan: cont out pt med.          Problem/Plan - 9:    ·  Problem: Type 2 diabetes mellitus.  Plan: hypoglycemia protocol.  does not need insulin         Problem/Plan - 10:    Problem: Functional quadriplegia. Plan; has 24 hour aid        Attending Attestation:     swelling left upper ext : doppler negative for dvt . local care elevate arm, patient favors to lay on this arm     updated family at bedside.        TIME SPENT COMPLETING DISCHARGE AND COORDINATING CARE WITH NURSING,  AND CASE MANAGEMENT APPROX 40MINUTES

## 2019-05-29 NOTE — PROGRESS NOTE ADULT - PROBLEM SELECTOR PLAN 3
blood and urine cx ngtd  s/p 3 dose of ceftriaxone
f/u blood cx and urine cx  ceftriaxone
f/u blood cx and urine cx  ceftriaxone

## 2019-05-31 ENCOUNTER — APPOINTMENT (OUTPATIENT)
Dept: HOME HEALTH SERVICES | Facility: HOME HEALTH | Age: 84
End: 2019-05-31

## 2019-05-31 VITALS
OXYGEN SATURATION: 98 % | RESPIRATION RATE: 16 BRPM | SYSTOLIC BLOOD PRESSURE: 122 MMHG | DIASTOLIC BLOOD PRESSURE: 66 MMHG | TEMPERATURE: 97.8 F | HEART RATE: 78 BPM

## 2019-05-31 RX ORDER — INSULIN DETEMIR 100 [IU]/ML
100 INJECTION, SOLUTION SUBCUTANEOUS AT BEDTIME
Refills: 0 | Status: DISCONTINUED | COMMUNITY
Start: 2019-04-12 | End: 2019-05-31

## 2019-06-01 LAB
CULTURE RESULTS: SIGNIFICANT CHANGE UP
CULTURE RESULTS: SIGNIFICANT CHANGE UP
SPECIMEN SOURCE: SIGNIFICANT CHANGE UP
SPECIMEN SOURCE: SIGNIFICANT CHANGE UP

## 2019-06-03 DIAGNOSIS — E11.649 TYPE 2 DIABETES MELLITUS WITH HYPOGLYCEMIA WITHOUT COMA: ICD-10-CM

## 2019-06-03 DIAGNOSIS — R53.2 FUNCTIONAL QUADRIPLEGIA: ICD-10-CM

## 2019-06-03 DIAGNOSIS — E11.22 TYPE 2 DIABETES MELLITUS WITH DIABETIC CHRONIC KIDNEY DISEASE: ICD-10-CM

## 2019-06-03 DIAGNOSIS — Z86.73 PERSONAL HISTORY OF TRANSIENT ISCHEMIC ATTACK (TIA), AND CEREBRAL INFARCTION WITHOUT RESIDUAL DEFICITS: ICD-10-CM

## 2019-06-03 DIAGNOSIS — N18.3 CHRONIC KIDNEY DISEASE, STAGE 3 (MODERATE): ICD-10-CM

## 2019-06-03 DIAGNOSIS — I12.9 HYPERTENSIVE CHRONIC KIDNEY DISEASE WITH STAGE 1 THROUGH STAGE 4 CHRONIC KIDNEY DISEASE, OR UNSPECIFIED CHRONIC KIDNEY DISEASE: ICD-10-CM

## 2019-06-03 DIAGNOSIS — E03.9 HYPOTHYROIDISM, UNSPECIFIED: ICD-10-CM

## 2019-06-03 DIAGNOSIS — E78.5 HYPERLIPIDEMIA, UNSPECIFIED: ICD-10-CM

## 2019-06-03 DIAGNOSIS — G93.41 METABOLIC ENCEPHALOPATHY: ICD-10-CM

## 2019-06-03 DIAGNOSIS — N30.00 ACUTE CYSTITIS WITHOUT HEMATURIA: ICD-10-CM

## 2019-06-03 DIAGNOSIS — I48.91 UNSPECIFIED ATRIAL FIBRILLATION: ICD-10-CM

## 2019-06-03 DIAGNOSIS — M79.89 OTHER SPECIFIED SOFT TISSUE DISORDERS: ICD-10-CM

## 2019-06-04 ENCOUNTER — MEDICATION RENEWAL (OUTPATIENT)
Age: 84
End: 2019-06-04

## 2019-06-12 ENCOUNTER — APPOINTMENT (OUTPATIENT)
Dept: HOME HEALTH SERVICES | Facility: HOME HEALTH | Age: 84
End: 2019-06-12
Payer: MEDICARE

## 2019-06-12 VITALS
RESPIRATION RATE: 14 BRPM | OXYGEN SATURATION: 98 % | DIASTOLIC BLOOD PRESSURE: 62 MMHG | TEMPERATURE: 97.1 F | SYSTOLIC BLOOD PRESSURE: 150 MMHG | HEART RATE: 60 BPM

## 2019-06-12 DIAGNOSIS — M79.89 OTHER SPECIFIED SOFT TISSUE DISORDERS: ICD-10-CM

## 2019-06-12 DIAGNOSIS — E88.09 OTHER DISORDERS OF PLASMA-PROTEIN METABOLISM, NOT ELSEWHERE CLASSIFIED: ICD-10-CM

## 2019-06-12 DIAGNOSIS — Z71.89 OTHER SPECIFIED COUNSELING: ICD-10-CM

## 2019-06-12 DIAGNOSIS — L89.153 PRESSURE ULCER OF SACRAL REGION, STAGE 3: ICD-10-CM

## 2019-06-12 PROCEDURE — 99349 HOME/RES VST EST MOD MDM 40: CPT

## 2019-06-12 RX ORDER — ESCITALOPRAM OXALATE 10 MG/1
10 TABLET ORAL DAILY
Qty: 90 | Refills: 1 | Status: DISCONTINUED | COMMUNITY
Start: 2019-04-12 | End: 2019-06-12

## 2019-06-14 ENCOUNTER — LABORATORY RESULT (OUTPATIENT)
Age: 84
End: 2019-06-14

## 2019-06-17 PROBLEM — M79.89 ARM SWELLING: Status: RESOLVED | Noted: 2019-05-21 | Resolved: 2019-06-17

## 2019-06-17 PROBLEM — Z71.89 ADVANCED CARE PLANNING/COUNSELING DISCUSSION: Status: RESOLVED | Noted: 2018-11-16 | Resolved: 2019-06-17

## 2019-06-17 PROBLEM — E88.09 HYPOALBUMINEMIA: Status: ACTIVE | Noted: 2019-06-17

## 2019-06-17 PROBLEM — L89.153 PRESSURE INJURY OF SACRAL REGION, STAGE 3: Status: RESOLVED | Noted: 2019-04-12 | Resolved: 2019-06-17

## 2019-06-17 LAB
ALBUMIN SERPL ELPH-MCNC: 3 G/DL
ANION GAP SERPL CALC-SCNC: 14 MMOL/L
BASOPHILS # BLD AUTO: 0.09 K/UL
BASOPHILS NFR BLD AUTO: 0.8 %
BUN SERPL-MCNC: 16 MG/DL
CALCIUM SERPL-MCNC: 8.6 MG/DL
CHLORIDE SERPL-SCNC: 96 MMOL/L
CO2 SERPL-SCNC: 26 MMOL/L
CREAT SERPL-MCNC: 1.15 MG/DL
EOSINOPHIL # BLD AUTO: 0.44 K/UL
EOSINOPHIL NFR BLD AUTO: 4 %
GLUCOSE SERPL-MCNC: 192 MG/DL
HCT VFR BLD CALC: 34.3 %
HGB BLD-MCNC: 10.6 G/DL
IMM GRANULOCYTES NFR BLD AUTO: 0.3 %
LYMPHOCYTES # BLD AUTO: 3.92 K/UL
LYMPHOCYTES NFR BLD AUTO: 35.8 %
MAN DIFF?: NORMAL
MCHC RBC-ENTMCNC: 26 PG
MCHC RBC-ENTMCNC: 30.9 GM/DL
MCV RBC AUTO: 84.3 FL
MONOCYTES # BLD AUTO: 0.7 K/UL
MONOCYTES NFR BLD AUTO: 6.4 %
NEUTROPHILS # BLD AUTO: 5.76 K/UL
NEUTROPHILS NFR BLD AUTO: 52.7 %
PHOSPHATE SERPL-MCNC: 3.1 MG/DL
PLATELET # BLD AUTO: 266 K/UL
POTASSIUM SERPL-SCNC: 4 MMOL/L
PREALB SERPL NEPH-MCNC: 17 MG/DL
RBC # BLD: 4.07 M/UL
RBC # FLD: 16.2 %
SODIUM SERPL-SCNC: 136 MMOL/L
WBC # FLD AUTO: 10.94 K/UL

## 2019-06-17 NOTE — PHYSICAL EXAM
[Well Nourished] : well nourished [No Acute Distress] : no acute distress [Well Developed] : well developed [Normal Sclera/Conjunctiva] : normal sclera/conjunctiva [EOMI] : extra ocular movement intact [Normal Outer Ear/Nose] : the ears and nose were normal in appearance [No JVD] : no jugular venous distention [No Respiratory Distress] : no respiratory distress [Normal Oropharynx] : the oropharynx was normal [Clear to Auscultation] : lungs were clear to auscultation bilaterally [No Accessory Muscle Use] : no accessory muscle use [Normal Rate] : heart rate was normal  [Normal S1, S2] : normal S1 and S2 [Regular Rhythm] : with a regular rhythm [No Edema] : there was no peripheral edema [Normal Bowel Sounds] : normal bowel sounds [No Murmurs] : no murmurs heard [Not Distended] : not distended [Non Tender] : non-tender [Soft] : abdomen soft [No Masses] : no abdominal mass palpated [Normal Post Cervical Nodes] : no posterior cervical lymphadenopathy [Normal Anterior Cervical Nodes] : no anterior cervical lymphadenopathy [No CVA Tenderness] : no ~M costovertebral angle tenderness [No Spinal Tenderness] : no spinal tenderness [Normal Gait] : normal gait [No Rash] : no rash [Normal Strength/Tone] : muscle strength and tone were normal [Normal Affect] : the affect was normal [de-identified] : non-verbal - unable to assess [de-identified] : non-verbal today

## 2019-06-17 NOTE — REASON FOR VISIT
[Pre-Visit Preparation] : pre-visit preparation was done [Family Member] : family member [Formal Caregiver] : formal caregiver [Post Hospitalization] : a post hospitalization visit [Intercurrent Specialty/Sub-specialty Visits] : the patient has no intercurrent specialty/sub-specialty visits [FreeTextEntry1] : for chronic conditions

## 2019-06-17 NOTE — COUNSELING
[Not Indicated] : not indicated [Non - Smoker] : non-smoker [Maintain functional ability] : maintain functional ability [Minimize unnecessary interventions] : minimize unnecessary interventions [Full Code] : Code Status: Full Code [Limited] : Treatment Guidelines: Limited [Trial of Bipap] : Intubation: Trial of Bipap [[facility]: _____[facility]/last verification date: [unfilled] [de-identified] : family wants to discuss MOLST

## 2019-06-17 NOTE — REVIEW OF SYSTEMS
[Memory Loss] : memory loss [Muscle Weakness] : muscle weakness [Fatigue] : fatigue [Negative] : Psychiatric [FreeTextEntry4] : wax build up [de-identified] : as noted in HPI

## 2019-06-17 NOTE — HISTORY OF PRESENT ILLNESS
[Patient] : patient [Formal Caregiver] : formal caregiver [Family Member] : family member [FreeTextEntry1] : debility from h/o CVAs [FreeTextEntry2] : PMH:  type 2 diabetes with hyperlipidemia, hypertension, asthma, Hypothyroid, GERD, atrial fibrillation, (L) MCA stroke (1018) \par \par Pt in chair for visit, non-verbal; was admitted to [facility] from 5/26 - 5/29/19 for UTI & hypoglycemia; daughter reports now giving pt Levemir 2 units if fingersticks >150; also reports albumin was 2.5 in hospital  and daughter has been using protein powder and organic vegetable powder for supplements; wound on sacrum from last visit resolved with Terrasil\par Appetite remains good- pt obese\par Incontinent of bowel and bladder- has no constipation\par \par Hyponatremia- was admitted to [facility] for same in April- now on NaCL tabs three days/week- last Na 133 in May; family has been concerned over increased edema \par \par Afib- off eliquis secondary to vaginal bleed, daughters giving ASA 81mg in place; rate controlled with carvedilol. - has had no further vaginal bleeding\par \par DM: A1c 5.9 in [facility]- as above; also on Januvia;  Glucoses are checked twice daily most <200\par \par HTN/HLD- BP controlled with  carvedilol, losartan- carvedilol was increased to 6.25 BID in December, but daughters still giving 3.125 BID- reports BP can fluctuate up to 160's systolic; on pravastatin\par \par Hypothyroid: TSH 8.4 on 4/23- on levothyroxine 125mcg- dose increased on 4/19\par \par Asthma: well-controlled on Singulair.  \par \par

## 2019-06-24 ENCOUNTER — MEDICATION RENEWAL (OUTPATIENT)
Age: 84
End: 2019-06-24

## 2019-07-04 LAB
BASOPHILS # BLD AUTO: 0.04 K/UL
BASOPHILS NFR BLD AUTO: 0.4 %
EOSINOPHIL # BLD AUTO: 0.24 K/UL
EOSINOPHIL NFR BLD AUTO: 2.1 %
HCT VFR BLD CALC: 33.9 %
HGB BLD-MCNC: 10.6 G/DL
IMM GRANULOCYTES NFR BLD AUTO: 0.3 %
LYMPHOCYTES # BLD AUTO: 4.4 K/UL
LYMPHOCYTES NFR BLD AUTO: 39.1 %
MAN DIFF?: NORMAL
MCHC RBC-ENTMCNC: 26.6 PG
MCHC RBC-ENTMCNC: 31.3 GM/DL
MCV RBC AUTO: 85 FL
MONOCYTES # BLD AUTO: 0.63 K/UL
MONOCYTES NFR BLD AUTO: 5.6 %
NEUTROPHILS # BLD AUTO: 5.91 K/UL
NEUTROPHILS NFR BLD AUTO: 52.5 %
PLATELET # BLD AUTO: 218 K/UL
RBC # BLD: 3.99 M/UL
RBC # FLD: 15.1 %
WBC # FLD AUTO: 11.25 K/UL

## 2019-07-11 ENCOUNTER — CLINICAL ADVICE (OUTPATIENT)
Age: 84
End: 2019-07-11

## 2019-07-12 ENCOUNTER — CLINICAL ADVICE (OUTPATIENT)
Age: 84
End: 2019-07-12

## 2019-07-12 ENCOUNTER — LABORATORY RESULT (OUTPATIENT)
Age: 84
End: 2019-07-12

## 2019-07-12 ENCOUNTER — APPOINTMENT (OUTPATIENT)
Dept: HOME HEALTH SERVICES | Facility: HOME HEALTH | Age: 84
End: 2019-07-12

## 2019-07-12 VITALS
TEMPERATURE: 97.6 F | HEART RATE: 64 BPM | OXYGEN SATURATION: 98 % | RESPIRATION RATE: 16 BRPM | SYSTOLIC BLOOD PRESSURE: 122 MMHG | DIASTOLIC BLOOD PRESSURE: 64 MMHG

## 2019-07-12 DIAGNOSIS — D72.829 ELEVATED WHITE BLOOD CELL COUNT, UNSPECIFIED: ICD-10-CM

## 2019-07-15 LAB
ALBUMIN SERPL ELPH-MCNC: 3.2 G/DL
ALP BLD-CCNC: 83 U/L
ALT SERPL-CCNC: 19 U/L
ANION GAP SERPL CALC-SCNC: 11 MMOL/L
APPEARANCE: CLEAR
AST SERPL-CCNC: 24 U/L
BACTERIA UR CULT: ABNORMAL
BACTERIA: ABNORMAL
BASOPHILS # BLD AUTO: 0.04 K/UL
BASOPHILS NFR BLD AUTO: 0.4 %
BILIRUB SERPL-MCNC: 0.3 MG/DL
BILIRUBIN URINE: NEGATIVE
BLOOD URINE: NEGATIVE
BUN SERPL-MCNC: 27 MG/DL
CALCIUM SERPL-MCNC: 9 MG/DL
CHLORIDE SERPL-SCNC: 98 MMOL/L
CO2 SERPL-SCNC: 26 MMOL/L
COLOR: NORMAL
CREAT SERPL-MCNC: 1.39 MG/DL
EOSINOPHIL # BLD AUTO: 0.24 K/UL
EOSINOPHIL NFR BLD AUTO: 2.3 %
GLUCOSE QUALITATIVE U: NEGATIVE
GLUCOSE SERPL-MCNC: 184 MG/DL
HCT VFR BLD CALC: 37.3 %
HGB BLD-MCNC: 11.2 G/DL
HYALINE CASTS: 1 /LPF
IMM GRANULOCYTES NFR BLD AUTO: 0.3 %
IRON SATN MFR SERPL: 17 %
IRON SERPL-MCNC: 41 UG/DL
KETONES URINE: NEGATIVE
LEUKOCYTE ESTERASE URINE: NEGATIVE
LYMPHOCYTES # BLD AUTO: 3.66 K/UL
LYMPHOCYTES NFR BLD AUTO: 34.4 %
MAN DIFF?: NORMAL
MCHC RBC-ENTMCNC: 26.6 PG
MCHC RBC-ENTMCNC: 30 GM/DL
MCV RBC AUTO: 88.6 FL
MICROSCOPIC-UA: NORMAL
MONOCYTES # BLD AUTO: 0.72 K/UL
MONOCYTES NFR BLD AUTO: 6.8 %
NEUTROPHILS # BLD AUTO: 5.96 K/UL
NEUTROPHILS NFR BLD AUTO: 55.8 %
NITRITE URINE: NEGATIVE
PH URINE: 6
PLATELET # BLD AUTO: 185 K/UL
POTASSIUM SERPL-SCNC: 4.2 MMOL/L
PROT SERPL-MCNC: 5.3 G/DL
PROTEIN URINE: ABNORMAL
RBC # BLD: 4.21 M/UL
RBC # FLD: 14.9 %
RED BLOOD CELLS URINE: 2 /HPF
SODIUM SERPL-SCNC: 135 MMOL/L
SPECIFIC GRAVITY URINE: 1.01
SQUAMOUS EPITHELIAL CELLS: 4 /HPF
TIBC SERPL-MCNC: 239 UG/DL
TSH SERPL-ACNC: 18.9 UIU/ML
UIBC SERPL-MCNC: 198 UG/DL
UROBILINOGEN URINE: NORMAL
WBC # FLD AUTO: 10.65 K/UL
WHITE BLOOD CELLS URINE: 1 /HPF

## 2019-07-17 ENCOUNTER — MEDICATION RENEWAL (OUTPATIENT)
Age: 84
End: 2019-07-17

## 2019-07-24 ENCOUNTER — APPOINTMENT (OUTPATIENT)
Dept: HOME HEALTH SERVICES | Facility: HOME HEALTH | Age: 84
End: 2019-07-24

## 2019-07-24 VITALS
SYSTOLIC BLOOD PRESSURE: 136 MMHG | OXYGEN SATURATION: 96 % | RESPIRATION RATE: 16 BRPM | DIASTOLIC BLOOD PRESSURE: 74 MMHG | HEART RATE: 68 BPM | TEMPERATURE: 98.4 F

## 2019-07-25 ENCOUNTER — RX RENEWAL (OUTPATIENT)
Age: 84
End: 2019-07-25

## 2019-07-25 ENCOUNTER — MEDICATION RENEWAL (OUTPATIENT)
Age: 84
End: 2019-07-25

## 2019-07-30 ENCOUNTER — LABORATORY RESULT (OUTPATIENT)
Age: 84
End: 2019-07-30

## 2019-08-13 NOTE — PATIENT PROFILE ADULT - DO YOU FEEL UNSAFE AT HOME, WORK, OR SCHOOL?
CC:  Patient presents with:  Medication Follow-Up: phentermine      HPI: 40year old female here to follow-up on Phentermine for weight loss.   Her Phentermine is no longer covered by insurance as she has already been on it for 3 months this year but she is control/protection: OCP    Lifestyle      Physical activity:        Days per week: Not on file        Minutes per session: Not on file      Stress: Not on file    Relationships      Social connections:        Talks on phone: Not on file        Pat vasquez no good air entry  Dermatologic:  No rashes or lesions  Psych: normal mood and affect     Assessment and Plan: 40year old female here to follow-up on weight loss goals with Phentermine.     1. BMI 34.0-34.9,adult    - Doing very well overall without side effe

## 2019-08-26 ENCOUNTER — APPOINTMENT (OUTPATIENT)
Dept: HOME HEALTH SERVICES | Facility: HOME HEALTH | Age: 84
End: 2019-08-26
Payer: MEDICARE

## 2019-08-26 VITALS
HEART RATE: 54 BPM | DIASTOLIC BLOOD PRESSURE: 87 MMHG | SYSTOLIC BLOOD PRESSURE: 168 MMHG | OXYGEN SATURATION: 93 % | TEMPERATURE: 97.9 F | RESPIRATION RATE: 14 BRPM

## 2019-08-26 DIAGNOSIS — E03.9 HYPOTHYROIDISM, UNSPECIFIED: ICD-10-CM

## 2019-08-26 DIAGNOSIS — F43.21 ADJUSTMENT DISORDER WITH DEPRESSED MOOD: ICD-10-CM

## 2019-08-26 DIAGNOSIS — R53.2 FUNCTIONAL QUADRIPLEGIA: ICD-10-CM

## 2019-08-26 DIAGNOSIS — L98.429 NON-PRESSURE CHRONIC ULCER OF BACK WITH UNSPECIFIED SEVERITY: ICD-10-CM

## 2019-08-26 PROCEDURE — 99350 HOME/RES VST EST HIGH MDM 60: CPT

## 2019-08-26 RX ORDER — ATORVASTATIN CALCIUM 40 MG/1
40 TABLET, FILM COATED ORAL
Qty: 90 | Refills: 3 | Status: DISCONTINUED | COMMUNITY
Start: 2018-11-16 | End: 2019-08-26

## 2019-08-26 RX ORDER — AMLODIPINE BESYLATE 10 MG/1
10 TABLET ORAL DAILY
Qty: 90 | Refills: 3 | Status: DISCONTINUED | COMMUNITY
Start: 2019-08-26 | End: 2019-08-26

## 2019-08-26 NOTE — REVIEW OF SYSTEMS
[Fatigue] : fatigue [Muscle Weakness] : muscle weakness [Memory Loss] : memory loss [Negative] : Heme/Lymph [FreeTextEntry4] : wax build up [de-identified] : as noted in HPI

## 2019-08-26 NOTE — HISTORY OF PRESENT ILLNESS
[Patient] : patient [Family Member] : family member [Formal Caregiver] : formal caregiver [FreeTextEntry1] : debility from h/o CVAs [FreeTextEntry2] : PMH:  type 2 diabetes with hyperlipidemia, hypertension, asthma, Hypothyroid, GERD, atrial fibrillation, (L) MCA stroke (1018), situational depression.\par \par Patient accompanied by daughter, Lisa, who gave history. She reports that over the weekend, patient developed lethargy, decreased appetite and had foul smell to urine. Son (cardiologist) prescribed a course of ciprofloxacin x 10 days. Lisa has noticed some improvement in patient's appetite as of today. No fevers, chills. Lethargy also improved. Fasting glucoses have been elevated to 200s over the past week as well. Daughter has increased levemir to 6 units nightly instead of previous 2-3 units nightly. \par \par Daughter is concerned about easy bruising which she has noticed over the past several months. She is also concerned with the frequency that the patient is getting UTIs. has had 4 urinary tract infections this year. She is on D-mannose for this. Her biggest concern is that since patient is on fluid restriction it is worsening risk for UTI. \par \par Otherwise, daughter notes that patient is stable. Taking tylenol 650 mg once daily for pain. Previously on BID but family was concerned as she had had some kidney issues recently. No further leg edema, but still has some bilateral hand edema. Bowels are regular. \par \par Hypothyroid: TSH 12.2 on 8/2/19- on levothyroxine 150 mcg- dose increased 7/24/19. Will need repeat in 2 months. \par

## 2019-08-26 NOTE — REASON FOR VISIT
[Formal Caregiver] : formal caregiver [Family Member] : family member [Pre-Visit Preparation] : pre-visit preparation was done [Follow-Up] : a follow-up visit [Intercurrent Specialty/Sub-specialty Visits] : the patient has no intercurrent specialty/sub-specialty visits [FreeTextEntry1] : for chronic conditions

## 2019-08-26 NOTE — LETTER HEADER
[Care Plan reviewed and provided to patient/caregiver] : Care plan reviewed and provided to patient/caregiver [Care Plan managed/Care coordinated by: ___] : Care plan managed/Care coordinated by: [unfilled] [Care Plan reviewed every ___ weeks] : Care plan reviewed every [unfilled] weeks [Patient/Caregiver agrees to have other providers send summary of their care to this office] : Patient/caregiver agrees to have other providers send summary of their care to this office [Initiation or substantial revisions made to care plan involving mod/high medical decision making for complex CCM] : Initiation or substantial revisions made to care plan involving mod/high medical decision making for complex CCM

## 2019-08-26 NOTE — HEALTH RISK ASSESSMENT
[HRA Reviewed] : Health risk assessment reviewed [Full assistance needed] : managing finances [Patient not ambulatory (Wheelchair)] : Patient is not ambulatory (Wheelchair) [Yes] : The patient does have visual impairment [TimeGetUpGo] : 0

## 2019-08-26 NOTE — COUNSELING
[Maintain functional ability] : maintain functional ability [Minimize unnecessary interventions] : minimize unnecessary interventions [Full Code] : Code Status: Full Code [Trial of Bipap] : Intubation: Trial of Bipap [Limited] : Treatment Guidelines: Limited [[facility]: _____[facility]last verification date: [unfilled] [Not Indicated] : not indicated [Non - Smoker] : non-smoker [de-identified] : family wants to discuss MOLST

## 2019-08-26 NOTE — PHYSICAL EXAM
[No Acute Distress] : no acute distress [Well Nourished] : well nourished [Well Developed] : well developed [Normal Sclera/Conjunctiva] : normal sclera/conjunctiva [EOMI] : extra ocular movement intact [Normal Oropharynx] : the oropharynx was normal [Normal Outer Ear/Nose] : the ears and nose were normal in appearance [No JVD] : no jugular venous distention [No Respiratory Distress] : no respiratory distress [Clear to Auscultation] : lungs were clear to auscultation bilaterally [Normal Rate] : heart rate was normal  [No Accessory Muscle Use] : no accessory muscle use [Regular Rhythm] : with a regular rhythm [No Murmurs] : no murmurs heard [Normal S1, S2] : normal S1 and S2 [No Edema] : there was no peripheral edema [Normal Bowel Sounds] : normal bowel sounds [Non Tender] : non-tender [Soft] : abdomen soft [Not Distended] : not distended [No Masses] : no abdominal mass palpated [Normal Post Cervical Nodes] : no posterior cervical lymphadenopathy [Normal Anterior Cervical Nodes] : no anterior cervical lymphadenopathy [No CVA Tenderness] : no ~M costovertebral angle tenderness [No Spinal Tenderness] : no spinal tenderness [Normal Gait] : normal gait [Normal Strength/Tone] : muscle strength and tone were normal [No Rash] : no rash [Normal Affect] : the affect was normal [de-identified] : non-verbal today  [de-identified] : non-verbal - unable to assess

## 2019-08-27 PROBLEM — L98.429 STAGE 2 SKIN ULCER OF SACRAL REGION: Status: ACTIVE | Noted: 2019-08-27

## 2019-08-27 RX ORDER — OINTMENT BASE NO.104
OINTMENT (GRAM) TOPICAL
Qty: 1 | Refills: 0 | Status: ACTIVE | COMMUNITY
Start: 2019-08-27 | End: 1900-01-01

## 2019-08-29 ENCOUNTER — LABORATORY RESULT (OUTPATIENT)
Age: 84
End: 2019-08-29

## 2019-08-30 ENCOUNTER — OTHER (OUTPATIENT)
Age: 84
End: 2019-08-30

## 2019-09-11 ENCOUNTER — CLINICAL ADVICE (OUTPATIENT)
Age: 84
End: 2019-09-11

## 2019-09-19 ENCOUNTER — MEDICATION RENEWAL (OUTPATIENT)
Age: 84
End: 2019-09-19

## 2019-09-23 NOTE — H&P ADULT - NSHPLABSRESULTS_GEN_ALL_CORE
[facility]  Patient Name: Iza Davidson  Procedure Date: 9/23/2019 11:05 AM  MRN: [id number]  Account Number: [id number]  YOB: 1960  Admit Type: Outpatient  Age: 58  Gender: Female  Note Status: Finalized  Attending MD: Preet Iglesias MD  Procedure:                    Colonoscopy  Indications:                  Screening for colorectal malignant neoplasm                                (last colonoscopy was more than 10 years ago)  Providers:                    Preet Iglesias MD  Referring MD:                 Sadie Alonzo Md  Sedation:                     Midazolam 2 mg IV, Fentanyl 50 micrograms IV  Procedure:       Pre-Anesthesia Assessment:       - Prior to the procedure, a History and Physical was performed, and       patient medications and allergies were reviewed. The patient is       competent. The risks and benefits of the procedure and the sedation       options and risks were discussed with the patient. All questions were       answered and informed consent was obtained. Patient identification and       proposed procedure were verified by the physician in the pre-procedure       area. Mental Status Examination: alert and oriented. Airway Examination:       normal oropharyngeal airway and neck mobility. Respiratory Examination:       clear to auscultation. CV Examination: normal. Prophylactic Antibiotics:       The patient does not require prophylactic antibiotics. Prior       Anticoagulants: The patient has taken no previous anticoagulant or       antiplatelet agents. ASA Grade Assessment: II - A patient with mild       systemic disease. After reviewing the risks and benefits, the patient       was deemed in satisfactory condition to undergo the procedure. The       anesthesia plan was to use moderate sedation / analgesia (conscious       sedation). Immediately prior to administration of medications, the       patient was re-assessed for adequacy to receive sedatives. The heart        rate, respiratory rate, oxygen saturations, blood pressure, adequacy of       pulmonary ventilation, and response to care were monitored throughout       the procedure. The physical status of the patient was re-assessed after       the procedure.       A History and Physical was performed prior to the procedure. Patient       medications and allergies were reviewed. The risks and benefits of the       procedure and sedation options were discussed. Questions were answered       and informed consent was obtained. Patient identification and proposed       procedure were verified. The patient was deemed in satisfactory       condition to undergo the procedure. The heart rate, respiratory rate,       oxygen saturations, blood pressure and response to sedation were       monitored throughout the procedure.       The endoscope was passed under direct vision. The Endoscope was       introduced through the anus and advanced to the cecum, identified by       appendiceal orifice and ileocecal valve. The physical status of the       patient was re-assessed after the procedure. The colonoscopy was       performed without difficulty. The patient tolerated the procedure well.       The quality of the bowel preparation was good.  Scope Withdrawal Time 0 hours 7 minutes 17 seconds  Findings:       The perianal and digital rectal examinations were normal.       A 11 mm polyp was found in the hepatic flexure. The polyp was sessile.       The polyp was removed with a saline injection-lift technique using a hot       snare. Resection and retrieval were complete.       A 4 mm polyp was found in the descending colon. The polyp was sessile.       The polyp was removed with a cold biopsy forceps. Resection and       retrieval were complete.       Multiple small and large-mouthed diverticula were found in the entire       colon.       Non-bleeding internal hemorrhoids were found during retroflexion.  Impression:       - One 11 mm polyp at  the hepatic flexure, removed using injection-lift       and a hot snare. Resected and retrieved.       - One 4 mm polyp in the descending colon, removed with a cold biopsy       forceps. Resected and retrieved.       - Diverticulosis in the entire examined colon.       - Non-bleeding internal hemorrhoids.  Moderate Sedation:       17 minutes of conscious sedation, monitored by myself, beginning at       11:10 and ending at 11:27 was utilized for this procedure for this one       encounter       Moderate (conscious) sedation was administered by the endoscopy nurse       and supervised by the endoscopist. The following parameters were       monitored: oxygen saturation, heart rate, blood pressure, respiratory       rate, EKG, adequacy of pulmonary ventilation, and response to care.       Total physician intraservice time was 17 minutes.  Recommendation:       - Await pathology results.       - Repeat colonoscopy date to be determined after pending pathology       results are reviewed for surveillance.       - High fiber diet indefinitely.       - Discharge patient to home (ambulatory).  Complications:       No immediate complications.  MD Preet Devine MD  9/23/2019 11:29:52 AM  This report has been signed electronically by the above.  Number of Addenda: 0  Procedure Code(s):            --- Professional ---                                90074, Colonoscopy, flexible; with removal of                                tumor(s), polyp(s), or other lesion(s) by snare                                technique                                59664, Colonoscopy, flexible; with directed                                submucosal injection(s), any substance                                78780, 59, Colonoscopy, flexible; with biopsy,                                single or multiple                                , Moderate sedation services provided by                                the same physician or other  qualified health                                care professional performing a gastrointestinal                                endoscopic service that sedation supports,                                requiring the presence of an independent                                trained observer to assist in the monitoring of                                the patient's level of consciousness and                                physiological status; initial 15 minutes of                                intra-service time; patient age 5 years or                                older (additional time may be reported with                                09469, as appropriate)  Diagnosis Code(s):            --- Professional ---                                Z12.11, Encounter for screening for malignant                                neoplasm of colon                                D12.3, Benign neoplasm of transverse colon                                (hepatic flexure or splenic flexure)                                D12.4, Benign neoplasm of descending colon                                K64.8, Other hemorrhoids                                K57.30, Diverticulosis of large intestine                                without perforation or abscess without bleeding  CPT copyright 2017 [employer]. All rights reserved.  The codes documented in this report are preliminary and upon  review may  be revised to meet current compliance requirements.  Total Procedure Duration Time 0 hours 9 minutes 40 seconds  Scope In: 11:16:16 AM  Scope Out: 11:25:56 AM       [address] [address] [phone]   Labs and imaging personally reviewed. CBC WNL, BMP pertinent for hyponatremia to 132, elevated creatinine of 1.49 (baseline of 0.8), lactate 1.4, UA with proteinuria, large LE, >50WBCs, CXR with patchy bilateral lower lobe opacities which may represent atelectasis vs. PNA.                               12.0   9.78  )-----------( 230      ( 15 May 2018 19:17 )             38.4     05-15    132<L>  |  95<L>  |  24<H>  ----------------------------<  221<H>  4.2   |  26  |  1.49<H>    Ca    9.4      15 May 2018 22:13    TPro  7.1  /  Alb  3.8  /  TBili  0.6  /  DBili  x   /  AST  45<H>  /  ALT  18  /  AlkPhos  75  05-15    CAPILLARY BLOOD GLUCOSE          Urinalysis Basic - ( 15 May 2018 22:00 )    Color: YELLOW / Appearance: HAZY / S.017 / pH: 6.0  Gluc: NEGATIVE / Ketone: NEGATIVE  / Bili: NEGATIVE / Urobili: NORMAL mg/dL   Blood: TRACE / Protein: 300 mg/dL / Nitrite: NEGATIVE   Leuk Esterase: LARGE / RBC: 2-5 / WBC >50   Sq Epi: OCC / Non Sq Epi: x / Bacteria: x    < from: Xray Chest 1 View AP/PA (05.15.18 @ 20:20) >    ******PRELIMINARY REPORT******    ******PRELIMINARY REPORT******            EXAM:  XR CHEST AP OR PA 1V        PROCEDURE DATE:  May 15 2018     ******PRELIMINARY REPORT******    ******PRELIMINARY REPORT******            INTERPRETATION:  Patchy bilateral lower lobe opacities may represent   atelectasis and/or pneumonia. Small left pleural effusion.            ******PRELIMINARY REPORT******    ******PRELIMINARY REPORT******          BRYCE MENEZES M.D., RADIOLOGY RESIDENT    < end of copied text > Labs and imaging personally reviewed. CBC WNL, BMP pertinent for hyponatremia to 132, elevated creatinine of 1.49 (baseline of 0.8), lactate 1.4, UA with proteinuria, large LE, >50WBCs, CXR with patchy bilateral lower lobe opacities which may represent atelectasis vs. PNA.                         12.0   9.78  )-----------( 230      ( 15 May 2018 19:17 )             38.4     05-15    132<L>  |  95<L>  |  24<H>  ----------------------------<  221<H>  4.2   |  26  |  1.49<H>    Ca    9.4      15 May 2018 22:13    TPro  7.1  /  Alb  3.8  /  TBili  0.6  /  DBili  x   /  AST  45<H>  /  ALT  18  /  AlkPhos  75  05-15    CAPILLARY BLOOD GLUCOSE          Urinalysis Basic - ( 15 May 2018 22:00 )    Color: YELLOW / Appearance: HAZY / S.017 / pH: 6.0  Gluc: NEGATIVE / Ketone: NEGATIVE  / Bili: NEGATIVE / Urobili: NORMAL mg/dL   Blood: TRACE / Protein: 300 mg/dL / Nitrite: NEGATIVE   Leuk Esterase: LARGE / RBC: 2-5 / WBC >50   Sq Epi: OCC / Non Sq Epi: x / Bacteria: x    < from: Xray Chest 1 View AP/PA (05.15.18 @ 20:20) >    ******PRELIMINARY REPORT******    ******PRELIMINARY REPORT******            EXAM:  XR CHEST AP OR PA 1V        PROCEDURE DATE:  May 15 2018     ******PRELIMINARY REPORT******    ******PRELIMINARY REPORT******            INTERPRETATION:  Patchy bilateral lower lobe opacities may represent   atelectasis and/or pneumonia. Small left pleural effusion.            ******PRELIMINARY REPORT******    ******PRELIMINARY REPORT******          BRYCE MENEZES M.D., RADIOLOGY RESIDENT    < end of copied text >

## 2019-10-11 ENCOUNTER — TRANSCRIPTION ENCOUNTER (OUTPATIENT)
Age: 84
End: 2019-10-11

## 2019-10-11 DIAGNOSIS — R21 RASH AND OTHER NONSPECIFIC SKIN ERUPTION: ICD-10-CM

## 2019-10-11 RX ORDER — NYSTATIN 100000 1/G
100000 POWDER TOPICAL 3 TIMES DAILY
Qty: 1 | Refills: 3 | Status: ACTIVE | COMMUNITY
Start: 2019-10-11 | End: 1900-01-01

## 2019-10-11 RX ORDER — NYSTATIN 100000 [USP'U]/G
100000 CREAM TOPICAL 3 TIMES DAILY
Qty: 1 | Refills: 5 | Status: ACTIVE | COMMUNITY
Start: 2019-10-11 | End: 1900-01-01

## 2019-10-15 RX ORDER — BLOOD SUGAR DIAGNOSTIC
STRIP MISCELLANEOUS DAILY
Qty: 1 | Refills: 1 | Status: ACTIVE | COMMUNITY
Start: 2019-04-29 | End: 1900-01-01

## 2019-11-06 RX ORDER — PEN NEEDLE, DIABETIC 29 G X1/2"
32G X 4 MM NEEDLE, DISPOSABLE MISCELLANEOUS
Qty: 1 | Refills: 5 | Status: ACTIVE | COMMUNITY
Start: 2019-06-12 | End: 1900-01-01

## 2019-11-27 ENCOUNTER — APPOINTMENT (OUTPATIENT)
Dept: HOME HEALTH SERVICES | Facility: HOME HEALTH | Age: 84
End: 2019-11-27

## 2019-12-16 ENCOUNTER — RX RENEWAL (OUTPATIENT)
Age: 84
End: 2019-12-16

## 2019-12-18 ENCOUNTER — APPOINTMENT (OUTPATIENT)
Dept: HOME HEALTH SERVICES | Facility: HOME HEALTH | Age: 84
End: 2019-12-18
Payer: MEDICARE

## 2019-12-18 VITALS
WEIGHT: 156 LBS | BODY MASS INDEX: 26.63 KG/M2 | OXYGEN SATURATION: 95 % | SYSTOLIC BLOOD PRESSURE: 145 MMHG | HEIGHT: 64 IN | DIASTOLIC BLOOD PRESSURE: 92 MMHG | TEMPERATURE: 97.4 F | RESPIRATION RATE: 14 BRPM | HEART RATE: 64 BPM

## 2019-12-18 DIAGNOSIS — E11.69 TYPE 2 DIABETES MELLITUS WITH OTHER SPECIFIED COMPLICATION: ICD-10-CM

## 2019-12-18 DIAGNOSIS — I10 ESSENTIAL (PRIMARY) HYPERTENSION: ICD-10-CM

## 2019-12-18 DIAGNOSIS — E78.5 TYPE 2 DIABETES MELLITUS WITH OTHER SPECIFIED COMPLICATION: ICD-10-CM

## 2019-12-18 DIAGNOSIS — E87.1 HYPO-OSMOLALITY AND HYPONATREMIA: ICD-10-CM

## 2019-12-18 DIAGNOSIS — I12.9 TYPE 2 DIABETES MELLITUS WITH DIABETIC CHRONIC KIDNEY DISEASE: ICD-10-CM

## 2019-12-18 DIAGNOSIS — J45.30 MILD PERSISTENT ASTHMA, UNCOMPLICATED: ICD-10-CM

## 2019-12-18 DIAGNOSIS — E11.22 TYPE 2 DIABETES MELLITUS WITH DIABETIC CHRONIC KIDNEY DISEASE: ICD-10-CM

## 2019-12-18 DIAGNOSIS — M17.0 BILATERAL PRIMARY OSTEOARTHRITIS OF KNEE: ICD-10-CM

## 2019-12-18 DIAGNOSIS — H91.93 UNSPECIFIED HEARING LOSS, BILATERAL: ICD-10-CM

## 2019-12-18 DIAGNOSIS — K21.9 GASTRO-ESOPHAGEAL REFLUX DISEASE W/OUT ESOPHAGITIS: ICD-10-CM

## 2019-12-18 DIAGNOSIS — F01.51 VASCULAR DEMENTIA WITH BEHAVIORAL DISTURBANCE: ICD-10-CM

## 2019-12-18 DIAGNOSIS — I48.0 PAROXYSMAL ATRIAL FIBRILLATION: ICD-10-CM

## 2019-12-18 DIAGNOSIS — E46 UNSPECIFIED PROTEIN-CALORIE MALNUTRITION: ICD-10-CM

## 2019-12-18 DIAGNOSIS — N18.3 TYPE 2 DIABETES MELLITUS WITH DIABETIC CHRONIC KIDNEY DISEASE: ICD-10-CM

## 2019-12-18 DIAGNOSIS — H54.3 UNQUALIFIED VISUAL LOSS, BOTH EYES: ICD-10-CM

## 2019-12-18 PROCEDURE — 99350 HOME/RES VST EST HIGH MDM 60: CPT

## 2019-12-18 RX ORDER — NORMAL SALT TABLETS 1 G/G
1 TABLET ORAL
Qty: 12 | Refills: 3 | Status: ACTIVE | COMMUNITY
Start: 2019-05-31

## 2019-12-18 RX ORDER — LOSARTAN POTASSIUM 50 MG/1
50 TABLET, FILM COATED ORAL DAILY
Qty: 90 | Refills: 2 | Status: ACTIVE | COMMUNITY
Start: 2018-11-16

## 2019-12-18 RX ORDER — ASPIRIN 81 MG/1
81 TABLET, CHEWABLE ORAL
Qty: 90 | Refills: 3 | Status: ACTIVE | COMMUNITY
Start: 2019-02-13

## 2019-12-18 RX ORDER — MONTELUKAST 10 MG/1
10 TABLET, FILM COATED ORAL DAILY
Qty: 90 | Refills: 2 | Status: ACTIVE | COMMUNITY
Start: 2018-11-16

## 2019-12-18 RX ORDER — IPRATROPIUM BROMIDE AND ALBUTEROL SULFATE 2.5; .5 MG/3ML; MG/3ML
0.5-2.5 (3) SOLUTION RESPIRATORY (INHALATION)
Qty: 1 | Refills: 3 | Status: ACTIVE | COMMUNITY
Start: 2019-06-04

## 2019-12-18 RX ORDER — CIPROFLOXACIN HYDROCHLORIDE 500 MG/1
500 TABLET, FILM COATED ORAL
Qty: 20 | Refills: 0 | Status: DISCONTINUED | COMMUNITY
Start: 2019-08-24 | End: 2019-12-18

## 2019-12-18 RX ORDER — VITAMIN K2 90 MCG
125 MCG CAPSULE ORAL
Refills: 0 | Status: ACTIVE | COMMUNITY
Start: 2019-06-12

## 2019-12-18 RX ORDER — LEVOTHYROXINE SODIUM 0.15 MG/1
150 TABLET ORAL DAILY
Qty: 90 | Refills: 3 | Status: ACTIVE | COMMUNITY

## 2019-12-18 RX ORDER — PRAVASTATIN SODIUM 40 MG/1
40 TABLET ORAL
Qty: 90 | Refills: 3 | Status: ACTIVE | COMMUNITY
Start: 2018-11-16

## 2019-12-18 RX ORDER — SITAGLIPTIN 25 MG/1
25 TABLET, FILM COATED ORAL DAILY
Qty: 90 | Refills: 2 | Status: ACTIVE | COMMUNITY
Start: 2018-11-16

## 2019-12-18 RX ORDER — CARVEDILOL 6.25 MG/1
6.25 TABLET, FILM COATED ORAL TWICE DAILY
Qty: 180 | Refills: 3 | Status: ACTIVE | COMMUNITY
Start: 2018-11-16

## 2019-12-18 RX ORDER — FAMOTIDINE 20 MG/1
20 TABLET, FILM COATED ORAL DAILY
Qty: 90 | Refills: 2 | Status: ACTIVE | COMMUNITY
Start: 2018-11-16

## 2019-12-18 RX ORDER — BUPROPION HYDROCHLORIDE 100 MG/1
100 TABLET, FILM COATED, EXTENDED RELEASE ORAL TWICE DAILY
Qty: 60 | Refills: 1 | Status: DISCONTINUED | COMMUNITY
Start: 2019-08-26 | End: 2019-12-18

## 2019-12-18 RX ORDER — AMLODIPINE BESYLATE 10 MG/1
10 TABLET ORAL DAILY
Qty: 90 | Refills: 3 | Status: ACTIVE | COMMUNITY
Start: 2018-11-16

## 2019-12-18 NOTE — PHYSICAL EXAM
[No Acute Distress] : no acute distress [Well Nourished] : well nourished [Well Developed] : well developed [Normal Sclera/Conjunctiva] : normal sclera/conjunctiva [EOMI] : extra ocular movement intact [Normal Oropharynx] : the oropharynx was normal [Normal Outer Ear/Nose] : the ears and nose were normal in appearance [No JVD] : no jugular venous distention [No Accessory Muscle Use] : no accessory muscle use [No Respiratory Distress] : no respiratory distress [Clear to Auscultation] : lungs were clear to auscultation bilaterally [Normal Rate] : heart rate was normal  [Regular Rhythm] : with a regular rhythm [No Murmurs] : no murmurs heard [Normal S1, S2] : normal S1 and S2 [Non Tender] : non-tender [Normal Bowel Sounds] : normal bowel sounds [No Edema] : there was no peripheral edema [Soft] : abdomen soft [Normal Post Cervical Nodes] : no posterior cervical lymphadenopathy [No Masses] : no abdominal mass palpated [Not Distended] : not distended [Normal Anterior Cervical Nodes] : no anterior cervical lymphadenopathy [No CVA Tenderness] : no ~M costovertebral angle tenderness [Normal Gait] : normal gait [No Spinal Tenderness] : no spinal tenderness [Normal Affect] : the affect was normal [Normal Strength/Tone] : muscle strength and tone were normal [No Rash] : no rash [de-identified] : non-verbal today  [de-identified] : non-verbal - unable to assess

## 2019-12-18 NOTE — REASON FOR VISIT
[Follow-Up] : a follow-up visit [Formal Caregiver] : formal caregiver [Family Member] : family member [Pre-Visit Preparation] : pre-visit preparation was done [Intercurrent Specialty/Sub-specialty Visits] : the patient has no intercurrent specialty/sub-specialty visits [FreeTextEntry1] : for chronic conditions

## 2019-12-18 NOTE — REVIEW OF SYSTEMS
[Fatigue] : fatigue [Muscle Weakness] : muscle weakness [Memory Loss] : memory loss [Negative] : Psychiatric [FreeTextEntry4] : wax build up [de-identified] : as noted in HPI

## 2019-12-18 NOTE — HISTORY OF PRESENT ILLNESS
[Patient] : patient [Family Member] : family member [Formal Caregiver] : formal caregiver [FreeTextEntry1] : debility from h/o CVAs [FreeTextEntry2] : PMH:  type 2 diabetes with hyperlipidemia, hypertension, asthma, Hypothyroid, GERD, atrial fibrillation, (L) MCA stroke (1018), situational depression.\par \par Patient accompanied by aide, spoke with daughter on the phone.\par No interim events. \par Continues to have poor appetite. \par Was started on escitalopram since last visit by her son who is a cardiologist. \par Getting tylenol 1 g twice daily for arthritis pains with fairly good effect. \par Has continued bilateral hand swelling which is controlled with compression. \par Taking Sodium Chloride 1 g twice weekly. \par Gets Levemir 3 units at night. Daughter reports glucoses are good.\par Daughter denies skin break down with barrier cream. \par Bowels are regular. \par No recent breathing issues.

## 2019-12-18 NOTE — COUNSELING
[Minimize unnecessary interventions] : minimize unnecessary interventions [Maintain functional ability] : maintain functional ability [Full Code] : Code Status: Full Code [Limited] : Treatment Guidelines: Limited [Trial of Bipap] : Intubation: Trial of Bipap [[facility]: _____[facility]/last verification date: [unfilled] [Non - Smoker] : non-smoker [Not Indicated] : not indicated [de-identified] : family wants to discuss MOLST

## 2019-12-18 NOTE — HEALTH RISK ASSESSMENT
[Full assistance needed] : managing medications [Patient not ambulatory (Wheelchair)] : Patient is not ambulatory (Wheelchair) [Yes] : The patient does have visual impairment [HRA Reviewed] : Health risk assessments reviewed [TimeGetUpGo] : 0

## 2019-12-20 LAB
ALBUMIN SERPL ELPH-MCNC: 3.2 G/DL
ALP BLD-CCNC: 67 U/L
ALT SERPL-CCNC: 12 U/L
ANION GAP SERPL CALC-SCNC: 12 MMOL/L
AST SERPL-CCNC: 16 U/L
BASOPHILS # BLD AUTO: 0.04 K/UL
BASOPHILS NFR BLD AUTO: 0.4 %
BILIRUB SERPL-MCNC: 0.4 MG/DL
BUN SERPL-MCNC: 27 MG/DL
CALCIUM SERPL-MCNC: 9.2 MG/DL
CHLORIDE SERPL-SCNC: 102 MMOL/L
CO2 SERPL-SCNC: 26 MMOL/L
CREAT SERPL-MCNC: 1.35 MG/DL
EOSINOPHIL # BLD AUTO: 0.18 K/UL
EOSINOPHIL NFR BLD AUTO: 1.9 %
ESTIMATED AVERAGE GLUCOSE: 151 MG/DL
HBA1C MFR BLD HPLC: 6.9 %
HCT VFR BLD CALC: 36 %
HGB BLD-MCNC: 10.9 G/DL
IMM GRANULOCYTES NFR BLD AUTO: 0.3 %
LYMPHOCYTES # BLD AUTO: 3.47 K/UL
LYMPHOCYTES NFR BLD AUTO: 36.7 %
MAN DIFF?: NORMAL
MCHC RBC-ENTMCNC: 25.6 PG
MCHC RBC-ENTMCNC: 30.3 GM/DL
MCV RBC AUTO: 84.5 FL
MONOCYTES # BLD AUTO: 0.89 K/UL
MONOCYTES NFR BLD AUTO: 9.4 %
NEUTROPHILS # BLD AUTO: 4.84 K/UL
NEUTROPHILS NFR BLD AUTO: 51.3 %
PLATELET # BLD AUTO: 214 K/UL
POTASSIUM SERPL-SCNC: 4.6 MMOL/L
PREALB SERPL NEPH-MCNC: 22 MG/DL
PROT SERPL-MCNC: 5.5 G/DL
RBC # BLD: 4.26 M/UL
RBC # FLD: 16.3 %
SODIUM SERPL-SCNC: 140 MMOL/L
TSH SERPL-ACNC: 1.29 UIU/ML
WBC # FLD AUTO: 9.45 K/UL

## 2020-01-21 RX ORDER — INSULIN DETEMIR 100 [IU]/ML
100 INJECTION, SOLUTION SUBCUTANEOUS
Qty: 3 | Refills: 3 | Status: ACTIVE | COMMUNITY
Start: 2019-06-12 | End: 1900-01-01

## 2020-01-23 ENCOUNTER — OTHER (OUTPATIENT)
Age: 85
End: 2020-01-23

## 2020-01-23 ENCOUNTER — INPATIENT (INPATIENT)
Facility: HOSPITAL | Age: 85
LOS: 0 days | End: 2020-01-23
Attending: INTERNAL MEDICINE | Admitting: INTERNAL MEDICINE
Payer: COMMERCIAL

## 2020-01-23 VITALS
OXYGEN SATURATION: 100 % | HEART RATE: 49 BPM | HEIGHT: 65 IN | DIASTOLIC BLOOD PRESSURE: 60 MMHG | WEIGHT: 190.04 LBS | SYSTOLIC BLOOD PRESSURE: 106 MMHG | RESPIRATION RATE: 15 BRPM

## 2020-01-23 LAB
ALBUMIN SERPL ELPH-MCNC: 1.7 G/DL — LOW (ref 3.3–5)
ALP SERPL-CCNC: 107 U/L — SIGNIFICANT CHANGE UP (ref 40–120)
ALT FLD-CCNC: 125 U/L — HIGH (ref 12–78)
ANION GAP SERPL CALC-SCNC: 15 MMOL/L — SIGNIFICANT CHANGE UP (ref 5–17)
ANISOCYTOSIS BLD QL: SLIGHT — SIGNIFICANT CHANGE UP
APPEARANCE UR: ABNORMAL
APTT BLD: 30.4 SEC — SIGNIFICANT CHANGE UP (ref 27.5–36.3)
APTT BLD: 37.8 SEC — HIGH (ref 27.5–36.3)
AST SERPL-CCNC: 266 U/L — HIGH (ref 15–37)
BACTERIA # UR AUTO: ABNORMAL
BASOPHILS # BLD AUTO: 0 K/UL — SIGNIFICANT CHANGE UP (ref 0–0.2)
BASOPHILS NFR BLD AUTO: 0 % — SIGNIFICANT CHANGE UP (ref 0–2)
BILIRUB SERPL-MCNC: 0.4 MG/DL — SIGNIFICANT CHANGE UP (ref 0.2–1.2)
BILIRUB UR-MCNC: NEGATIVE — SIGNIFICANT CHANGE UP
BUN SERPL-MCNC: 47 MG/DL — HIGH (ref 7–23)
CALCIUM SERPL-MCNC: 8.6 MG/DL — SIGNIFICANT CHANGE UP (ref 8.5–10.1)
CHLORIDE SERPL-SCNC: 133 MMOL/L — HIGH (ref 96–108)
CO2 SERPL-SCNC: 18 MMOL/L — LOW (ref 22–31)
COLOR SPEC: YELLOW — SIGNIFICANT CHANGE UP
COMMENT - URINE: SIGNIFICANT CHANGE UP
CREAT SERPL-MCNC: 2.17 MG/DL — HIGH (ref 0.5–1.3)
DIFF PNL FLD: ABNORMAL
EOSINOPHIL # BLD AUTO: 0.23 K/UL — SIGNIFICANT CHANGE UP (ref 0–0.5)
EOSINOPHIL NFR BLD AUTO: 1 % — SIGNIFICANT CHANGE UP (ref 0–6)
EPI CELLS # UR: SIGNIFICANT CHANGE UP
FLU A RESULT: SIGNIFICANT CHANGE UP
FLU A RESULT: SIGNIFICANT CHANGE UP
FLUAV AG NPH QL: SIGNIFICANT CHANGE UP
FLUBV AG NPH QL: SIGNIFICANT CHANGE UP
GLUCOSE BLDC GLUCOMTR-MCNC: 187 MG/DL — HIGH (ref 70–99)
GLUCOSE SERPL-MCNC: 229 MG/DL — HIGH (ref 70–99)
GLUCOSE UR QL: NEGATIVE MG/DL — SIGNIFICANT CHANGE UP
HCT VFR BLD CALC: 32.8 % — LOW (ref 34.5–45)
HGB BLD-MCNC: 9.2 G/DL — LOW (ref 11.5–15.5)
HYALINE CASTS # UR AUTO: ABNORMAL /LPF
HYPOCHROMIA BLD QL: SLIGHT — SIGNIFICANT CHANGE UP
INR BLD: 1.1 RATIO — SIGNIFICANT CHANGE UP (ref 0.88–1.16)
INR BLD: 1.13 RATIO — SIGNIFICANT CHANGE UP (ref 0.88–1.16)
KETONES UR-MCNC: NEGATIVE — SIGNIFICANT CHANGE UP
LEUKOCYTE ESTERASE UR-ACNC: ABNORMAL
LYMPHOCYTES # BLD AUTO: 12.29 K/UL — HIGH (ref 1–3.3)
LYMPHOCYTES # BLD AUTO: 53 % — HIGH (ref 13–44)
MACROCYTES BLD QL: SLIGHT — SIGNIFICANT CHANGE UP
MAGNESIUM SERPL-MCNC: 2.9 MG/DL — HIGH (ref 1.6–2.6)
MANUAL SMEAR VERIFICATION: SIGNIFICANT CHANGE UP
MCHC RBC-ENTMCNC: 25.6 PG — LOW (ref 27–34)
MCHC RBC-ENTMCNC: 28 GM/DL — LOW (ref 32–36)
MCV RBC AUTO: 91.1 FL — SIGNIFICANT CHANGE UP (ref 80–100)
MONOCYTES # BLD AUTO: 0.23 K/UL — SIGNIFICANT CHANGE UP (ref 0–0.9)
MONOCYTES NFR BLD AUTO: 1 % — LOW (ref 2–14)
NEUTROPHILS # BLD AUTO: 10.43 K/UL — HIGH (ref 1.8–7.4)
NEUTROPHILS NFR BLD AUTO: 45 % — SIGNIFICANT CHANGE UP (ref 43–77)
NITRITE UR-MCNC: POSITIVE
NRBC # BLD: 0 /100 — SIGNIFICANT CHANGE UP (ref 0–0)
NRBC # BLD: SIGNIFICANT CHANGE UP /100 WBCS (ref 0–0)
PH UR: 5 — SIGNIFICANT CHANGE UP (ref 5–8)
PHOSPHATE SERPL-MCNC: 6.1 MG/DL — HIGH (ref 2.5–4.5)
PLAT MORPH BLD: NORMAL — SIGNIFICANT CHANGE UP
PLATELET # BLD AUTO: 132 K/UL — LOW (ref 150–400)
POIKILOCYTOSIS BLD QL AUTO: SLIGHT — SIGNIFICANT CHANGE UP
POTASSIUM SERPL-MCNC: 4.9 MMOL/L — SIGNIFICANT CHANGE UP (ref 3.5–5.3)
POTASSIUM SERPL-SCNC: 4.9 MMOL/L — SIGNIFICANT CHANGE UP (ref 3.5–5.3)
PROT SERPL-MCNC: 4.8 GM/DL — LOW (ref 6–8.3)
PROT UR-MCNC: 500 MG/DL
PROTHROM AB SERPL-ACNC: 12.4 SEC — SIGNIFICANT CHANGE UP (ref 10–12.9)
PROTHROM AB SERPL-ACNC: 12.7 SEC — SIGNIFICANT CHANGE UP (ref 10–12.9)
RBC # BLD: 3.6 M/UL — LOW (ref 3.8–5.2)
RBC # FLD: 17.6 % — HIGH (ref 10.3–14.5)
RBC BLD AUTO: SIGNIFICANT CHANGE UP
RBC CASTS # UR COMP ASSIST: ABNORMAL /HPF (ref 0–4)
RSV RESULT: DETECTED
RSV RNA RESP QL NAA+PROBE: DETECTED
SCHISTOCYTES BLD QL AUTO: SLIGHT — SIGNIFICANT CHANGE UP
SODIUM SERPL-SCNC: 166 MMOL/L — CRITICAL HIGH (ref 135–145)
SP GR SPEC: 1.01 — SIGNIFICANT CHANGE UP (ref 1.01–1.02)
UROBILINOGEN FLD QL: NEGATIVE MG/DL — SIGNIFICANT CHANGE UP
WBC # BLD: 23.18 K/UL — HIGH (ref 3.8–10.5)
WBC # FLD AUTO: 23.18 K/UL — HIGH (ref 3.8–10.5)
WBC UR QL: ABNORMAL

## 2020-01-23 PROCEDURE — 99291 CRITICAL CARE FIRST HOUR: CPT

## 2020-01-23 PROCEDURE — 93010 ELECTROCARDIOGRAM REPORT: CPT

## 2020-01-23 PROCEDURE — 71045 X-RAY EXAM CHEST 1 VIEW: CPT | Mod: 26

## 2020-01-23 RX ORDER — MORPHINE SULFATE 50 MG/1
4 CAPSULE, EXTENDED RELEASE ORAL ONCE
Refills: 0 | Status: DISCONTINUED | OUTPATIENT
Start: 2020-01-23 | End: 2020-01-23

## 2020-01-23 RX ORDER — VANCOMYCIN HCL 1 G
1000 VIAL (EA) INTRAVENOUS ONCE
Refills: 0 | Status: COMPLETED | OUTPATIENT
Start: 2020-01-23 | End: 2020-01-23

## 2020-01-23 RX ORDER — PIPERACILLIN AND TAZOBACTAM 4; .5 G/20ML; G/20ML
3.38 INJECTION, POWDER, LYOPHILIZED, FOR SOLUTION INTRAVENOUS ONCE
Refills: 0 | Status: COMPLETED | OUTPATIENT
Start: 2020-01-23 | End: 2020-01-23

## 2020-01-23 RX ORDER — CHLORHEXIDINE GLUCONATE 213 G/1000ML
15 SOLUTION TOPICAL EVERY 12 HOURS
Refills: 0 | Status: DISCONTINUED | OUTPATIENT
Start: 2020-01-23 | End: 2020-01-23

## 2020-01-23 RX ORDER — MORPHINE SULFATE 50 MG/1
2 CAPSULE, EXTENDED RELEASE ORAL EVERY 4 HOURS
Refills: 0 | Status: DISCONTINUED | OUTPATIENT
Start: 2020-01-23 | End: 2020-01-23

## 2020-01-23 RX ORDER — NOREPINEPHRINE BITARTRATE/D5W 8 MG/250ML
0.05 PLASTIC BAG, INJECTION (ML) INTRAVENOUS
Qty: 8 | Refills: 0 | Status: DISCONTINUED | OUTPATIENT
Start: 2020-01-23 | End: 2020-01-23

## 2020-01-23 RX ADMIN — MORPHINE SULFATE 4 MILLIGRAM(S): 50 CAPSULE, EXTENDED RELEASE ORAL at 16:02

## 2020-01-23 RX ADMIN — Medication 8.08 MICROGRAM(S)/KG/MIN: at 11:38

## 2020-01-23 RX ADMIN — Medication 250 MILLIGRAM(S): at 13:34

## 2020-01-23 RX ADMIN — PIPERACILLIN AND TAZOBACTAM 200 GRAM(S): 4; .5 INJECTION, POWDER, LYOPHILIZED, FOR SOLUTION INTRAVENOUS at 13:01

## 2020-01-23 RX ADMIN — MORPHINE SULFATE 4 MILLIGRAM(S): 50 CAPSULE, EXTENDED RELEASE ORAL at 15:57

## 2020-01-23 NOTE — ED PROVIDER NOTE - OBJECTIVE STATEMENT
As per EMS and family; patient suddenly passed out while eating at home today around 0930. Patient's son immediately performed CPR; EMS arrived at 0943 and found patient in asystole; patient was intubated in the field and given epinephrine x 2 which led to ROSC approximately 20 mins later after initial assesment

## 2020-01-23 NOTE — H&P ADULT - ASSESSMENT
89 y/o F s/p cardiac arrest x3 with ROSCx3 admitted to ICU intubated.   - Son (HCP) signed MOLST. Pt now DNR  - Family wishes to palliatively extubate patient. No blood draws, wean down pressors, and no invasive procedures.    Pt seen and discussed with ICU attending Dr. Beckwith

## 2020-01-23 NOTE — ED PROVIDER NOTE - CRITICAL CARE PROVIDED
direct patient care (not related to procedure)/documentation/conducted a detailed discussion of DNR status/consult w/ pt's family directly relating to pts condition/additional history taking/interpretation of diagnostic studies/consultation with other physicians

## 2020-01-23 NOTE — H&P ADULT - HISTORY OF PRESENT ILLNESS
89 yr old F PMHx asthma, Afib, DM, HLD, HTN, hypothyroidism presented to the ED after pt found to have passed out while eating at 0930 while at home. Pt's son states he noticed pt to be pulseless and began CPR. EMS found pt to be in asystole and pt was intubated in the field and given epix2 with ?shockx2 which led to ROSC ~20 mins later. Once in ED, pt had subsequent cardiac arrest with ROSC at 1022 and cardiac arrest once more thereafter with ROSC at 1058. Pts son signed MOLST for patient to be DNR thereafter.

## 2020-01-23 NOTE — ED PROVIDER NOTE - CARE PLAN
Principal Discharge DX:	Cardiopulmonary arrest with successful resuscitation  Secondary Diagnosis:	Pneumonia

## 2020-01-23 NOTE — ED PROVIDER NOTE - PROGRESS NOTE DETAILS
Patient noted to not have pulses; ACLS initiated which led to ROSC at 1022 Patient to be pulseless again; ACLS initiated ROSC at 1058; Levophed given Patient son who is health care proxy made aware of grave prognosis; MOLST signed

## 2020-01-23 NOTE — ED ADULT NURSE NOTE - OBJECTIVE STATEMENT
Patient received post arrest, intubated with NS IVF infusing bolus. Atropine 1 mg administered, HR 35. MD Pugh at bedside.

## 2020-01-23 NOTE — H&P ADULT - NSHPPHYSICALEXAM_GEN_ALL_CORE
PHYSICAL EXAM:  GENERAL: intubated, unarousable  HEAD:  NC/AC  EYES: EOMI, PERRLA, conjunctiva and sclera clear  NECK: Supple, No JVD  CHEST/LUNG: CTAB. No cough, wheeze, rales.   HEART: Regular rate and rhythm. No murmurs, rubs, or gallops.   ABDOMEN: Soft, Nondistended. Unable to assess tenderness. Bowel sounds present  EXTREMITIES:  2+ Peripheral Pulses, No clubbing, cyanosis, or edema  NEUROLOGY: Not moving any extremities spontaneously. Not following commands. Unarousable. Not withdrawing. Pupils dilated and not reactive to light.   SKIN: No rashes or lesions

## 2020-01-23 NOTE — H&P ADULT - ATTENDING COMMENTS
89 yo F hx dementia and dysphagia /w cardiac x3 episodes. Unclear event but may be aspiration pneumonia vs viral respiratory infection and secondary PNA (RSV+). Pt intubated and in shock requiring significant vasopressor support. Not on sedation but is not responsive and has limited brainstem activity so far. Family discussion had and family requesting comfort care measures. Pt brought to ICU and palliative extubation done as per family request and all questions answered.

## 2020-01-23 NOTE — ED ADULT NURSE REASSESSMENT NOTE - NS ED NURSE REASSESS COMMENT FT1
Received patient from DAMIEN Keller, patient intubated, patient family by the bedside. DNR status confirmed per Dr. Pugh. Will continue to monitor patient closely.

## 2020-01-23 NOTE — ED ADULT TRIAGE NOTE - CHIEF COMPLAINT QUOTE
pt is post cardiac arrest, Md garcia at bedside, pt given epix2 and IVF, rosc @10, code continuing in bed 4

## 2020-01-23 NOTE — H&P ADULT - NSHPLABSRESULTS_GEN_ALL_CORE
.  LABS:                         9.2     )-----------( 132      ( 2020 10:57 )             32.8         166<HH>  |  133<H>  |  47<H>  ----------------------------<  229<H>  4.9   |  18<L>  |  2.17<H>    Ca    8.6      2020 11:28  Phos  6.1       Mg     2.9         TPro  4.8<L>  /  Alb  1.7<L>  /  TBili  0.4  /  DBili  x   /  AST  266<H>  /  ALT  125<H>  /  AlkPhos  107      PT/INR - ( 2020 11:28 )   PT: 12.7 sec;   INR: 1.13 ratio         PTT - ( 2020 11:28 )  PTT:37.8 sec  Urinalysis Basic - ( 2020 12:29 )    Color: Yellow / Appearance: Slightly Turbid / S.010 / pH: x  Gluc: x / Ketone: Negative  / Bili: Negative / Urobili: Negative mg/dL   Blood: x / Protein: 500 mg/dL / Nitrite: Positive   Leuk Esterase: Moderate / RBC: 3-5 /HPF / WBC 11-25   Sq Epi: x / Non Sq Epi: Few / Bacteria: Many    RADIOLOGY, EKG & ADDITIONAL TESTS: Reviewed.

## 2020-01-24 VITALS — TEMPERATURE: 101 F

## 2020-01-24 LAB
GRAM STN FLD: SIGNIFICANT CHANGE UP
METHOD TYPE: SIGNIFICANT CHANGE UP
MRSA SPEC QL CULT: SIGNIFICANT CHANGE UP
SPECIMEN SOURCE: SIGNIFICANT CHANGE UP

## 2020-01-26 LAB
-  AMPICILLIN/SULBACTAM: SIGNIFICANT CHANGE UP
-  CEFAZOLIN: SIGNIFICANT CHANGE UP
-  CLINDAMYCIN: SIGNIFICANT CHANGE UP
-  DAPTOMYCIN: SIGNIFICANT CHANGE UP
-  ERYTHROMYCIN: SIGNIFICANT CHANGE UP
-  GENTAMICIN: SIGNIFICANT CHANGE UP
-  LINEZOLID: SIGNIFICANT CHANGE UP
-  OXACILLIN: SIGNIFICANT CHANGE UP
-  PENICILLIN: SIGNIFICANT CHANGE UP
-  RIFAMPIN: SIGNIFICANT CHANGE UP
-  TETRACYCLINE: SIGNIFICANT CHANGE UP
-  TRIMETHOPRIM/SULFAMETHOXAZOLE: SIGNIFICANT CHANGE UP
-  VANCOMYCIN: SIGNIFICANT CHANGE UP
CULTURE RESULTS: SIGNIFICANT CHANGE UP
GRAM STN FLD: SIGNIFICANT CHANGE UP
METHOD TYPE: SIGNIFICANT CHANGE UP
ORGANISM # SPEC MICROSCOPIC CNT: SIGNIFICANT CHANGE UP
SPECIMEN SOURCE: SIGNIFICANT CHANGE UP

## 2020-01-28 DIAGNOSIS — E78.5 HYPERLIPIDEMIA, UNSPECIFIED: ICD-10-CM

## 2020-01-28 DIAGNOSIS — I46.9 CARDIAC ARREST, CAUSE UNSPECIFIED: ICD-10-CM

## 2020-01-28 DIAGNOSIS — Z51.5 ENCOUNTER FOR PALLIATIVE CARE: ICD-10-CM

## 2020-01-28 DIAGNOSIS — I48.91 UNSPECIFIED ATRIAL FIBRILLATION: ICD-10-CM

## 2020-01-28 DIAGNOSIS — J12.1 RESPIRATORY SYNCYTIAL VIRUS PNEUMONIA: ICD-10-CM

## 2020-01-28 DIAGNOSIS — E11.9 TYPE 2 DIABETES MELLITUS WITHOUT COMPLICATIONS: ICD-10-CM

## 2020-01-28 DIAGNOSIS — Z66 DO NOT RESUSCITATE: ICD-10-CM

## 2020-01-28 DIAGNOSIS — E03.9 HYPOTHYROIDISM, UNSPECIFIED: ICD-10-CM

## 2020-01-28 DIAGNOSIS — J45.909 UNSPECIFIED ASTHMA, UNCOMPLICATED: ICD-10-CM

## 2020-01-28 DIAGNOSIS — J69.0 PNEUMONITIS DUE TO INHALATION OF FOOD AND VOMIT: ICD-10-CM

## 2020-01-28 DIAGNOSIS — Z79.82 LONG TERM (CURRENT) USE OF ASPIRIN: ICD-10-CM

## 2020-01-28 DIAGNOSIS — I10 ESSENTIAL (PRIMARY) HYPERTENSION: ICD-10-CM

## 2020-01-28 DIAGNOSIS — Z79.899 OTHER LONG TERM (CURRENT) DRUG THERAPY: ICD-10-CM

## 2020-01-28 DIAGNOSIS — Z79.1 LONG TERM (CURRENT) USE OF NON-STEROIDAL ANTI-INFLAMMATORIES (NSAID): ICD-10-CM

## 2020-01-28 DIAGNOSIS — Z79.4 LONG TERM (CURRENT) USE OF INSULIN: ICD-10-CM

## 2020-01-28 LAB
CULTURE RESULTS: SIGNIFICANT CHANGE UP
SPECIMEN SOURCE: SIGNIFICANT CHANGE UP

## 2020-02-05 NOTE — DISCHARGE NOTE PROVIDER - NSDCQMCOGNITION_NEU_ALL_CORE
PROCEDURES:  Total left knee replacement 05-Feb-2020 11:35:49  Ernesto Hodgson
Difficulty concentrating/Difficulty making decisions/Difficulty remembering

## 2020-02-17 NOTE — PROGRESS NOTE ADULT - PROBLEM SELECTOR PLAN 5
Tong Avelar(Attending) - Patient seen in PMD's office for osteoarthritis which affects her shoulders, knees, and back.  - patient states she does not get relief from tylenol, but only uses it  once a day    - continue with Tylenol PRN q6H for pain  - Given HIREN and history of bleeding, would avoid NSAIDs, avoid sedating pain medication given age

## 2020-09-09 NOTE — PATIENT PROFILE ADULT. - HARM RISK FACTORS
PRINCIPAL DISCHARGE DIAGNOSIS  Diagnosis: NSTEMI (non-ST elevated myocardial infarction)  Assessment and Plan of Treatment: Your ECG showed inf/lateral ischemic changes consitent with STEMI. You were seen by cardiology for STEMI and underwent cath on 9/7 with a MANASA (stent) placed on the RCA. Please continue to take  Aspirin 81mg daily, Toprol 25mg PO QHS, Losartan 50mg daily, Atorvastatin 80mg PO QHS and Prasugrel 10 mg PO QD with outpatient follow up Dr. Bernard. Discontinue Verapamil.   Also, do not take Metformin until 9/10/2020        SECONDARY DISCHARGE DIAGNOSES  Diagnosis: Abdominal pain  Assessment and Plan of Treatment: PRINCIPAL DISCHARGE DIAGNOSIS  Diagnosis: NSTEMI (non-ST elevated myocardial infarction)  Assessment and Plan of Treatment: Your ECG showed inf/lateral ischemic changes. You were seen by cardiology for NSTEMI and underwent cath on 9/7 with a MANASA (stent) placed on the RCA. Please continue to take  Aspirin 81mg daily, Toprol 25mg PO QHS, Losartan 50mg daily, Atorvastatin 80mg PO QHS and Prasugrel 10 mg PO QD with outpatient follow up Dr. Bernard. Discontinue Verapamil.   Also, do not take Metformin until 9/10/2020 PRINCIPAL DISCHARGE DIAGNOSIS  Diagnosis: NSTEMI (non-ST elevated myocardial infarction)  Assessment and Plan of Treatment: Your ECG showed inf/lateral ischemic changes. You were seen by cardiology for NSTEMI and underwent cath on 9/7 with a MANASA (stent) placed on the RCA. Please continue to take  Aspirin 81mg daily, Toprol 25mg PO QHS, Losartan 25mg daily, Atorvastatin 80mg PO QHS and Prasugrel 10 mg PO QD with outpatient follow up Dr. Bernard. Discontinue Verapamil.   Also, do not take Metformin until 9/10/2020 yes

## 2020-10-05 NOTE — DISCHARGE NOTE PROVIDER - NSDCCPCAREPLAN_GEN_ALL_CORE_FT
Per PCP:  Ok for tel visit, needs labs done before further refills.    PRINCIPAL DISCHARGE DIAGNOSIS  Diagnosis: Metabolic encephalopathy  Assessment and Plan of Treatment: Metabolic encephalopathy      SECONDARY DISCHARGE DIAGNOSES  Diagnosis: Essential hypertension  Assessment and Plan of Treatment:     Diagnosis: Afib  Assessment and Plan of Treatment:     Diagnosis: CKD (chronic kidney disease) stage 3, GFR 30-59 ml/min  Assessment and Plan of Treatment:     Diagnosis: Functional quadriplegia  Assessment and Plan of Treatment: Functional quadriplegia    Diagnosis: Acute cystitis without hematuria  Assessment and Plan of Treatment: Acute cystitis without hematuria    Diagnosis: Hypoglycemia  Assessment and Plan of Treatment: PRINCIPAL DISCHARGE DIAGNOSIS  Diagnosis: Metabolic encephalopathy  Assessment and Plan of Treatment: Metabolic encephalopathy      SECONDARY DISCHARGE DIAGNOSES  Diagnosis: Hypothyroid  Assessment and Plan of Treatment:     Diagnosis: Essential hypertension  Assessment and Plan of Treatment:     Diagnosis: Afib  Assessment and Plan of Treatment:     Diagnosis: CKD (chronic kidney disease) stage 3, GFR 30-59 ml/min  Assessment and Plan of Treatment:     Diagnosis: Functional quadriplegia  Assessment and Plan of Treatment: Functional quadriplegia    Diagnosis: Acute cystitis without hematuria  Assessment and Plan of Treatment: Acute cystitis without hematuria    Diagnosis: Hypoglycemia  Assessment and Plan of Treatment:

## 2021-03-26 NOTE — ED PROVIDER NOTE - OTHER FINDINGS
Odomzo Pregnancy And Lactation Text: This medication is Pregnancy Category X and is absolutely contraindicated during pregnancy. It is unknown if it is excreted in breast milk. disagree w/ acc junct read

## 2021-07-25 NOTE — ED ADULT NURSE NOTE - CHIEF COMPLAINT QUOTE
pt is post cardiac arrest, Md garcia at bedside, pt given epix2 and IVF, rosc @10, code continuing in bed 4
full range of motion in all extremities

## 2021-10-06 PROBLEM — I10 ESSENTIAL HYPERTENSION: Status: ACTIVE | Noted: 2018-11-15

## 2022-02-07 NOTE — ED PROVIDER NOTE - TIMING
HISTORY OF PRESENT ILLNESS:    Ms. Cristian See present today for a post op breast check. She is status post bilateral implant exchange on 12/28/2021. She had placement of bilateral [id number] cohesive gel implants    She underwent a Right ultrasound guided breast biopsy. Pathology demonstrated intraductal papilloma with atypia. She has appropriate postoperative discomfort, and no significant complaints. This is on the same side as her right breast cancer treated with lumpectomy and radiation in 2008. She has had enough and wishes to consider bilateral mastectomies and reconstruction      She is now status post  Bilateral mastectomy and immediate reconstruction with expanders and AlloDerm on 9/14/2021    PATHOLOGY REVEALS:  FINAL DIAGNOSIS:    A. Right breast, mastectomy:   Ductal carcinoma in situ, papillary and solid subtype. Maximum tumor diameter is 0.6 cm. The surgical margins and nipple are free of tumor. B.  Left breast, mastectomy:   No malignancy identified. Benign breast tissue. The surgical margins are free of tumor. She called last weekend concerned about swelling and pain in the left breast and I had her come in for evaluation. I think it was just the block wearing off    PHYSICAL EXAM:  The  wounds look good with no evidence of infection, fluid accumulation, or skin necrosis. The right side still rides a little high but it better than anticipated. IMPRESSION:    Doing well s/p bilateral implant exchange 12/28/2021    PLAN:   I will see her back in 1 month     ADDENDUM: 1/7/2022     She called with some concern about some redness on the right. I examined her and op care one. It looks more like rubbing from her bra but I did start her on Augmentin and we will see her in the office on Monday    Today she comes in after 3 days of Augmentin. The redness is much improved. Ultrasound in the office demonstrates some fluid laterally.   We attempted aspiration and appeared to be all old blood. There is no evidence of pus. ADDENDUM: 1/19/2022  She is much more red and angry today and we will have her get an infusion of Orbactiv and see her back in one week. 1/24/2022  She is less red today but is very tight. Office ultrasound did not demonstrate any fluid around her implants. She appears to be having some serous drainage. PLAN: Add Bactroban 3 times daily alternating with her Nitro-Bid  Use sterile gauze for the drainage. And follow-up in 1 week    ADDENDUM: 2/7/2022  She is much improved. There is no redness no swelling things are little softer and no further drainage. The dose of Orbactiv seems to have worked quite well. Follow-up in 1 month. sudden onset

## 2022-08-04 NOTE — ED ADULT NURSE NOTE - NS ED NOTE ABUSE SUSPICION NEGLECT YN
No Orientation to room/Bed in low position, brakes on/Side rails x 2 or 4 up, assess large gaps, such that a patient could get extremity or other body part entrapped, use additional safety procedures/Assess eliminations need, assist as needed/Call light is within reach, educate patient/family on its functionality

## 2022-09-16 NOTE — ED ADULT NURSE NOTE - CCCP TRG CHIEF CMPLNT
General Adult HPI





- General


Chief complaint: Shortness of Breath


Stated complaint: SOB


Time Seen by Provider: 09/16/22 19:46


Source: patient, family


Mode of arrival: ambulatory


Limitations: no limitations





- History of Present Illness


Initial comments: 


Dictation was produced using dragon dictation software. please excuse any 

grammatical, word or spelling errors. 











Chief Complaint: 5-year-old female presenting with respiratory distress





History of Present Illness: Patient is a 5-year-old female redirected to the 

emergency department from urgent care.  Patient's 5-year-old female developed 

upper respiratory symptoms over the last 2-3 days.  This morning she woke of 

with trouble breathing and coughing.  Patient was seen and evaluated by 

physician assistant told to come to the emergency Department immediately.  

Parents report the patient is not having any medical history.  She was admitted 

as an infant for concerns of seizure however nothing came of it.  Patient has 

never been admitted to the hospital before.  No obvious sick contacts.





The ROS documented in this emergency department record has been reviewed and 

confirmed by me.  Those systems with pertinent positive or negative responses 

have been documented in the HPI.  All other systems are other negative and/or 

noncontributory.








PHYSICAL EXAM:


General Impression: Alert and oriented x3, not in acute distress


HEENT: Normocephalic atraumatic, extra-ocular movements intact, pupils equal and

reactive to light bilaterally, mucous membranes moist.


Cardiovascular: Heart regular rate and rhythm


Chest: Retractions, coughing, auscultatory wheezes


Abdomen: abdomen soft, non-tender, non-distended, no organomegaly


Musculoskeletal: Pulses present and equal in all extremities, no peripheral 

edema


Motor:  no focal deficits noted


Neurological: CN II-XII grossly intact, no focal motor or sensory deficits noted


Skin: Intact with no visualized rashes


Psych: Normal affect and mood





ED course: 5-year-old female presents to the emergency department for wheezing 

and respiratory distress.  Vital signs upon arrival shows temperature 1 of 2.2, 

heart rate 146, respiratory rate 56, 94% on room air.  


4 panel viral PCR is negative for covid, influenza and rsv.  CXR shows minimal 

perihilar infiltrate. repeat vitals improved. reevaluated at bedside at 945 pm 

found to be in stable medical condition. 





This point patient appears to be stable for discharge.  Parents are reliable and

return precautions explained.  They do have access to a nebulizer at home.  

Patient provided with prescription for albuterol for the nebulizer.  They're 

instructed patient with updrafts regularly for the next 48 hours.  Patient also 

given prescription for antibiotics. 








- Related Data


                                Home Medications











 Medication  Instructions  Recorded  Confirmed


 


Cholecalciferol (Vitamin D3) [Baby 1 ml PO DAILY 08/10/17 08/10/17





Ddrops]   








                                  Previous Rx's











 Medication  Instructions  Recorded


 


Albuterol Sulfate [Accuneb] 3 ml INHALATION Q6H #12 ml 09/16/22


 


Azithromycin [Zithromax] 125 mg PO DAILY 4 Days #25 ml 09/16/22











                                    Allergies











Allergy/AdvReac Type Severity Reaction Status Date / Time


 


No Known Allergies Allergy   Verified 08/10/17 19:43














Review of Systems


ROS Statement: 


Those systems with pertinent positive or pertinent negative responses have been 

documented in the HPI.





ROS Other: All systems not noted in ROS Statement are negative.





Past Medical History


Past Medical History: No Reported History


History of Any Multi-Drug Resistant Organisms: None Reported


Past Surgical History: No Surgical Hx Reported


Past Psychological History: No Psychological Hx Reported





General Exam


Limitations: no limitations





Course


                                   Vital Signs











  09/16/22 09/16/22 09/16/22





  19:35 19:46 19:59


 


Temperature 102.2 F H  


 


Pulse Rate 146 H 152 H 164 H


 


Respiratory 56 H  





Rate   


 


O2 Sat by Pulse 94 L  





Oximetry   














  09/16/22 09/16/22





  20:06 20:18


 


Temperature  


 


Pulse Rate 158 H 174 H


 


Respiratory  





Rate  


 


O2 Sat by Pulse  





Oximetry  














Medical Decision Making





- Lab Data


                                   Lab Results











  09/16/22 Range/Units





  19:53 


 


Influenza Type A (PCR)  Not Detected  (Not Detectd)  


 


Influenza Type B (PCR)  Not Detected  (Not Detectd)  


 


RSV (PCR)  Not Detected  (Not Detectd)  


 


SARS-CoV-2 (PCR)  Not Detected  (Not Detectd)  














Disposition


Clinical Impression: 


 Bronchospasm





Disposition: HOME SELF-CARE


Condition: Fair


Instructions (If sedation given, give patient instructions):  Bronchospasm (ED)


Prescriptions: 


Albuterol Sulfate [Accuneb] 3 ml INHALATION Q6H #12 ml


Azithromycin [Zithromax] 125 mg PO DAILY 4 Days #25 ml


Is patient prescribed a controlled substance at d/c from ED?: No


Referrals: 


Daya Camarillo MD [Primary Care Provider] - 1-2 days


Time of Disposition: 21:48 abnormal labs

## 2022-10-27 NOTE — PROGRESS NOTE ADULT - PROBLEM SELECTOR PLAN 1
Addended by: IGLESIA CORDERO on: 10/27/2022 09:06 AM     Modules accepted: Orders     Resolved.  presumed to be euvolemic hyponatremia 2/2 SIADH vs increased fluid intake w/o solute intake  - on fluid restriction + salt tabs  - Renal following, appreciate recs  - d/c ssri today  - will d/c with salt tab 1g qd

## 2022-11-01 NOTE — PROGRESS NOTE ADULT - SUBJECTIVE AND OBJECTIVE BOX
[FreeTextEntry1] : My impression is that the patient has a benign soft tissue mass at the dorsal–ulnar aspect of the left index finger.  I explained the low likelihood that this is a concerning cancerous lesion.  Treatment options were discussed, both close observation and monitoring versus surgical excision.  I explained the advantages and disadvantages of both.  I explained the inherent risks and potential complications of surgery.  I did recommend surgical intervention to excise the mass if he is having consistent and significant symptoms.  I did mention an MRI would be helpful prior to surgical intervention to better characterize the lesion.  The patient will discuss with his family the next steps in treatment and will contact my office to schedule surgery, if this is desired.  I otherwise recommended follow-up on an as-needed basis or sooner, if the patient notices significant enlargement of the mass or the development of more significant symptoms.  All were in accordance with the plan. THE PATIENT WAS SEEN AND EXAMINED BY ME WITH THE HOUSESTAFF AND STROKE TEAM DURING MORNING ROUNDS.   HPI:  89 year old Right handed [place] female with past medical history of DM2, HTN, HLD, Hypothyroidism, asthma presented with left arm weakness and difficulty walking. Patient stated she was in her usual state of health until  evening prior to admission when she tried to bend down to pick something up off the floor, was unable to stand up, and subsequently fell to her knees. Patient then required help to stand up and went to bed. On 7/23/2018 afternoon, around 1 pm, patient was noted by family to have her left arm handing limp while sitting down. Patient then attempted to stand but was unable to and required assistance. At that time, patient denied any accompanying changes in vision, changes in speech, numbness, or tingling, dizziness, or confusion.  At baseline, patient ambulates with a cane.  Patient states she takes Aspirin 81mg daily and Lovastatin but is unsure of her other medications.       SUBJECTIVE: Remains with some shoulder discomfort.     acetaminophen   Tablet. 650 milliGRAM(s) Oral every 6 hours PRN  amLODIPine   Tablet 10 milliGRAM(s) Oral daily  amoxicillin  500 milliGRAM(s)/clavulanate 1 Tablet(s) Oral two times a day  apixaban 2.5 milliGRAM(s) Oral every 12 hours  atorvastatin 80 milliGRAM(s) Oral at bedtime  dextrose 40% Gel 15 Gram(s) Oral once PRN  dextrose 5%. 1000 milliLiter(s) IV Continuous <Continuous>  dextrose 50% Injectable 12.5 Gram(s) IV Push once  dextrose 50% Injectable 25 Gram(s) IV Push once  dextrose 50% Injectable 25 Gram(s) IV Push once  glucagon  Injectable 1 milliGRAM(s) IntraMuscular once PRN  hydrALAZINE Injectable 10 milliGRAM(s) IV Push every 6 hours PRN  insulin lispro (HumaLOG) corrective regimen sliding scale   SubCutaneous three times a day before meals  lidocaine   Patch 1 Patch Transdermal daily      PHYSICAL EXAM:   Vital Signs Last 24 Hrs  T(C): 36.6 (28 Jul 2018 05:30), Max: 37 (28 Jul 2018 00:32)  T(F): 97.8 (28 Jul 2018 05:30), Max: 98.6 (28 Jul 2018 00:32)  HR: 76 (28 Jul 2018 05:30) (70 - 82)  BP: 162/75 (28 Jul 2018 05:30) (120/77 - 176/77)  BP(mean): --  RR: 18 (28 Jul 2018 05:30) (18 - 18)  SpO2: 98% (28 Jul 2018 05:30) (95% - 99%)      General: No acute distress  HEENT: VFF, EMOi  Abdomen: Soft, nontender, nondistended   Extremities: No edema    NEUROLOGICAL EXAM:  Mental status: Awake, alert to verbal stimuli, no neglect, oriented to person, hospital, month/year, follows commands, ID objects   Cranial Nerves: No facial asymmetry, no nystagmus, no dysarthria,  tongue midline  Motor exam: Normal tone, no drift, 5/5 RUE, 5/5 RLE, left hemiparesis: LUE 4/5 LLE 3/5  Sensation: Intact to light touch   Coordination/ Gait: No dysmetria   LABS:                        11.9   9.8   )-----------( 209      ( 27 Jul 2018 09:44 )             37.1    07-27    136  |  98  |  20  ----------------------------<  199<H>  3.7   |  25  |  1.37<H>    Ca    8.8      27 Jul 2018 09:44      Hemoglobin A1C, Whole Blood: 7.4 % (07-24 @ 08:23)  Hemoglobin A1C, Whole Blood: 9.1 % (05-16 @ 11:05)      IMAGING: Reviewed by me.     TTE ( 07.26.18)  1. Normal left ventricular internal dimensions and wall  thickness.  2. Normal left ventricular systolic function. No segmental  wall motion abnormalities.  3. Mild diastolic dysfunction (Stage I).  4. Normal right ventricular size and function.  5. Estimated pulmonary artery systolic pressure equals 44  mm Hg, assuming right atrial pressure equals 8 mm Hg,  consistent with mild pulmonary pressures.    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  Probable subacute infarct in the left corona radiata.  Neck MRA: Significantly limited study however no gross high-grade   stenosis is identified.  Brain MRA: Multifocal narrowing of the right anterior cerebral artery   with poor visualization of the distal segment.  Multifocal M1 segment narrowing likely on the basis of atherosclerotic   disease. THE PATIENT WAS SEEN AND EXAMINED BY ME WITH THE HOUSESTAFF AND STROKE TEAM DURING MORNING ROUNDS.   HPI:  89 year old Right handed Lebanese female with past medical history of DM2, HTN, HLD, Hypothyroidism, asthma presented with left arm weakness and difficulty walking. Patient stated she was in her usual state of health until  evening prior to admission when she tried to bend down to pick something up off the floor, was unable to stand up, and subsequently fell to her knees. Patient then required help to stand up and went to bed. On 7/23/2018 afternoon, around 1 pm, patient was noted by family to have her left arm handing limp while sitting down. Patient then attempted to stand but was unable to and required assistance. At that time, patient denied any accompanying changes in vision, changes in speech, numbness, or tingling, dizziness, or confusion.  At baseline, patient ambulates with a cane.  Patient states she takes Aspirin 81mg daily and Lovastatin but is unsure of her other medications.       SUBJECTIVE: Remains with some shoulder discomfort.     acetaminophen   Tablet. 650 milliGRAM(s) Oral every 6 hours PRN  amLODIPine   Tablet 10 milliGRAM(s) Oral daily  amoxicillin  500 milliGRAM(s)/clavulanate 1 Tablet(s) Oral two times a day  apixaban 2.5 milliGRAM(s) Oral every 12 hours  atorvastatin 80 milliGRAM(s) Oral at bedtime  dextrose 40% Gel 15 Gram(s) Oral once PRN  dextrose 5%. 1000 milliLiter(s) IV Continuous <Continuous>  dextrose 50% Injectable 12.5 Gram(s) IV Push once  dextrose 50% Injectable 25 Gram(s) IV Push once  dextrose 50% Injectable 25 Gram(s) IV Push once  glucagon  Injectable 1 milliGRAM(s) IntraMuscular once PRN  hydrALAZINE Injectable 10 milliGRAM(s) IV Push every 6 hours PRN  insulin lispro (HumaLOG) corrective regimen sliding scale   SubCutaneous three times a day before meals  lidocaine   Patch 1 Patch Transdermal daily      PHYSICAL EXAM:   Vital Signs Last 24 Hrs  T(C): 36.6 (28 Jul 2018 05:30), Max: 37 (28 Jul 2018 00:32)  T(F): 97.8 (28 Jul 2018 05:30), Max: 98.6 (28 Jul 2018 00:32)  HR: 76 (28 Jul 2018 05:30) (70 - 82)  BP: 162/75 (28 Jul 2018 05:30) (120/77 - 176/77)  BP(mean): --  RR: 18 (28 Jul 2018 05:30) (18 - 18)  SpO2: 98% (28 Jul 2018 05:30) (95% - 99%)      General: No acute distress  HEENT: VFF, EMOi  Abdomen: Soft, nontender, nondistended   Extremities: No edema    NEUROLOGICAL EXAM:  Mental status: Awake, alert to verbal stimuli, no neglect, oriented to person, hospital, month/year, follows commands, ID objects   Cranial Nerves: No facial asymmetry, no nystagmus, no dysarthria,  tongue midline  Motor exam: Normal tone, 5/5 RUE, 5/5 RLE, left hemiparesis: LUE 4+/5 LLE 3+/5 - slight drift   Sensation: Intact to light touch   Coordination/ Gait: No dysmetria   LABS:                        11.9   9.8   )-----------( 209      ( 27 Jul 2018 09:44 )             37.1    07-27    136  |  98  |  20  ----------------------------<  199<H>  3.7   |  25  |  1.37<H>    Ca    8.8      27 Jul 2018 09:44      Hemoglobin A1C, Whole Blood: 7.4 % (07-24 @ 08:23)  Hemoglobin A1C, Whole Blood: 9.1 % (05-16 @ 11:05)      IMAGING: Reviewed by me.     TTE ( 07.26.18)  1. Normal left ventricular internal dimensions and wall  thickness.  2. Normal left ventricular systolic function. No segmental  wall motion abnormalities.  3. Mild diastolic dysfunction (Stage I).  4. Normal right ventricular size and function.  5. Estimated pulmonary artery systolic pressure equals 44  mm Hg, assuming right atrial pressure equals 8 mm Hg,  consistent with mild pulmonary pressures.    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  Probable subacute infarct in the left corona radiata.  Neck MRA: Significantly limited study however no gross high-grade   stenosis is identified.  Brain MRA: Multifocal narrowing of the right anterior cerebral artery   with poor visualization of the distal segment.  Multifocal M1 segment narrowing likely on the basis of atherosclerotic   disease.

## 2023-03-01 NOTE — H&P ADULT - NSCORESITESY/N_GEN_A_CORE_RD
[Postpartum Follow Up] : postpartum follow up [Delivery Date: ___] : on [unfilled] [Primary C/S] : delivered by  section No [Breastfeeding] : currently nursing [None] : No associated symptoms are reported [Clean/Dry/Intact] : clean, dry and intact [Erythema] : not erythematous [Swelling] : not swollen [Dehiscence] : not dehisced [Doing Well] : is doing well [No Sign of Infection] : is showing no signs of infection [Excellent Pain Control] : has excellent pain control [No [place]] : to avoid sexual intercourse [No Tampons/Suppositories] : against using tampons or vaginal suppositories [No Sports] : not to participate in sports [FreeTextEntry8] : pt here for 2wks incision check. doing well. ambulating, voiding, tolerating diet.  [de-identified] : JUAN, savio [de-identified] : incision care discussed. RTC in 4wks for postpartum visit

## 2023-03-16 NOTE — PHYSICAL THERAPY INITIAL EVALUATION ADULT - GENERAL OBSERVATIONS, REHAB EVAL
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 f/u and treatment  - recheck lipids  - continue present medicines.  RTC in 6 months for MD f/u    Mateusz Head MD [facility]  Advanced Heart Failure and Transplant Cardiology  Clinical  - [facility] Cardiology/Internal Medicine   Director - Cardio-Oncology - [facility]

## 2023-07-26 NOTE — H&P ADULT - PROBLEM/PLAN-10
[de-identified] : Healing medial malleolus fracture mortise reduced has been weightbearing as tolerated she will continue her therapy home exercise program.  She may follow-up as needed.  All questions were answered.
DISPLAY PLAN FREE TEXT

## 2023-12-04 NOTE — ED ADULT NURSE REASSESSMENT NOTE - FACIAL SYMMETRY
"-- DO NOT REPLY / DO NOT REPLY ALL --  -- Message is from [facility] ([facility]) --    General Patient Message:  Patient calling stating that went to 2 urgent care facility and they were full couldn't been seen on either none of them and no appointments available with any trusted partner as well for her cough can't get rid off wanted to know dr can accommodate her . Caller Information         Type Contact Phone/Fax    12/04/2023 11:56 AM CST Phone (Incoming) Susana Rizvi (Self) [phone] (M)          Alternative phone number:     Can a detailed message be left? Yes    Message Turnaround:     Is it Working Hours? Yes - Working Hours     [place]:    Please give this turnaround time to the caller: ""This message will be sent to [facility] Provider's name]. The clinical team will fulfill your request as soon as they review your message. \""                "
Called and notified patient of the medication sent to her pharmacy    azithromycin (Zithromax Z-Juanjose) 250 -2 tabs 1st day then 1 a day for rest of the 4 days     benzonatate (TESSALON PERLES) - Take 1 capsule by mouth 3 times daily as needed for Cough. - can cause drowsiness.  Fall precautions advised    Patient verbalized understanding
left facial droop

## 2024-01-01 NOTE — PROGRESS NOTE ADULT - PROBLEM SELECTOR PROBLEM 7
Type 2 diabetes mellitus without complication, with long-term current use of insulin
EOAE (evoked otoacoustic emission)

## 2024-05-29 NOTE — ED ADULT NURSE NOTE - NS ED NURSE PRESS ULCER STAGE 11
Refill Request    Medication request: HYDROcodone-acetaminophen  MG Oral Tab  Take 1 tablet by mouth every 6 (six) hours as needed for Pain.     LOV:23- video visit  Due back to clinic per last office note:  \"Follow-up:  6 months \"  NOV: 2024 [facility] - 30 [facility]    Urine drug screen (if applicable): 23  Pain contract:  on 24    LOV plan (if weaning or changing medications): Per  at LOV: \"She is taking 4 of the Norco per day. \" \"The patient will continue with their current pain medications. \"             
stage I

## 2024-09-09 NOTE — PATIENT PROFILE ADULT - NSPROSPHOSPCHAPLAINYN_GEN_A_NUR
[facility]         HOSPITALIST  DISCHARGE SUMMARY    Patient Name: Nelia Fox  : 1957  MRN: [id number]    Date of Admission: 2024  Date of Discharge:  2024  Primary Care Physician: Kristy Cardona APRN    Consults       Date and Time Order Name Status Description    2024  5:50 PM Inpatient Nephrology Consult Completed     2024  4:33 PM Surgery (on-call MD unless specified)      2024  4:33 PM Inpatient Hospitalist Consult      2024  4:20 PM Inpatient Hospitalist Consult              Active and Resolved Hospital Problems:  Small bowel obstruction secondary to strangulated small bowel in incisional hernia  Strangulated small bowel due to incisional hernia incarceration status post small bowel resection  Incisional hernia x 2 status post repair 2024  Mild hyperkalemia  End-stage renal disease on hemodialysis  Diabetes mellitus  Anemia of chronic kidney disease  COPD with chronic hypoxic respiratory failure on 2 L nasal cannula continuously  Paroxysmal SVT  History of hypertension  History of stroke  Obesity BMI 39    Hospital Course     Hospital Course:  Nelia Fox is a 67 y.o. female history of COPD with chronic hypoxic respiratory failure on 2 L nasal cannula continuously, diabetes mellitus, paroxysmal SVT, stroke, end-stage renal disease on hemodialysis  with Jill Egan and Mirtha, presents with abdominal pain, nausea and vomiting.  Vomitus is nonbloody nonbilious.  Symptoms started 1 day prior.  Patient has had episodes of small bowel structures in the past and imaging on date of presentation shows an another small bowel obstruction at the site of her incisional hernia of the anterior abdomen.  Patient admitted for further evaluation and treatment.  Nephrology and general surgery consulted.  On 2024 patient taken for exploratory laparotomy with small bowel resection secondary to strangulated incisional  hernia with repair of incisional hernia x 2.  Patient tolerated the procedure well.  Awaiting bowel function to return prior to removal of NG.  Patient reports being mostly wheelchair-bound at baseline, she is able to walk some with a walker participate in transfers.  Patient feels as though she is performing at her baseline and is requesting discharge home.  Patient is discharged home today in stable condition.  Patient should follow-up with dialysis as scheduled for Tuesday Thursday Saturday.  Patient should follow-up with general surgery 2 weeks following discharge.       Day of Discharge     Vital Signs:  Temp:  [98.1 °F (36.7 °C)-98.8 °F (37.1 °C)] 98.8 °F (37.1 °C)  Heart Rate:  [67-77] 67  Resp:  [14-18] 16  BP: (101-173)/() 133/45    Physical Exam:   GEN: No acute distress  HEENT: Moist mucous membranes  LUNGS: Equal chest rise bilaterally  CARDIAC: Regular rate and rhythm  NEURO: Moving all 4 extremities spontaneously  SKIN: No obvious breakdown    Discharge Details        Discharge Medications        Changes to Medications        Instructions Start Date   colestipol 1 g tablet  Commonly known as: COLESTID  What changed: when to take this   1 g, Oral, Daily PRN      losartan 100 MG tablet  Commonly known as: COZAAR  What changed: how much to take   50 mg, Oral, Every 24 Hours Scheduled             Continue These Medications        Instructions Start Date   apixaban 5 MG tablet tablet  Commonly known as: ELIQUIS   5 mg, Oral, 2 Times Daily      aspirin 81 MG chewable tablet   81 mg, Oral, Daily      atorvastatin 40 MG tablet  Commonly known as: LIPITOR   40 mg, Oral, Daily      budesonide-formoterol 160-4.5 MCG/ACT inhaler  Commonly known as: SYMBICORT   2 puffs, Inhalation, 2 Times Daily - RT      Cyanocobalamin 1000 MCG/ML kit   1,000 mcg, Intramuscular, Every 14 Days      magnesium oxide 400 MG tablet  Commonly known as: MAG-OX   400 mg, Oral, Daily      metoprolol succinate XL 50 MG 24 hr  tablet  Commonly known as: TOPROL-XL   50 mg, Oral, Daily      saccharomyces boulardii 250 MG capsule  Commonly known as: FLORASTOR   250 mg, Oral, 2 Times Daily      vitamin D 1.25 MG (30021 UT) capsule capsule  Commonly known as: ERGOCALCIFEROL   50,000 Units, Oral, Weekly             Stop These Medications      amLODIPine 10 MG tablet  Commonly known as: NORVASC              Allergies   Allergen Reactions   • Keflex [Cephalexin] Diarrhea       Discharge Disposition:  Home or Self Care    Diet:  Hospital:  Diet Order   Procedures   • Diet: Renal; Low Potassium, Low Sodium (2-3g); Fluid Consistency: Thin (IDDSI 0)       Discharge Activity:       CODE STATUS:  Code Status and Medical Interventions: CPR (Attempt to Resuscitate); Full   Ordered at: 09/01/24 2227     Code Status (Patient has no pulse and is not breathing):    CPR (Attempt to Resuscitate)     Medical Interventions (Patient has pulse or is breathing):    Full       Future Appointments   Date Time Provider Department Center   9/10/2024  8:00 AM DIALYSIS ROOM 281 [facility] DIAL [facility]       Additional Instructions for the Follow-ups that You Need to Schedule       Discharge Follow-up with PCP   As directed       Currently Documented PCP:    Kristy Cardona APRN    PCP Phone Number:    [phone]     Follow Up Details: 3 to 7 days                Pertinent  and/or Most Recent Results     IMAGING:  Cardiac Catheterization/Vascular Study    Result Date: 9/5/2024  1.  Moderate stenosis of the proximal aspect of the fistula was successfully treated with 7 mm angioplasty, with good angiographic result.  The fistula is ready for use.     XR Abdomen KUB    Result Date: 9/1/2024  XR ABDOMEN KUB-  Date of Exam: 9/1/2024 11:52 PM  Indication: verify ng placement; K56.609-Unspecified intestinal obstruction, unspecified as to partial versus complete obstruction  Comparison: 9/1/2024 at 2240 hours  Findings: NG tube has been repositioned with the tip now in the stomach.  Gas-filled small bowel loops are present in the upper abdomen.      NG tube tip is now within the body of the stomach. The sideport is just past the level of the GE junction.   Electronically Signed By-Dr. Dayron Saini MD On:9/1/2024 11:59 PM      XR Abdomen KUB    Result Date: 9/1/2024  XR ABDOMEN KUB Date of Exam: 9/1/2024 10:45 PM EDT Indication: NG tube placement verification Comparison: Earlier same day Findings: NG tube is once again looped above the level of the jewel, probably within the esophagus. It does not extend into the stomach.     NG tube is looped in the upper thoracic esophagus. The tip is not in the stomach. Electronically Signed: Dayron Saini MD  9/1/2024 10:53 PM EDT  Workstation ID: [id number]    XR Abdomen KUB    Result Date: 9/1/2024  XR ABDOMEN KUB Date of Exam: 9/1/2024 5:16 PM EDT Indication: NG PLACEMENT Comparison: CT abdomen pelvis 9/1/2024 Findings: The distal enteric tube is looped with the tip directed cranially in the mid chest. Cardiac silhouette appears enlarged. Lungs are grossly clear.     Impression: Malpositioned enteric tube looped in the lower thoracic esophagus with the tip directed cranially over the mid chest. I called this result to the ER nurse at 5:25 p.m. on 9/1/2024, who stated that he would remove it and replace it. Electronically Signed: Gricelda Win  9/1/2024 5:26 PM EDT  Workstation ID: [id number]    CT Abdomen Pelvis Without Contrast    Result Date: 9/1/2024  CT ABDOMEN PELVIS WO CONTRAST Date of Exam: 9/1/2024 2:51 PM EDT Indication: Flank pain, kidney stone suspected Abdominal pain flank pain. Comparison: 6/8/2024 Technique: Axial CT images were obtained of the abdomen and pelvis without the administration of contrast. Reconstructed coronal and sagittal images were also obtained. Automated exposure control and iterative construction methods were used. Findings: Visualized lung bases are clear. Liver, pancreas, and spleen are within normal limits. Patient  is status post cholecystectomy. There is a 3.2 cm cyst arising from the posterior cortex of the mid right kidney. There is no right-sided renal or ureteral stone or hydronephrosis. Patient is status post left nephrectomy. There are bilateral adrenal nodules which are unchanged and most consistent with adenomas. There is no abdominal or retroperitoneal adenopathy. There is some borderline distended loops of fluid-filled small bowel throughout the abdomen. There is a periumbilical hernia containing a short segment of small bowel. The small bowel distal to this hernia is of normal caliber. Findings would be compatible with partial small bowel obstruction related to the hernia. There is fat stranding and fluid within the hernia sac which is new from the prior exam. Below the level of the umbilicus to the left of midline there is an additional ventral hernia containing a loop of nondilated small bowel. No associated inflammatory change. Pelvis: The urinary bladder appears to be completely empty. Images through the pelvis are severely limited due to beam hardening artifact from bilateral total hip arthroplasties. There are scattered colonic diverticula. Patient is status post right hemicolectomy. No pelvic or inguinal adenopathy. No free intraperitoneal fluid. No lytic or sclerotic bone lesions are seen. There are degenerative changes throughout the lumbar spine.    Impression: 1 fluid-filled mildly distended loops of small bowel within the upper abdomen related to partial small bowel obstruction from umbilical hernia as detailed above. There is fat stranding and fluid within the hernia sac compatible with inflammatory change in incarceration. 2. Ventral hernia below the level of the umbilicus to the left of midline containing a short segment of small bowel but without evidence of inflammatory change or bowel obstruction. 3. Status post left nephrectomy. Electronically Signed: Jerman Washington MD  9/1/2024 3:21 PM EDT   Workstation ID: [id number]      LAB RESULTS:      Lab 09/09/24 0322 09/08/24 0455 09/07/24  0415 09/06/24  0438 09/05/24  0545   WBC 8.16 8.05 7.26 8.29 8.93   HEMOGLOBIN 8.3* 8.8* 8.9* 8.0* 9.4*   HEMATOCRIT 25.3* 26.3* 27.2* 24.7* 30.3*   PLATELETS 123* 126* 114* 113* 127*   NEUTROS ABS 5.97 6.23 5.12 6.95 6.50   IMMATURE GRANS (ABS) 0.07* 0.09* 0.10* 0.07* 0.07*   LYMPHS ABS 1.09 0.76 0.99 0.49* 1.25   MONOS ABS 0.54 0.53 0.63 0.51 0.77   EOS ABS 0.44* 0.39 0.37 0.25 0.30   MCV 95.5 95.3 96.8 97.2* 102.4*         Lab 09/09/24 0322 09/08/24 0455 09/07/24 0415 09/06/24  0438 09/05/24  0445 09/04/24  0245   SODIUM 137 140 135* 137 134* 136   POTASSIUM 3.8 3.8 3.8 4.1 4.9 4.8   CHLORIDE 104 107 104 105 98 99   CO2 21.1* 20.9* 22.5 22.5 22.0 22.4   ANION GAP 11.9 12.1 8.5 9.5 14.0 14.6   BUN 32* 26* 19 24* 58* 51*   CREATININE 7.06* 5.89* 4.22* 4.13* 8.19* 6.94*   EGFR 5.9* 7.4* 11.0* 11.3* 5.0* 6.1*   GLUCOSE 107* 104* 109* 97 125* 129*   CALCIUM 8.3* 8.7 8.9 8.8 8.5* 8.4*   MAGNESIUM 1.5* 1.6 1.6 1.6 2.0 1.8   PHOSPHORUS 4.8* 4.6* 4.0 3.8  --  5.5*         Lab 09/09/24 0322 09/08/24 0455 09/07/24  0415 09/06/24  0438 09/04/24  0245   TOTAL PROTEIN 6.3 6.3 6.4 6.2 6.1   ALBUMIN 2.9* 2.9* 2.9* 2.9* 2.8*   GLOBULIN 3.4 3.4 3.5 3.3 3.3   ALT (SGPT) 7 6 6 7 9   AST (SGOT) 7 7 8 8 8   BILIRUBIN 0.4 0.4 0.4 0.5 0.6   ALK PHOS 114 108 112 113 116                 Lab 09/04/24  0901   ABO TYPING A   RH TYPING Positive   ANTIBODY SCREEN Negative         Brief Urine Lab Results  (Last result in the past 365 days)        Color   Clarity   Blood   Leuk Est   Nitrite   Protein   CREAT   Urine HCG        05/07/24 0155 Yellow   Turbid   Moderate (2+)   Large (3+)   Negative   >=300 mg/dL (3+)                 Microbiology Results (last 10 days)       ** No results found for the last 240 hours. **          Results for orders placed in visit on 10/19/22    Duplex Hemodialysis Access CAR    Interpretation Summary  •  Significantly  elevated velocities in the proximal few centimeters of the fistula consistent with significant stenosis.  •  Clinical and/or fistulogram correlation is advised regarding these findings.    Results for orders placed in visit on 10/19/22    Duplex Hemodialysis Access CAR    Interpretation Summary  •  Significantly elevated velocities in the proximal few centimeters of the fistula consistent with significant stenosis.  •  Clinical and/or fistulogram correlation is advised regarding these findings.    Results for orders placed during the hospital encounter of 09/11/22    Adult Transthoracic Echo Complete W/ Cont if Necessary Per Protocol    Interpretation Summary  Fibrocalcific mitral and aortic valves.  Normal left ventricular systolic function.  Trace aortic regurgitation.  Trace MR and trace TR.      Time spent on Discharge including face to face service: Greater than 30 minutes      Electronically signed by Stephen Puckett MD, 09/09/24, 2:22 PM EDT.       no

## 2024-11-29 NOTE — PROGRESS NOTE ADULT - PROVIDER SPECIALTY LIST ADULT
Cardiology
Neurology
equal bilaterally
Cardiology

## 2025-02-12 NOTE — PATIENT PROFILE ADULT - NSASFALLATTEMPTOOB_GEN_A_NUR
Refill Decision Note  Quick DC. Request already responded to by other means (e.g. phone or fax)    Uyen Gilmore  is requesting a refill authorization.  Brief Assessment and Rationale for Refill:  Quick Discontinue     Medication Therapy Plan:       Medication Reconciliation Completed: No   Comments:           Note composed:4:45 PM 02/12/2025            
no

## 2025-04-13 NOTE — H&P ADULT - I WAS PHYSICALLY PRESENT FOR THE KEY PORTIONS OF THE EVALUATION AND MANAGEMENT (E/M) SERVICE PROVIDED.  I AGREE WITH THE ABOVE HISTORY, PHYSICAL, AND PLAN WHICH I HAVE REVIEWED AND EDITED WHERE APPROPRIATE
Problem: Adult Inpatient Plan of Care  Goal: Absence of Hospital-Acquired Illness or Injury  Outcome: Progressing     Problem: Infection  Goal: Absence of Infection Signs and Symptoms  Outcome: Progressing     Problem: Acute Kidney Injury/Impairment  Goal: Fluid and Electrolyte Balance  Outcome: Progressing     Problem: Fall Injury Risk  Goal: Absence of Fall and Fall-Related Injury  Outcome: Progressing      Statement Selected

## 2025-05-13 NOTE — PATIENT PROFILE ADULT - NSPROEDAREADYLEARNOTH_GEN_A_NUR
How Severe Are Your Spot(S)?: mild
Have Your Spot(S) Been Treated In The Past?: has not been treated
Hpi Title: Evaluation of Skin Lesions
acuteness of illness